# Patient Record
Sex: FEMALE | Race: ASIAN | NOT HISPANIC OR LATINO | URBAN - METROPOLITAN AREA
[De-identification: names, ages, dates, MRNs, and addresses within clinical notes are randomized per-mention and may not be internally consistent; named-entity substitution may affect disease eponyms.]

---

## 2023-01-01 ENCOUNTER — INPATIENT (INPATIENT)
Facility: HOSPITAL | Age: 83
LOS: 14 days | DRG: 423 | End: 2023-05-10
Attending: HOSPITALIST | Admitting: INTERNAL MEDICINE
Payer: MEDICARE

## 2023-01-01 VITALS — HEART RATE: 125 BPM | OXYGEN SATURATION: 82 %

## 2023-01-01 VITALS
DIASTOLIC BLOOD PRESSURE: 61 MMHG | WEIGHT: 98.99 LBS | SYSTOLIC BLOOD PRESSURE: 104 MMHG | HEIGHT: 60 IN | RESPIRATION RATE: 18 BRPM | OXYGEN SATURATION: 94 % | TEMPERATURE: 99 F | HEART RATE: 134 BPM

## 2023-01-01 DIAGNOSIS — K86.89 OTHER SPECIFIED DISEASES OF PANCREAS: ICD-10-CM

## 2023-01-01 DIAGNOSIS — Z71.89 OTHER SPECIFIED COUNSELING: ICD-10-CM

## 2023-01-01 DIAGNOSIS — K22.6 GASTRO-ESOPHAGEAL LACERATION-HEMORRHAGE SYNDROME: ICD-10-CM

## 2023-01-01 DIAGNOSIS — C78.00 SECONDARY MALIGNANT NEOPLASM OF UNSPECIFIED LUNG: ICD-10-CM

## 2023-01-01 DIAGNOSIS — D64.9 ANEMIA, UNSPECIFIED: ICD-10-CM

## 2023-01-01 DIAGNOSIS — K83.09 OTHER CHOLANGITIS: ICD-10-CM

## 2023-01-01 DIAGNOSIS — Z29.9 ENCOUNTER FOR PROPHYLACTIC MEASURES, UNSPECIFIED: ICD-10-CM

## 2023-01-01 DIAGNOSIS — K83.1 OBSTRUCTION OF BILE DUCT: ICD-10-CM

## 2023-01-01 DIAGNOSIS — E87.1 HYPO-OSMOLALITY AND HYPONATREMIA: ICD-10-CM

## 2023-01-01 DIAGNOSIS — R62.7 ADULT FAILURE TO THRIVE: ICD-10-CM

## 2023-01-01 DIAGNOSIS — R74.01 ELEVATION OF LEVELS OF LIVER TRANSAMINASE LEVELS: ICD-10-CM

## 2023-01-01 DIAGNOSIS — R65.10 SYSTEMIC INFLAMMATORY RESPONSE SYNDROME (SIRS) OF NON-INFECTIOUS ORIGIN WITHOUT ACUTE ORGAN DYSFUNCTION: ICD-10-CM

## 2023-01-01 DIAGNOSIS — C25.9 MALIGNANT NEOPLASM OF PANCREAS, UNSPECIFIED: ICD-10-CM

## 2023-01-01 DIAGNOSIS — K26.9 DUODENAL ULCER, UNSPECIFIED AS ACUTE OR CHRONIC, WITHOUT HEMORRHAGE OR PERFORATION: ICD-10-CM

## 2023-01-01 DIAGNOSIS — R11.2 NAUSEA WITH VOMITING, UNSPECIFIED: ICD-10-CM

## 2023-01-01 DIAGNOSIS — J96.01 ACUTE RESPIRATORY FAILURE WITH HYPOXIA: ICD-10-CM

## 2023-01-01 DIAGNOSIS — E22.2 SYNDROME OF INAPPROPRIATE SECRETION OF ANTIDIURETIC HORMONE: ICD-10-CM

## 2023-01-01 DIAGNOSIS — Z86.39 PERSONAL HISTORY OF OTHER ENDOCRINE, NUTRITIONAL AND METABOLIC DISEASE: ICD-10-CM

## 2023-01-01 DIAGNOSIS — R91.1 SOLITARY PULMONARY NODULE: ICD-10-CM

## 2023-01-01 DIAGNOSIS — Z51.5 ENCOUNTER FOR PALLIATIVE CARE: ICD-10-CM

## 2023-01-01 DIAGNOSIS — C18.9 MALIGNANT NEOPLASM OF COLON, UNSPECIFIED: ICD-10-CM

## 2023-01-01 DIAGNOSIS — F03.90 UNSPECIFIED DEMENTIA WITHOUT BEHAVIORAL DISTURBANCE: ICD-10-CM

## 2023-01-01 LAB
ACANTHOCYTES BLD QL SMEAR: SIGNIFICANT CHANGE UP
ALBUMIN SERPL ELPH-MCNC: 1.9 G/DL — LOW (ref 3.3–5)
ALBUMIN SERPL ELPH-MCNC: 2 G/DL — LOW (ref 3.3–5)
ALBUMIN SERPL ELPH-MCNC: 2.1 G/DL — LOW (ref 3.3–5)
ALBUMIN SERPL ELPH-MCNC: 2.1 G/DL — LOW (ref 3.3–5)
ALBUMIN SERPL ELPH-MCNC: 2.2 G/DL — LOW (ref 3.3–5)
ALBUMIN SERPL ELPH-MCNC: 2.3 G/DL — LOW (ref 3.3–5)
ALBUMIN SERPL ELPH-MCNC: 2.4 G/DL — LOW (ref 3.3–5)
ALBUMIN SERPL ELPH-MCNC: 2.4 G/DL — LOW (ref 3.3–5)
ALBUMIN SERPL ELPH-MCNC: 2.5 G/DL — LOW (ref 3.3–5)
ALBUMIN SERPL ELPH-MCNC: 2.5 G/DL — LOW (ref 3.3–5)
ALBUMIN SERPL ELPH-MCNC: 2.6 G/DL — LOW (ref 3.3–5)
ALBUMIN SERPL ELPH-MCNC: 2.6 G/DL — LOW (ref 3.3–5)
ALBUMIN SERPL ELPH-MCNC: 2.7 G/DL — LOW (ref 3.3–5)
ALBUMIN SERPL ELPH-MCNC: 2.8 G/DL — LOW (ref 3.3–5)
ALP SERPL-CCNC: 315 U/L — HIGH (ref 40–120)
ALP SERPL-CCNC: 343 U/L — HIGH (ref 40–120)
ALP SERPL-CCNC: 344 U/L — HIGH (ref 40–120)
ALP SERPL-CCNC: 378 U/L — HIGH (ref 40–120)
ALP SERPL-CCNC: 385 U/L — HIGH (ref 40–120)
ALP SERPL-CCNC: 406 U/L — HIGH (ref 40–120)
ALP SERPL-CCNC: 431 U/L — HIGH (ref 40–120)
ALP SERPL-CCNC: 448 U/L — HIGH (ref 40–120)
ALP SERPL-CCNC: 448 U/L — HIGH (ref 40–120)
ALP SERPL-CCNC: 501 U/L — HIGH (ref 40–120)
ALP SERPL-CCNC: 504 U/L — HIGH (ref 40–120)
ALP SERPL-CCNC: 516 U/L — HIGH (ref 40–120)
ALP SERPL-CCNC: 532 U/L — HIGH (ref 40–120)
ALP SERPL-CCNC: 545 U/L — HIGH (ref 40–120)
ALP SERPL-CCNC: 558 U/L — HIGH (ref 40–120)
ALP SERPL-CCNC: 564 U/L — HIGH (ref 40–120)
ALP SERPL-CCNC: 612 U/L — HIGH (ref 40–120)
ALP SERPL-CCNC: 625 U/L — HIGH (ref 40–120)
ALP SERPL-CCNC: 669 U/L — HIGH (ref 40–120)
ALT FLD-CCNC: 101 U/L — HIGH (ref 10–45)
ALT FLD-CCNC: 102 U/L — HIGH (ref 10–45)
ALT FLD-CCNC: 103 U/L — HIGH (ref 10–45)
ALT FLD-CCNC: 110 U/L — HIGH (ref 10–45)
ALT FLD-CCNC: 112 U/L — HIGH (ref 10–45)
ALT FLD-CCNC: 58 U/L — HIGH (ref 10–45)
ALT FLD-CCNC: 58 U/L — HIGH (ref 10–45)
ALT FLD-CCNC: 63 U/L — HIGH (ref 10–45)
ALT FLD-CCNC: 63 U/L — HIGH (ref 10–45)
ALT FLD-CCNC: 69 U/L — HIGH (ref 10–45)
ALT FLD-CCNC: 72 U/L — HIGH (ref 10–45)
ALT FLD-CCNC: 79 U/L — HIGH (ref 10–45)
ALT FLD-CCNC: 82 U/L — HIGH (ref 10–45)
ALT FLD-CCNC: 87 U/L — HIGH (ref 10–45)
ALT FLD-CCNC: 90 U/L — HIGH (ref 10–45)
ALT FLD-CCNC: 91 U/L — HIGH (ref 10–45)
ALT FLD-CCNC: 95 U/L — HIGH (ref 10–45)
ALT FLD-CCNC: 96 U/L — HIGH (ref 10–45)
ALT FLD-CCNC: 98 U/L — HIGH (ref 10–45)
AMMONIA BLD-MCNC: 16 UMOL/L — SIGNIFICANT CHANGE UP (ref 11–55)
ANION GAP SERPL CALC-SCNC: 10 MMOL/L — SIGNIFICANT CHANGE UP (ref 5–17)
ANION GAP SERPL CALC-SCNC: 11 MMOL/L — SIGNIFICANT CHANGE UP (ref 5–17)
ANION GAP SERPL CALC-SCNC: 6 MMOL/L — SIGNIFICANT CHANGE UP (ref 5–17)
ANION GAP SERPL CALC-SCNC: 7 MMOL/L — SIGNIFICANT CHANGE UP (ref 5–17)
ANION GAP SERPL CALC-SCNC: 8 MMOL/L — SIGNIFICANT CHANGE UP (ref 5–17)
ANION GAP SERPL CALC-SCNC: 9 MMOL/L — SIGNIFICANT CHANGE UP (ref 5–17)
ANISOCYTOSIS BLD QL: SIGNIFICANT CHANGE UP
ANISOCYTOSIS BLD QL: SLIGHT — SIGNIFICANT CHANGE UP
APPEARANCE UR: ABNORMAL
APPEARANCE UR: CLEAR — SIGNIFICANT CHANGE UP
APPEARANCE UR: CLEAR — SIGNIFICANT CHANGE UP
APTT BLD: 26.2 SEC — LOW (ref 27.5–35.5)
APTT BLD: 26.4 SEC — LOW (ref 27.5–35.5)
APTT BLD: 26.5 SEC — LOW (ref 27.5–35.5)
APTT BLD: 27.9 SEC — SIGNIFICANT CHANGE UP (ref 27.5–35.5)
APTT BLD: 30.2 SEC — SIGNIFICANT CHANGE UP (ref 27.5–35.5)
APTT BLD: 33.7 SEC — SIGNIFICANT CHANGE UP (ref 27.5–35.5)
AST SERPL-CCNC: 118 U/L — HIGH (ref 10–40)
AST SERPL-CCNC: 120 U/L — HIGH (ref 10–40)
AST SERPL-CCNC: 123 U/L — HIGH (ref 10–40)
AST SERPL-CCNC: 124 U/L — HIGH (ref 10–40)
AST SERPL-CCNC: 133 U/L — HIGH (ref 10–40)
AST SERPL-CCNC: 135 U/L — HIGH (ref 10–40)
AST SERPL-CCNC: 138 U/L — HIGH (ref 10–40)
AST SERPL-CCNC: 138 U/L — HIGH (ref 10–40)
AST SERPL-CCNC: 140 U/L — HIGH (ref 10–40)
AST SERPL-CCNC: 141 U/L — HIGH (ref 10–40)
AST SERPL-CCNC: 141 U/L — HIGH (ref 10–40)
AST SERPL-CCNC: 142 U/L — HIGH (ref 10–40)
AST SERPL-CCNC: 147 U/L — HIGH (ref 10–40)
AST SERPL-CCNC: 154 U/L — HIGH (ref 10–40)
AST SERPL-CCNC: 156 U/L — HIGH (ref 10–40)
AST SERPL-CCNC: 159 U/L — HIGH (ref 10–40)
AST SERPL-CCNC: 162 U/L — HIGH (ref 10–40)
BACTERIA # UR AUTO: ABNORMAL /HPF
BACTERIA # UR AUTO: PRESENT /HPF
BACTERIA # UR AUTO: PRESENT /HPF
BASE EXCESS BLDA CALC-SCNC: 0.7 MMOL/L — SIGNIFICANT CHANGE UP (ref -2–3)
BASE EXCESS BLDA CALC-SCNC: 0.7 MMOL/L — SIGNIFICANT CHANGE UP (ref -2–3)
BASE EXCESS BLDA CALC-SCNC: 1.7 MMOL/L — SIGNIFICANT CHANGE UP (ref -2–3)
BASE EXCESS BLDV CALC-SCNC: -3.4 MMOL/L — LOW (ref -2–3)
BASO STIPL BLD QL SMEAR: PRESENT — SIGNIFICANT CHANGE UP
BASOPHILS # BLD AUTO: 0 K/UL — SIGNIFICANT CHANGE UP (ref 0–0.2)
BASOPHILS # BLD AUTO: 0.04 K/UL — SIGNIFICANT CHANGE UP (ref 0–0.2)
BASOPHILS # BLD AUTO: 0.05 K/UL — SIGNIFICANT CHANGE UP (ref 0–0.2)
BASOPHILS # BLD AUTO: 0.06 K/UL — SIGNIFICANT CHANGE UP (ref 0–0.2)
BASOPHILS # BLD AUTO: 0.07 K/UL — SIGNIFICANT CHANGE UP (ref 0–0.2)
BASOPHILS # BLD AUTO: 0.08 K/UL — SIGNIFICANT CHANGE UP (ref 0–0.2)
BASOPHILS # BLD AUTO: 0.08 K/UL — SIGNIFICANT CHANGE UP (ref 0–0.2)
BASOPHILS # BLD AUTO: 0.31 K/UL — HIGH (ref 0–0.2)
BASOPHILS NFR BLD AUTO: 0 % — SIGNIFICANT CHANGE UP (ref 0–2)
BASOPHILS NFR BLD AUTO: 0.2 % — SIGNIFICANT CHANGE UP (ref 0–2)
BASOPHILS NFR BLD AUTO: 0.3 % — SIGNIFICANT CHANGE UP (ref 0–2)
BASOPHILS NFR BLD AUTO: 0.9 % — SIGNIFICANT CHANGE UP (ref 0–2)
BILIRUB DIRECT SERPL-MCNC: 8.2 MG/DL — HIGH (ref 0–0.3)
BILIRUB DIRECT SERPL-MCNC: 9.1 MG/DL — HIGH (ref 0–0.3)
BILIRUB DIRECT SERPL-MCNC: 9.7 MG/DL — HIGH (ref 0–0.3)
BILIRUB INDIRECT FLD-MCNC: 2.1 MG/DL — HIGH (ref 0.2–1)
BILIRUB INDIRECT FLD-MCNC: 2.6 MG/DL — HIGH (ref 0.2–1)
BILIRUB SERPL-MCNC: 10.6 MG/DL — HIGH (ref 0.2–1.2)
BILIRUB SERPL-MCNC: 10.8 MG/DL — HIGH (ref 0.2–1.2)
BILIRUB SERPL-MCNC: 10.8 MG/DL — HIGH (ref 0.2–1.2)
BILIRUB SERPL-MCNC: 11.6 MG/DL — HIGH (ref 0.2–1.2)
BILIRUB SERPL-MCNC: 11.7 MG/DL — HIGH (ref 0.2–1.2)
BILIRUB SERPL-MCNC: 11.8 MG/DL — HIGH (ref 0.2–1.2)
BILIRUB SERPL-MCNC: 12.1 MG/DL — HIGH (ref 0.2–1.2)
BILIRUB SERPL-MCNC: 12.4 MG/DL — HIGH (ref 0.2–1.2)
BILIRUB SERPL-MCNC: 12.5 MG/DL — HIGH (ref 0.2–1.2)
BILIRUB SERPL-MCNC: 12.6 MG/DL — HIGH (ref 0.2–1.2)
BILIRUB SERPL-MCNC: 13.8 MG/DL — HIGH (ref 0.2–1.2)
BILIRUB SERPL-MCNC: 14.2 MG/DL — HIGH (ref 0.2–1.2)
BILIRUB SERPL-MCNC: 14.3 MG/DL — HIGH (ref 0.2–1.2)
BILIRUB SERPL-MCNC: 7.9 MG/DL — HIGH (ref 0.2–1.2)
BILIRUB SERPL-MCNC: 8.3 MG/DL — HIGH (ref 0.2–1.2)
BILIRUB SERPL-MCNC: 8.3 MG/DL — HIGH (ref 0.2–1.2)
BILIRUB SERPL-MCNC: 8.7 MG/DL — HIGH (ref 0.2–1.2)
BILIRUB SERPL-MCNC: 8.8 MG/DL — HIGH (ref 0.2–1.2)
BILIRUB SERPL-MCNC: 9.6 MG/DL — HIGH (ref 0.2–1.2)
BILIRUB UR-MCNC: ABNORMAL
BLD GP AB SCN SERPL QL: NEGATIVE — SIGNIFICANT CHANGE UP
BUN SERPL-MCNC: 10 MG/DL — SIGNIFICANT CHANGE UP (ref 7–23)
BUN SERPL-MCNC: 11 MG/DL — SIGNIFICANT CHANGE UP (ref 7–23)
BUN SERPL-MCNC: 12 MG/DL — SIGNIFICANT CHANGE UP (ref 7–23)
BUN SERPL-MCNC: 13 MG/DL — SIGNIFICANT CHANGE UP (ref 7–23)
BUN SERPL-MCNC: 13 MG/DL — SIGNIFICANT CHANGE UP (ref 7–23)
BUN SERPL-MCNC: 14 MG/DL — SIGNIFICANT CHANGE UP (ref 7–23)
BUN SERPL-MCNC: 15 MG/DL — SIGNIFICANT CHANGE UP (ref 7–23)
BUN SERPL-MCNC: 15 MG/DL — SIGNIFICANT CHANGE UP (ref 7–23)
BUN SERPL-MCNC: 20 MG/DL — SIGNIFICANT CHANGE UP (ref 7–23)
BUN SERPL-MCNC: 21 MG/DL — SIGNIFICANT CHANGE UP (ref 7–23)
BUN SERPL-MCNC: 21 MG/DL — SIGNIFICANT CHANGE UP (ref 7–23)
BUN SERPL-MCNC: 22 MG/DL — SIGNIFICANT CHANGE UP (ref 7–23)
BUN SERPL-MCNC: 23 MG/DL — SIGNIFICANT CHANGE UP (ref 7–23)
BUN SERPL-MCNC: 23 MG/DL — SIGNIFICANT CHANGE UP (ref 7–23)
BUN SERPL-MCNC: 24 MG/DL — HIGH (ref 7–23)
BUN SERPL-MCNC: 26 MG/DL — HIGH (ref 7–23)
BUN SERPL-MCNC: 28 MG/DL — HIGH (ref 7–23)
BUN SERPL-MCNC: 8 MG/DL — SIGNIFICANT CHANGE UP (ref 7–23)
C DIFF GDH STL QL: NEGATIVE — SIGNIFICANT CHANGE UP
C DIFF GDH STL QL: SIGNIFICANT CHANGE UP
CA-I SERPL-SCNC: 1.18 MMOL/L — SIGNIFICANT CHANGE UP (ref 1.15–1.33)
CALCIUM SERPL-MCNC: 7.4 MG/DL — LOW (ref 8.4–10.5)
CALCIUM SERPL-MCNC: 7.7 MG/DL — LOW (ref 8.4–10.5)
CALCIUM SERPL-MCNC: 7.7 MG/DL — LOW (ref 8.4–10.5)
CALCIUM SERPL-MCNC: 7.9 MG/DL — LOW (ref 8.4–10.5)
CALCIUM SERPL-MCNC: 8 MG/DL — LOW (ref 8.4–10.5)
CALCIUM SERPL-MCNC: 8 MG/DL — LOW (ref 8.4–10.5)
CALCIUM SERPL-MCNC: 8.1 MG/DL — LOW (ref 8.4–10.5)
CALCIUM SERPL-MCNC: 8.2 MG/DL — LOW (ref 8.4–10.5)
CALCIUM SERPL-MCNC: 8.3 MG/DL — LOW (ref 8.4–10.5)
CALCIUM SERPL-MCNC: 8.4 MG/DL — SIGNIFICANT CHANGE UP (ref 8.4–10.5)
CALCIUM SERPL-MCNC: 8.5 MG/DL — SIGNIFICANT CHANGE UP (ref 8.4–10.5)
CALCIUM SERPL-MCNC: 8.6 MG/DL — SIGNIFICANT CHANGE UP (ref 8.4–10.5)
CALCIUM SERPL-MCNC: 8.7 MG/DL — SIGNIFICANT CHANGE UP (ref 8.4–10.5)
CALCIUM SERPL-MCNC: 8.7 MG/DL — SIGNIFICANT CHANGE UP (ref 8.4–10.5)
CALCIUM SERPL-MCNC: 8.9 MG/DL — SIGNIFICANT CHANGE UP (ref 8.4–10.5)
CANCER AG19-9 SERPL-ACNC: 306 U/ML — HIGH
CEA SERPL-MCNC: 6.3 NG/ML — HIGH (ref 0–3.8)
CHLORIDE SERPL-SCNC: 101 MMOL/L — SIGNIFICANT CHANGE UP (ref 96–108)
CHLORIDE SERPL-SCNC: 101 MMOL/L — SIGNIFICANT CHANGE UP (ref 96–108)
CHLORIDE SERPL-SCNC: 102 MMOL/L — SIGNIFICANT CHANGE UP (ref 96–108)
CHLORIDE SERPL-SCNC: 103 MMOL/L — SIGNIFICANT CHANGE UP (ref 96–108)
CHLORIDE SERPL-SCNC: 104 MMOL/L — SIGNIFICANT CHANGE UP (ref 96–108)
CHLORIDE SERPL-SCNC: 107 MMOL/L — SIGNIFICANT CHANGE UP (ref 96–108)
CHLORIDE SERPL-SCNC: 108 MMOL/L — SIGNIFICANT CHANGE UP (ref 96–108)
CHLORIDE SERPL-SCNC: 111 MMOL/L — HIGH (ref 96–108)
CHLORIDE SERPL-SCNC: 112 MMOL/L — HIGH (ref 96–108)
CHLORIDE SERPL-SCNC: 113 MMOL/L — HIGH (ref 96–108)
CHLORIDE SERPL-SCNC: 114 MMOL/L — HIGH (ref 96–108)
CHLORIDE SERPL-SCNC: 92 MMOL/L — LOW (ref 96–108)
CHLORIDE SERPL-SCNC: 94 MMOL/L — LOW (ref 96–108)
CHLORIDE SERPL-SCNC: 95 MMOL/L — LOW (ref 96–108)
CHLORIDE SERPL-SCNC: 96 MMOL/L — SIGNIFICANT CHANGE UP (ref 96–108)
CHLORIDE SERPL-SCNC: 97 MMOL/L — SIGNIFICANT CHANGE UP (ref 96–108)
CHLORIDE SERPL-SCNC: 98 MMOL/L — SIGNIFICANT CHANGE UP (ref 96–108)
CHLORIDE SERPL-SCNC: 98 MMOL/L — SIGNIFICANT CHANGE UP (ref 96–108)
CHLORIDE SERPL-SCNC: 99 MMOL/L — SIGNIFICANT CHANGE UP (ref 96–108)
CHLORIDE SERPL-SCNC: 99 MMOL/L — SIGNIFICANT CHANGE UP (ref 96–108)
CK MB CFR SERPL CALC: 2.2 NG/ML — SIGNIFICANT CHANGE UP (ref 0–6.7)
CK SERPL-CCNC: 43 U/L — SIGNIFICANT CHANGE UP (ref 25–170)
CO2 BLDA-SCNC: 27 MMOL/L — HIGH (ref 19–24)
CO2 BLDA-SCNC: 27 MMOL/L — HIGH (ref 19–24)
CO2 BLDA-SCNC: 28 MMOL/L — HIGH (ref 19–24)
CO2 BLDV-SCNC: 22.5 MMOL/L — SIGNIFICANT CHANGE UP (ref 22–26)
CO2 SERPL-SCNC: 17 MMOL/L — LOW (ref 22–31)
CO2 SERPL-SCNC: 18 MMOL/L — LOW (ref 22–31)
CO2 SERPL-SCNC: 19 MMOL/L — LOW (ref 22–31)
CO2 SERPL-SCNC: 20 MMOL/L — LOW (ref 22–31)
CO2 SERPL-SCNC: 21 MMOL/L — LOW (ref 22–31)
CO2 SERPL-SCNC: 22 MMOL/L — SIGNIFICANT CHANGE UP (ref 22–31)
CO2 SERPL-SCNC: 22 MMOL/L — SIGNIFICANT CHANGE UP (ref 22–31)
CO2 SERPL-SCNC: 24 MMOL/L — SIGNIFICANT CHANGE UP (ref 22–31)
CO2 SERPL-SCNC: 25 MMOL/L — SIGNIFICANT CHANGE UP (ref 22–31)
COLOR SPEC: ABNORMAL
COLOR SPEC: YELLOW — SIGNIFICANT CHANGE UP
COLOR SPEC: YELLOW — SIGNIFICANT CHANGE UP
COMMENT - URINE: SIGNIFICANT CHANGE UP
CORTIS AM PEAK SERPL-MCNC: 15.34 UG/DL — SIGNIFICANT CHANGE UP (ref 6.02–18.4)
CREAT SERPL-MCNC: 0.48 MG/DL — LOW (ref 0.5–1.3)
CREAT SERPL-MCNC: 0.5 MG/DL — SIGNIFICANT CHANGE UP (ref 0.5–1.3)
CREAT SERPL-MCNC: 0.5 MG/DL — SIGNIFICANT CHANGE UP (ref 0.5–1.3)
CREAT SERPL-MCNC: 0.52 MG/DL — SIGNIFICANT CHANGE UP (ref 0.5–1.3)
CREAT SERPL-MCNC: 0.52 MG/DL — SIGNIFICANT CHANGE UP (ref 0.5–1.3)
CREAT SERPL-MCNC: 0.53 MG/DL — SIGNIFICANT CHANGE UP (ref 0.5–1.3)
CREAT SERPL-MCNC: 0.54 MG/DL — SIGNIFICANT CHANGE UP (ref 0.5–1.3)
CREAT SERPL-MCNC: 0.55 MG/DL — SIGNIFICANT CHANGE UP (ref 0.5–1.3)
CREAT SERPL-MCNC: 0.56 MG/DL — SIGNIFICANT CHANGE UP (ref 0.5–1.3)
CREAT SERPL-MCNC: 0.57 MG/DL — SIGNIFICANT CHANGE UP (ref 0.5–1.3)
CREAT SERPL-MCNC: 0.57 MG/DL — SIGNIFICANT CHANGE UP (ref 0.5–1.3)
CREAT SERPL-MCNC: 0.59 MG/DL — SIGNIFICANT CHANGE UP (ref 0.5–1.3)
CREAT SERPL-MCNC: 0.6 MG/DL — SIGNIFICANT CHANGE UP (ref 0.5–1.3)
CREAT SERPL-MCNC: 0.63 MG/DL — SIGNIFICANT CHANGE UP (ref 0.5–1.3)
CREAT SERPL-MCNC: 0.63 MG/DL — SIGNIFICANT CHANGE UP (ref 0.5–1.3)
CREAT SERPL-MCNC: 0.65 MG/DL — SIGNIFICANT CHANGE UP (ref 0.5–1.3)
CREAT SERPL-MCNC: 0.67 MG/DL — SIGNIFICANT CHANGE UP (ref 0.5–1.3)
CREAT SERPL-MCNC: 0.68 MG/DL — SIGNIFICANT CHANGE UP (ref 0.5–1.3)
CREAT SERPL-MCNC: 0.77 MG/DL — SIGNIFICANT CHANGE UP (ref 0.5–1.3)
CREAT SERPL-MCNC: 0.82 MG/DL — SIGNIFICANT CHANGE UP (ref 0.5–1.3)
CULTURE RESULTS: SIGNIFICANT CHANGE UP
DACRYOCYTES BLD QL SMEAR: SLIGHT — SIGNIFICANT CHANGE UP
DACRYOCYTES BLD QL SMEAR: SLIGHT — SIGNIFICANT CHANGE UP
DIFF PNL FLD: ABNORMAL
DIFF PNL FLD: NEGATIVE — SIGNIFICANT CHANGE UP
DIFF PNL FLD: NEGATIVE — SIGNIFICANT CHANGE UP
EGFR: 71 ML/MIN/1.73M2 — SIGNIFICANT CHANGE UP
EGFR: 77 ML/MIN/1.73M2 — SIGNIFICANT CHANGE UP
EGFR: 87 ML/MIN/1.73M2 — SIGNIFICANT CHANGE UP
EGFR: 87 ML/MIN/1.73M2 — SIGNIFICANT CHANGE UP
EGFR: 88 ML/MIN/1.73M2 — SIGNIFICANT CHANGE UP
EGFR: 89 ML/MIN/1.73M2 — SIGNIFICANT CHANGE UP
EGFR: 89 ML/MIN/1.73M2 — SIGNIFICANT CHANGE UP
EGFR: 90 ML/MIN/1.73M2 — SIGNIFICANT CHANGE UP
EGFR: 91 ML/MIN/1.73M2 — SIGNIFICANT CHANGE UP
EGFR: 92 ML/MIN/1.73M2 — SIGNIFICANT CHANGE UP
EGFR: 92 ML/MIN/1.73M2 — SIGNIFICANT CHANGE UP
EGFR: 93 ML/MIN/1.73M2 — SIGNIFICANT CHANGE UP
EGFR: 93 ML/MIN/1.73M2 — SIGNIFICANT CHANGE UP
EGFR: 94 ML/MIN/1.73M2 — SIGNIFICANT CHANGE UP
EGFR: 94 ML/MIN/1.73M2 — SIGNIFICANT CHANGE UP
EGFR: 95 ML/MIN/1.73M2 — SIGNIFICANT CHANGE UP
EOSINOPHIL # BLD AUTO: 0 K/UL — SIGNIFICANT CHANGE UP (ref 0–0.5)
EOSINOPHIL # BLD AUTO: 0 K/UL — SIGNIFICANT CHANGE UP (ref 0–0.5)
EOSINOPHIL # BLD AUTO: 0.03 K/UL — SIGNIFICANT CHANGE UP (ref 0–0.5)
EOSINOPHIL # BLD AUTO: 0.13 K/UL — SIGNIFICANT CHANGE UP (ref 0–0.5)
EOSINOPHIL # BLD AUTO: 0.14 K/UL — SIGNIFICANT CHANGE UP (ref 0–0.5)
EOSINOPHIL # BLD AUTO: 0.15 K/UL — SIGNIFICANT CHANGE UP (ref 0–0.5)
EOSINOPHIL # BLD AUTO: 0.16 K/UL — SIGNIFICANT CHANGE UP (ref 0–0.5)
EOSINOPHIL # BLD AUTO: 0.17 K/UL — SIGNIFICANT CHANGE UP (ref 0–0.5)
EOSINOPHIL # BLD AUTO: 0.19 K/UL — SIGNIFICANT CHANGE UP (ref 0–0.5)
EOSINOPHIL # BLD AUTO: 0.2 K/UL — SIGNIFICANT CHANGE UP (ref 0–0.5)
EOSINOPHIL # BLD AUTO: 0.24 K/UL — SIGNIFICANT CHANGE UP (ref 0–0.5)
EOSINOPHIL # BLD AUTO: 0.58 K/UL — HIGH (ref 0–0.5)
EOSINOPHIL NFR BLD AUTO: 0 % — SIGNIFICANT CHANGE UP (ref 0–6)
EOSINOPHIL NFR BLD AUTO: 0 % — SIGNIFICANT CHANGE UP (ref 0–6)
EOSINOPHIL NFR BLD AUTO: 0.2 % — SIGNIFICANT CHANGE UP (ref 0–6)
EOSINOPHIL NFR BLD AUTO: 0.4 % — SIGNIFICANT CHANGE UP (ref 0–6)
EOSINOPHIL NFR BLD AUTO: 0.5 % — SIGNIFICANT CHANGE UP (ref 0–6)
EOSINOPHIL NFR BLD AUTO: 0.7 % — SIGNIFICANT CHANGE UP (ref 0–6)
EOSINOPHIL NFR BLD AUTO: 0.7 % — SIGNIFICANT CHANGE UP (ref 0–6)
EOSINOPHIL NFR BLD AUTO: 0.8 % — SIGNIFICANT CHANGE UP (ref 0–6)
EOSINOPHIL NFR BLD AUTO: 0.9 % — SIGNIFICANT CHANGE UP (ref 0–6)
EOSINOPHIL NFR BLD AUTO: 1.7 % — SIGNIFICANT CHANGE UP (ref 0–6)
EPI CELLS # UR: ABNORMAL /HPF (ref 0–5)
EPI CELLS # UR: SIGNIFICANT CHANGE UP /HPF (ref 0–5)
EPI CELLS # UR: SIGNIFICANT CHANGE UP /HPF (ref 0–5)
FIBRINOGEN PPP-MCNC: 461 MG/DL — HIGH (ref 200–445)
GAS PNL BLDV: 122 MMOL/L — LOW (ref 136–145)
GAS PNL BLDV: SIGNIFICANT CHANGE UP
GI PCR PANEL: SIGNIFICANT CHANGE UP
GIANT PLATELETS BLD QL SMEAR: PRESENT — SIGNIFICANT CHANGE UP
GLUCOSE BLDC GLUCOMTR-MCNC: 113 MG/DL — HIGH (ref 70–99)
GLUCOSE BLDC GLUCOMTR-MCNC: 115 MG/DL — HIGH (ref 70–99)
GLUCOSE BLDC GLUCOMTR-MCNC: 117 MG/DL — HIGH (ref 70–99)
GLUCOSE BLDC GLUCOMTR-MCNC: 124 MG/DL — HIGH (ref 70–99)
GLUCOSE BLDC GLUCOMTR-MCNC: 125 MG/DL — HIGH (ref 70–99)
GLUCOSE BLDC GLUCOMTR-MCNC: 128 MG/DL — HIGH (ref 70–99)
GLUCOSE BLDC GLUCOMTR-MCNC: 129 MG/DL — HIGH (ref 70–99)
GLUCOSE BLDC GLUCOMTR-MCNC: 134 MG/DL — HIGH (ref 70–99)
GLUCOSE BLDC GLUCOMTR-MCNC: 134 MG/DL — HIGH (ref 70–99)
GLUCOSE BLDC GLUCOMTR-MCNC: 146 MG/DL — HIGH (ref 70–99)
GLUCOSE BLDC GLUCOMTR-MCNC: 158 MG/DL — HIGH (ref 70–99)
GLUCOSE SERPL-MCNC: 105 MG/DL — HIGH (ref 70–99)
GLUCOSE SERPL-MCNC: 105 MG/DL — HIGH (ref 70–99)
GLUCOSE SERPL-MCNC: 109 MG/DL — HIGH (ref 70–99)
GLUCOSE SERPL-MCNC: 118 MG/DL — HIGH (ref 70–99)
GLUCOSE SERPL-MCNC: 121 MG/DL — HIGH (ref 70–99)
GLUCOSE SERPL-MCNC: 126 MG/DL — HIGH (ref 70–99)
GLUCOSE SERPL-MCNC: 126 MG/DL — HIGH (ref 70–99)
GLUCOSE SERPL-MCNC: 128 MG/DL — HIGH (ref 70–99)
GLUCOSE SERPL-MCNC: 131 MG/DL — HIGH (ref 70–99)
GLUCOSE SERPL-MCNC: 131 MG/DL — HIGH (ref 70–99)
GLUCOSE SERPL-MCNC: 132 MG/DL — HIGH (ref 70–99)
GLUCOSE SERPL-MCNC: 132 MG/DL — HIGH (ref 70–99)
GLUCOSE SERPL-MCNC: 133 MG/DL — HIGH (ref 70–99)
GLUCOSE SERPL-MCNC: 134 MG/DL — HIGH (ref 70–99)
GLUCOSE SERPL-MCNC: 139 MG/DL — HIGH (ref 70–99)
GLUCOSE SERPL-MCNC: 144 MG/DL — HIGH (ref 70–99)
GLUCOSE SERPL-MCNC: 145 MG/DL — HIGH (ref 70–99)
GLUCOSE SERPL-MCNC: 149 MG/DL — HIGH (ref 70–99)
GLUCOSE SERPL-MCNC: 151 MG/DL — HIGH (ref 70–99)
GLUCOSE SERPL-MCNC: 154 MG/DL — HIGH (ref 70–99)
GLUCOSE SERPL-MCNC: 155 MG/DL — HIGH (ref 70–99)
GLUCOSE SERPL-MCNC: 155 MG/DL — HIGH (ref 70–99)
GLUCOSE SERPL-MCNC: 164 MG/DL — HIGH (ref 70–99)
GLUCOSE SERPL-MCNC: 182 MG/DL — HIGH (ref 70–99)
GLUCOSE SERPL-MCNC: 95 MG/DL — SIGNIFICANT CHANGE UP (ref 70–99)
GLUCOSE UR QL: 100
GLUCOSE UR QL: 100
GLUCOSE UR QL: NEGATIVE — SIGNIFICANT CHANGE UP
GRAM STN FLD: SIGNIFICANT CHANGE UP
GRAM STN FLD: SIGNIFICANT CHANGE UP
GRAN CASTS # UR COMP ASSIST: ABNORMAL /LPF
HAPTOGLOB SERPL-MCNC: 112 MG/DL — SIGNIFICANT CHANGE UP (ref 34–200)
HAPTOGLOB SERPL-MCNC: 112 MG/DL — SIGNIFICANT CHANGE UP (ref 34–200)
HAV IGM SER-ACNC: SIGNIFICANT CHANGE UP
HAV IGM SER-ACNC: SIGNIFICANT CHANGE UP
HBV CORE IGM SER-ACNC: SIGNIFICANT CHANGE UP
HBV CORE IGM SER-ACNC: SIGNIFICANT CHANGE UP
HBV SURFACE AG SER-ACNC: SIGNIFICANT CHANGE UP
HBV SURFACE AG SER-ACNC: SIGNIFICANT CHANGE UP
HCO3 BLDA-SCNC: 25 MMOL/L — SIGNIFICANT CHANGE UP (ref 21–28)
HCO3 BLDA-SCNC: 25 MMOL/L — SIGNIFICANT CHANGE UP (ref 21–28)
HCO3 BLDA-SCNC: 27 MMOL/L — SIGNIFICANT CHANGE UP (ref 21–28)
HCO3 BLDV-SCNC: 21 MMOL/L — LOW (ref 22–29)
HCT VFR BLD CALC: 17.6 % — CRITICAL LOW (ref 34.5–45)
HCT VFR BLD CALC: 17.9 % — CRITICAL LOW (ref 34.5–45)
HCT VFR BLD CALC: 22.4 % — LOW (ref 34.5–45)
HCT VFR BLD CALC: 22.9 % — LOW (ref 34.5–45)
HCT VFR BLD CALC: 23.5 % — LOW (ref 34.5–45)
HCT VFR BLD CALC: 23.7 % — LOW (ref 34.5–45)
HCT VFR BLD CALC: 23.9 % — LOW (ref 34.5–45)
HCT VFR BLD CALC: 23.9 % — LOW (ref 34.5–45)
HCT VFR BLD CALC: 25.8 % — LOW (ref 34.5–45)
HCT VFR BLD CALC: 26.2 % — LOW (ref 34.5–45)
HCT VFR BLD CALC: 26.6 % — LOW (ref 34.5–45)
HCT VFR BLD CALC: 27.1 % — LOW (ref 34.5–45)
HCT VFR BLD CALC: 27.4 % — LOW (ref 34.5–45)
HCT VFR BLD CALC: 27.6 % — LOW (ref 34.5–45)
HCT VFR BLD CALC: 27.6 % — LOW (ref 34.5–45)
HCT VFR BLD CALC: 27.8 % — LOW (ref 34.5–45)
HCT VFR BLD CALC: 28.1 % — LOW (ref 34.5–45)
HCT VFR BLD CALC: 29.1 % — LOW (ref 34.5–45)
HCT VFR BLD CALC: 30.5 % — LOW (ref 34.5–45)
HCT VFR BLD CALC: 31.3 % — LOW (ref 34.5–45)
HCT VFR BLD CALC: 31.7 % — LOW (ref 34.5–45)
HCT VFR BLD CALC: 32.7 % — LOW (ref 34.5–45)
HCT VFR BLD CALC: 33.3 % — LOW (ref 34.5–45)
HCV AB S/CO SERPL IA: 0.14 S/CO — SIGNIFICANT CHANGE UP (ref 0–0.99)
HCV AB S/CO SERPL IA: 0.16 S/CO — SIGNIFICANT CHANGE UP (ref 0–0.99)
HCV AB SERPL-IMP: SIGNIFICANT CHANGE UP
HCV AB SERPL-IMP: SIGNIFICANT CHANGE UP
HGB BLD-MCNC: 10.3 G/DL — LOW (ref 11.5–15.5)
HGB BLD-MCNC: 10.3 G/DL — LOW (ref 11.5–15.5)
HGB BLD-MCNC: 10.9 G/DL — LOW (ref 11.5–15.5)
HGB BLD-MCNC: 5.6 G/DL — CRITICAL LOW (ref 11.5–15.5)
HGB BLD-MCNC: 5.7 G/DL — CRITICAL LOW (ref 11.5–15.5)
HGB BLD-MCNC: 6.8 G/DL — CRITICAL LOW (ref 11.5–15.5)
HGB BLD-MCNC: 7.1 G/DL — LOW (ref 11.5–15.5)
HGB BLD-MCNC: 7.3 G/DL — LOW (ref 11.5–15.5)
HGB BLD-MCNC: 7.4 G/DL — LOW (ref 11.5–15.5)
HGB BLD-MCNC: 7.6 G/DL — LOW (ref 11.5–15.5)
HGB BLD-MCNC: 7.7 G/DL — LOW (ref 11.5–15.5)
HGB BLD-MCNC: 8 G/DL — LOW (ref 11.5–15.5)
HGB BLD-MCNC: 8 G/DL — LOW (ref 11.5–15.5)
HGB BLD-MCNC: 8.3 G/DL — LOW (ref 11.5–15.5)
HGB BLD-MCNC: 8.6 G/DL — LOW (ref 11.5–15.5)
HGB BLD-MCNC: 8.7 G/DL — LOW (ref 11.5–15.5)
HGB BLD-MCNC: 8.8 G/DL — LOW (ref 11.5–15.5)
HGB BLD-MCNC: 9 G/DL — LOW (ref 11.5–15.5)
HGB BLD-MCNC: 9.1 G/DL — LOW (ref 11.5–15.5)
HGB BLD-MCNC: 9.1 G/DL — LOW (ref 11.5–15.5)
HGB BLD-MCNC: 9.6 G/DL — LOW (ref 11.5–15.5)
HGB BLD-MCNC: 9.7 G/DL — LOW (ref 11.5–15.5)
HGB BLD-MCNC: 9.8 G/DL — LOW (ref 11.5–15.5)
HOROWITZ INDEX BLDA+IHG-RTO: 35 — SIGNIFICANT CHANGE UP
HYPOCHROMIA BLD QL: SIGNIFICANT CHANGE UP
HYPOCHROMIA BLD QL: SIGNIFICANT CHANGE UP
HYPOCHROMIA BLD QL: SLIGHT — SIGNIFICANT CHANGE UP
IMM GRANULOCYTES NFR BLD AUTO: 1.1 % — HIGH (ref 0–0.9)
IMM GRANULOCYTES NFR BLD AUTO: 1.4 % — HIGH (ref 0–0.9)
IMM GRANULOCYTES NFR BLD AUTO: 1.7 % — HIGH (ref 0–0.9)
IMM GRANULOCYTES NFR BLD AUTO: 2.4 % — HIGH (ref 0–0.9)
IMM GRANULOCYTES NFR BLD AUTO: 2.6 % — HIGH (ref 0–0.9)
IMM GRANULOCYTES NFR BLD AUTO: 3.4 % — HIGH (ref 0–0.9)
INR BLD: 1.15 — SIGNIFICANT CHANGE UP (ref 0.88–1.16)
INR BLD: 1.17 — HIGH (ref 0.88–1.16)
INR BLD: 1.2 — HIGH (ref 0.88–1.16)
INR BLD: 1.94 — HIGH (ref 0.88–1.16)
INR BLD: 1.98 — HIGH (ref 0.88–1.16)
INR BLD: 2.02 — HIGH (ref 0.88–1.16)
KETONES UR-MCNC: 15 MG/DL
KETONES UR-MCNC: 40 MG/DL
KETONES UR-MCNC: NEGATIVE — SIGNIFICANT CHANGE UP
LACTATE SERPL-SCNC: 1 MMOL/L — SIGNIFICANT CHANGE UP (ref 0.5–2)
LACTATE SERPL-SCNC: 1.4 MMOL/L — SIGNIFICANT CHANGE UP (ref 0.5–2)
LDH SERPL L TO P-CCNC: 319 U/L — HIGH (ref 50–242)
LDH SERPL L TO P-CCNC: 359 U/L — HIGH (ref 50–242)
LEUKOCYTE ESTERASE UR-ACNC: ABNORMAL
LEUKOCYTE ESTERASE UR-ACNC: NEGATIVE — SIGNIFICANT CHANGE UP
LEUKOCYTE ESTERASE UR-ACNC: NEGATIVE — SIGNIFICANT CHANGE UP
LIDOCAIN IGE QN: 12 U/L — SIGNIFICANT CHANGE UP (ref 7–60)
LYMPHOCYTES # BLD AUTO: 0.23 K/UL — LOW (ref 1–3.3)
LYMPHOCYTES # BLD AUTO: 0.45 K/UL — LOW (ref 1–3.3)
LYMPHOCYTES # BLD AUTO: 0.51 K/UL — LOW (ref 1–3.3)
LYMPHOCYTES # BLD AUTO: 0.78 K/UL — LOW (ref 1–3.3)
LYMPHOCYTES # BLD AUTO: 0.9 % — LOW (ref 13–44)
LYMPHOCYTES # BLD AUTO: 0.97 K/UL — LOW (ref 1–3.3)
LYMPHOCYTES # BLD AUTO: 0.98 K/UL — LOW (ref 1–3.3)
LYMPHOCYTES # BLD AUTO: 1.16 K/UL — SIGNIFICANT CHANGE UP (ref 1–3.3)
LYMPHOCYTES # BLD AUTO: 1.2 K/UL — SIGNIFICANT CHANGE UP (ref 1–3.3)
LYMPHOCYTES # BLD AUTO: 1.46 K/UL — SIGNIFICANT CHANGE UP (ref 1–3.3)
LYMPHOCYTES # BLD AUTO: 1.59 K/UL — SIGNIFICANT CHANGE UP (ref 1–3.3)
LYMPHOCYTES # BLD AUTO: 1.7 % — LOW (ref 13–44)
LYMPHOCYTES # BLD AUTO: 1.7 % — LOW (ref 13–44)
LYMPHOCYTES # BLD AUTO: 2.15 K/UL — SIGNIFICANT CHANGE UP (ref 1–3.3)
LYMPHOCYTES # BLD AUTO: 2.59 K/UL — SIGNIFICANT CHANGE UP (ref 1–3.3)
LYMPHOCYTES # BLD AUTO: 3.5 % — LOW (ref 13–44)
LYMPHOCYTES # BLD AUTO: 4.4 % — LOW (ref 13–44)
LYMPHOCYTES # BLD AUTO: 5.2 % — LOW (ref 13–44)
LYMPHOCYTES # BLD AUTO: 5.6 % — LOW (ref 13–44)
LYMPHOCYTES # BLD AUTO: 6.9 % — LOW (ref 13–44)
LYMPHOCYTES # BLD AUTO: 7.4 % — LOW (ref 13–44)
LYMPHOCYTES # BLD AUTO: 8 % — LOW (ref 13–44)
MACROCYTES BLD QL: SIGNIFICANT CHANGE UP
MACROCYTES BLD QL: SLIGHT — SIGNIFICANT CHANGE UP
MAGNESIUM SERPL-MCNC: 1.7 MG/DL — SIGNIFICANT CHANGE UP (ref 1.6–2.6)
MAGNESIUM SERPL-MCNC: 1.7 MG/DL — SIGNIFICANT CHANGE UP (ref 1.6–2.6)
MAGNESIUM SERPL-MCNC: 1.8 MG/DL — SIGNIFICANT CHANGE UP (ref 1.6–2.6)
MAGNESIUM SERPL-MCNC: 1.8 MG/DL — SIGNIFICANT CHANGE UP (ref 1.6–2.6)
MAGNESIUM SERPL-MCNC: 1.9 MG/DL — SIGNIFICANT CHANGE UP (ref 1.6–2.6)
MAGNESIUM SERPL-MCNC: 2 MG/DL — SIGNIFICANT CHANGE UP (ref 1.6–2.6)
MAGNESIUM SERPL-MCNC: 2.1 MG/DL — SIGNIFICANT CHANGE UP (ref 1.6–2.6)
MAGNESIUM SERPL-MCNC: 2.2 MG/DL — SIGNIFICANT CHANGE UP (ref 1.6–2.6)
MAGNESIUM SERPL-MCNC: 2.2 MG/DL — SIGNIFICANT CHANGE UP (ref 1.6–2.6)
MANUAL SMEAR VERIFICATION: SIGNIFICANT CHANGE UP
MCHC RBC-ENTMCNC: 29.6 PG — SIGNIFICANT CHANGE UP (ref 27–34)
MCHC RBC-ENTMCNC: 29.8 PG — SIGNIFICANT CHANGE UP (ref 27–34)
MCHC RBC-ENTMCNC: 30 PG — SIGNIFICANT CHANGE UP (ref 27–34)
MCHC RBC-ENTMCNC: 30.1 GM/DL — LOW (ref 32–36)
MCHC RBC-ENTMCNC: 30.1 PG — SIGNIFICANT CHANGE UP (ref 27–34)
MCHC RBC-ENTMCNC: 30.4 GM/DL — LOW (ref 32–36)
MCHC RBC-ENTMCNC: 30.5 GM/DL — LOW (ref 32–36)
MCHC RBC-ENTMCNC: 30.6 PG — SIGNIFICANT CHANGE UP (ref 27–34)
MCHC RBC-ENTMCNC: 30.7 PG — SIGNIFICANT CHANGE UP (ref 27–34)
MCHC RBC-ENTMCNC: 30.8 PG — SIGNIFICANT CHANGE UP (ref 27–34)
MCHC RBC-ENTMCNC: 30.9 PG — SIGNIFICANT CHANGE UP (ref 27–34)
MCHC RBC-ENTMCNC: 31 GM/DL — LOW (ref 32–36)
MCHC RBC-ENTMCNC: 31 GM/DL — LOW (ref 32–36)
MCHC RBC-ENTMCNC: 31 PG — SIGNIFICANT CHANGE UP (ref 27–34)
MCHC RBC-ENTMCNC: 31.1 GM/DL — LOW (ref 32–36)
MCHC RBC-ENTMCNC: 31.1 PG — SIGNIFICANT CHANGE UP (ref 27–34)
MCHC RBC-ENTMCNC: 31.2 GM/DL — LOW (ref 32–36)
MCHC RBC-ENTMCNC: 31.2 PG — SIGNIFICANT CHANGE UP (ref 27–34)
MCHC RBC-ENTMCNC: 31.3 GM/DL — LOW (ref 32–36)
MCHC RBC-ENTMCNC: 31.3 PG — SIGNIFICANT CHANGE UP (ref 27–34)
MCHC RBC-ENTMCNC: 31.3 PG — SIGNIFICANT CHANGE UP (ref 27–34)
MCHC RBC-ENTMCNC: 31.4 GM/DL — LOW (ref 32–36)
MCHC RBC-ENTMCNC: 31.5 GM/DL — LOW (ref 32–36)
MCHC RBC-ENTMCNC: 31.5 GM/DL — LOW (ref 32–36)
MCHC RBC-ENTMCNC: 31.6 PG — SIGNIFICANT CHANGE UP (ref 27–34)
MCHC RBC-ENTMCNC: 31.8 GM/DL — LOW (ref 32–36)
MCHC RBC-ENTMCNC: 32.1 GM/DL — SIGNIFICANT CHANGE UP (ref 32–36)
MCHC RBC-ENTMCNC: 32.2 GM/DL — SIGNIFICANT CHANGE UP (ref 32–36)
MCHC RBC-ENTMCNC: 32.4 GM/DL — SIGNIFICANT CHANGE UP (ref 32–36)
MCHC RBC-ENTMCNC: 32.4 GM/DL — SIGNIFICANT CHANGE UP (ref 32–36)
MCHC RBC-ENTMCNC: 32.5 GM/DL — SIGNIFICANT CHANGE UP (ref 32–36)
MCHC RBC-ENTMCNC: 32.7 GM/DL — SIGNIFICANT CHANGE UP (ref 32–36)
MCHC RBC-ENTMCNC: 33 GM/DL — SIGNIFICANT CHANGE UP (ref 32–36)
MCHC RBC-ENTMCNC: 33.1 GM/DL — SIGNIFICANT CHANGE UP (ref 32–36)
MCHC RBC-ENTMCNC: 33.3 GM/DL — SIGNIFICANT CHANGE UP (ref 32–36)
MCV RBC AUTO: 100.4 FL — HIGH (ref 80–100)
MCV RBC AUTO: 100.9 FL — HIGH (ref 80–100)
MCV RBC AUTO: 101.4 FL — HIGH (ref 80–100)
MCV RBC AUTO: 103 FL — HIGH (ref 80–100)
MCV RBC AUTO: 93 FL — SIGNIFICANT CHANGE UP (ref 80–100)
MCV RBC AUTO: 93.3 FL — SIGNIFICANT CHANGE UP (ref 80–100)
MCV RBC AUTO: 93.7 FL — SIGNIFICANT CHANGE UP (ref 80–100)
MCV RBC AUTO: 93.9 FL — SIGNIFICANT CHANGE UP (ref 80–100)
MCV RBC AUTO: 94.1 FL — SIGNIFICANT CHANGE UP (ref 80–100)
MCV RBC AUTO: 94.6 FL — SIGNIFICANT CHANGE UP (ref 80–100)
MCV RBC AUTO: 95.1 FL — SIGNIFICANT CHANGE UP (ref 80–100)
MCV RBC AUTO: 95.4 FL — SIGNIFICANT CHANGE UP (ref 80–100)
MCV RBC AUTO: 95.6 FL — SIGNIFICANT CHANGE UP (ref 80–100)
MCV RBC AUTO: 95.7 FL — SIGNIFICANT CHANGE UP (ref 80–100)
MCV RBC AUTO: 96 FL — SIGNIFICANT CHANGE UP (ref 80–100)
MCV RBC AUTO: 96.1 FL — SIGNIFICANT CHANGE UP (ref 80–100)
MCV RBC AUTO: 96.3 FL — SIGNIFICANT CHANGE UP (ref 80–100)
MCV RBC AUTO: 97 FL — SIGNIFICANT CHANGE UP (ref 80–100)
MCV RBC AUTO: 97 FL — SIGNIFICANT CHANGE UP (ref 80–100)
MCV RBC AUTO: 97.5 FL — SIGNIFICANT CHANGE UP (ref 80–100)
MCV RBC AUTO: 98.2 FL — SIGNIFICANT CHANGE UP (ref 80–100)
MCV RBC AUTO: 98.4 FL — SIGNIFICANT CHANGE UP (ref 80–100)
MCV RBC AUTO: 98.8 FL — SIGNIFICANT CHANGE UP (ref 80–100)
METAMYELOCYTES # FLD: 0.9 % — HIGH (ref 0–0)
METAMYELOCYTES # FLD: 0.9 % — HIGH (ref 0–0)
MICROCYTES BLD QL: SLIGHT — SIGNIFICANT CHANGE UP
MONOCYTES # BLD AUTO: 0.2 K/UL — SIGNIFICANT CHANGE UP (ref 0–0.9)
MONOCYTES # BLD AUTO: 0.44 K/UL — SIGNIFICANT CHANGE UP (ref 0–0.9)
MONOCYTES # BLD AUTO: 0.45 K/UL — SIGNIFICANT CHANGE UP (ref 0–0.9)
MONOCYTES # BLD AUTO: 0.51 K/UL — SIGNIFICANT CHANGE UP (ref 0–0.9)
MONOCYTES # BLD AUTO: 0.64 K/UL — SIGNIFICANT CHANGE UP (ref 0–0.9)
MONOCYTES # BLD AUTO: 0.79 K/UL — SIGNIFICANT CHANGE UP (ref 0–0.9)
MONOCYTES # BLD AUTO: 0.92 K/UL — HIGH (ref 0–0.9)
MONOCYTES # BLD AUTO: 1.05 K/UL — HIGH (ref 0–0.9)
MONOCYTES # BLD AUTO: 1.08 K/UL — HIGH (ref 0–0.9)
MONOCYTES # BLD AUTO: 1.46 K/UL — HIGH (ref 0–0.9)
MONOCYTES # BLD AUTO: 1.52 K/UL — HIGH (ref 0–0.9)
MONOCYTES # BLD AUTO: 2.67 K/UL — HIGH (ref 0–0.9)
MONOCYTES NFR BLD AUTO: 0.9 % — LOW (ref 2–14)
MONOCYTES NFR BLD AUTO: 1.7 % — LOW (ref 2–14)
MONOCYTES NFR BLD AUTO: 3.2 % — SIGNIFICANT CHANGE UP (ref 2–14)
MONOCYTES NFR BLD AUTO: 3.5 % — SIGNIFICANT CHANGE UP (ref 2–14)
MONOCYTES NFR BLD AUTO: 4.3 % — SIGNIFICANT CHANGE UP (ref 2–14)
MONOCYTES NFR BLD AUTO: 4.5 % — SIGNIFICANT CHANGE UP (ref 2–14)
MONOCYTES NFR BLD AUTO: 4.7 % — SIGNIFICANT CHANGE UP (ref 2–14)
MONOCYTES NFR BLD AUTO: 5.6 % — SIGNIFICANT CHANGE UP (ref 2–14)
MONOCYTES NFR BLD AUTO: 5.8 % — SIGNIFICANT CHANGE UP (ref 2–14)
MONOCYTES NFR BLD AUTO: 7.8 % — SIGNIFICANT CHANGE UP (ref 2–14)
MRSA PCR RESULT.: NEGATIVE — SIGNIFICANT CHANGE UP
NEUTROPHILS # BLD AUTO: 15.83 K/UL — HIGH (ref 1.8–7.4)
NEUTROPHILS # BLD AUTO: 15.86 K/UL — HIGH (ref 1.8–7.4)
NEUTROPHILS # BLD AUTO: 15.94 K/UL — HIGH (ref 1.8–7.4)
NEUTROPHILS # BLD AUTO: 16.12 K/UL — HIGH (ref 1.8–7.4)
NEUTROPHILS # BLD AUTO: 20.79 K/UL — HIGH (ref 1.8–7.4)
NEUTROPHILS # BLD AUTO: 25.02 K/UL — HIGH (ref 1.8–7.4)
NEUTROPHILS # BLD AUTO: 25.21 K/UL — HIGH (ref 1.8–7.4)
NEUTROPHILS # BLD AUTO: 25.44 K/UL — HIGH (ref 1.8–7.4)
NEUTROPHILS # BLD AUTO: 26.61 K/UL — HIGH (ref 1.8–7.4)
NEUTROPHILS # BLD AUTO: 28.58 K/UL — HIGH (ref 1.8–7.4)
NEUTROPHILS # BLD AUTO: 29.51 K/UL — HIGH (ref 1.8–7.4)
NEUTROPHILS # BLD AUTO: 29.58 K/UL — HIGH (ref 1.8–7.4)
NEUTROPHILS NFR BLD AUTO: 84.3 % — HIGH (ref 43–77)
NEUTROPHILS NFR BLD AUTO: 85.2 % — HIGH (ref 43–77)
NEUTROPHILS NFR BLD AUTO: 85.3 % — HIGH (ref 43–77)
NEUTROPHILS NFR BLD AUTO: 85.8 % — HIGH (ref 43–77)
NEUTROPHILS NFR BLD AUTO: 86 % — HIGH (ref 43–77)
NEUTROPHILS NFR BLD AUTO: 86.7 % — HIGH (ref 43–77)
NEUTROPHILS NFR BLD AUTO: 89.1 % — HIGH (ref 43–77)
NEUTROPHILS NFR BLD AUTO: 89.3 % — HIGH (ref 43–77)
NEUTROPHILS NFR BLD AUTO: 89.6 % — HIGH (ref 43–77)
NEUTROPHILS NFR BLD AUTO: 92.1 % — HIGH (ref 43–77)
NEUTROPHILS NFR BLD AUTO: 95.7 % — HIGH (ref 43–77)
NEUTROPHILS NFR BLD AUTO: 96.5 % — HIGH (ref 43–77)
NEUTS BAND # BLD: 0.9 % — SIGNIFICANT CHANGE UP (ref 0–8)
NEUTS BAND # BLD: 0.9 % — SIGNIFICANT CHANGE UP (ref 0–8)
NEUTS BAND # BLD: 6.1 % — SIGNIFICANT CHANGE UP (ref 0–8)
NITRITE UR-MCNC: NEGATIVE — SIGNIFICANT CHANGE UP
NRBC # BLD: 0 /100 WBCS — SIGNIFICANT CHANGE UP (ref 0–0)
NRBC # BLD: 1 /100 — HIGH (ref 0–0)
NRBC # BLD: SIGNIFICANT CHANGE UP /100 WBCS (ref 0–0)
OSMOLALITY SERPL: 261 MOSM/KG — LOW (ref 280–301)
OSMOLALITY SERPL: 264 MOSM/KG — LOW (ref 280–301)
OSMOLALITY SERPL: 269 MOSM/KG — LOW (ref 280–301)
OSMOLALITY UR: 1702 MOSM/KG — HIGH (ref 300–900)
OSMOLALITY UR: 414 MOSM/KG — SIGNIFICANT CHANGE UP (ref 300–900)
OVALOCYTES BLD QL SMEAR: SLIGHT — SIGNIFICANT CHANGE UP
PCO2 BLDA: 40 MMHG — SIGNIFICANT CHANGE UP (ref 32–45)
PCO2 BLDA: 40 MMHG — SIGNIFICANT CHANGE UP (ref 32–45)
PCO2 BLDA: 42 MMHG — SIGNIFICANT CHANGE UP (ref 32–45)
PCO2 BLDV: 37 MMHG — LOW (ref 39–42)
PH BLDA: 7.41 — SIGNIFICANT CHANGE UP (ref 7.35–7.45)
PH BLDV: 7.37 — SIGNIFICANT CHANGE UP (ref 7.32–7.43)
PH UR: 6.5 — SIGNIFICANT CHANGE UP (ref 5–8)
PHOSPHATE SERPL-MCNC: 1.2 MG/DL — LOW (ref 2.5–4.5)
PHOSPHATE SERPL-MCNC: 2 MG/DL — LOW (ref 2.5–4.5)
PHOSPHATE SERPL-MCNC: 2 MG/DL — LOW (ref 2.5–4.5)
PHOSPHATE SERPL-MCNC: 2.1 MG/DL — LOW (ref 2.5–4.5)
PHOSPHATE SERPL-MCNC: 2.2 MG/DL — LOW (ref 2.5–4.5)
PHOSPHATE SERPL-MCNC: 2.2 MG/DL — LOW (ref 2.5–4.5)
PHOSPHATE SERPL-MCNC: 2.3 MG/DL — LOW (ref 2.5–4.5)
PHOSPHATE SERPL-MCNC: 2.5 MG/DL — SIGNIFICANT CHANGE UP (ref 2.5–4.5)
PHOSPHATE SERPL-MCNC: 2.6 MG/DL — SIGNIFICANT CHANGE UP (ref 2.5–4.5)
PHOSPHATE SERPL-MCNC: 2.7 MG/DL — SIGNIFICANT CHANGE UP (ref 2.5–4.5)
PHOSPHATE SERPL-MCNC: 2.9 MG/DL — SIGNIFICANT CHANGE UP (ref 2.5–4.5)
PHOSPHATE SERPL-MCNC: 3 MG/DL — SIGNIFICANT CHANGE UP (ref 2.5–4.5)
PHOSPHATE SERPL-MCNC: 3.1 MG/DL — SIGNIFICANT CHANGE UP (ref 2.5–4.5)
PLAT MORPH BLD: ABNORMAL
PLAT MORPH BLD: NORMAL — SIGNIFICANT CHANGE UP
PLATELET # BLD AUTO: 138 K/UL — LOW (ref 150–400)
PLATELET # BLD AUTO: 142 K/UL — LOW (ref 150–400)
PLATELET # BLD AUTO: 161 K/UL — SIGNIFICANT CHANGE UP (ref 150–400)
PLATELET # BLD AUTO: 191 K/UL — SIGNIFICANT CHANGE UP (ref 150–400)
PLATELET # BLD AUTO: 191 K/UL — SIGNIFICANT CHANGE UP (ref 150–400)
PLATELET # BLD AUTO: 194 K/UL — SIGNIFICANT CHANGE UP (ref 150–400)
PLATELET # BLD AUTO: 208 K/UL — SIGNIFICANT CHANGE UP (ref 150–400)
PLATELET # BLD AUTO: 209 K/UL — SIGNIFICANT CHANGE UP (ref 150–400)
PLATELET # BLD AUTO: 211 K/UL — SIGNIFICANT CHANGE UP (ref 150–400)
PLATELET # BLD AUTO: 213 K/UL — SIGNIFICANT CHANGE UP (ref 150–400)
PLATELET # BLD AUTO: 232 K/UL — SIGNIFICANT CHANGE UP (ref 150–400)
PLATELET # BLD AUTO: 237 K/UL — SIGNIFICANT CHANGE UP (ref 150–400)
PLATELET # BLD AUTO: 238 K/UL — SIGNIFICANT CHANGE UP (ref 150–400)
PLATELET # BLD AUTO: 246 K/UL — SIGNIFICANT CHANGE UP (ref 150–400)
PLATELET # BLD AUTO: 248 K/UL — SIGNIFICANT CHANGE UP (ref 150–400)
PLATELET # BLD AUTO: 249 K/UL — SIGNIFICANT CHANGE UP (ref 150–400)
PLATELET # BLD AUTO: 261 K/UL — SIGNIFICANT CHANGE UP (ref 150–400)
PLATELET # BLD AUTO: 264 K/UL — SIGNIFICANT CHANGE UP (ref 150–400)
PLATELET # BLD AUTO: 268 K/UL — SIGNIFICANT CHANGE UP (ref 150–400)
PLATELET # BLD AUTO: 269 K/UL — SIGNIFICANT CHANGE UP (ref 150–400)
PLATELET # BLD AUTO: 279 K/UL — SIGNIFICANT CHANGE UP (ref 150–400)
PLATELET # BLD AUTO: 307 K/UL — SIGNIFICANT CHANGE UP (ref 150–400)
PLATELET # BLD AUTO: 310 K/UL — SIGNIFICANT CHANGE UP (ref 150–400)
PO2 BLDA: 113 MMHG — HIGH (ref 83–108)
PO2 BLDA: 218 MMHG — HIGH (ref 83–108)
PO2 BLDA: 97 MMHG — SIGNIFICANT CHANGE UP (ref 83–108)
PO2 BLDV: <33 MMHG — LOW (ref 25–45)
POIKILOCYTOSIS BLD QL AUTO: SIGNIFICANT CHANGE UP
POIKILOCYTOSIS BLD QL AUTO: SLIGHT — SIGNIFICANT CHANGE UP
POLYCHROMASIA BLD QL SMEAR: SIGNIFICANT CHANGE UP
POLYCHROMASIA BLD QL SMEAR: SIGNIFICANT CHANGE UP
POLYCHROMASIA BLD QL SMEAR: SLIGHT — SIGNIFICANT CHANGE UP
POTASSIUM BLDV-SCNC: 3.8 MMOL/L — SIGNIFICANT CHANGE UP (ref 3.5–5.1)
POTASSIUM SERPL-MCNC: 3.3 MMOL/L — LOW (ref 3.5–5.3)
POTASSIUM SERPL-MCNC: 3.4 MMOL/L — LOW (ref 3.5–5.3)
POTASSIUM SERPL-MCNC: 3.5 MMOL/L — SIGNIFICANT CHANGE UP (ref 3.5–5.3)
POTASSIUM SERPL-MCNC: 3.6 MMOL/L — SIGNIFICANT CHANGE UP (ref 3.5–5.3)
POTASSIUM SERPL-MCNC: 3.7 MMOL/L — SIGNIFICANT CHANGE UP (ref 3.5–5.3)
POTASSIUM SERPL-MCNC: 3.8 MMOL/L — SIGNIFICANT CHANGE UP (ref 3.5–5.3)
POTASSIUM SERPL-MCNC: 3.9 MMOL/L — SIGNIFICANT CHANGE UP (ref 3.5–5.3)
POTASSIUM SERPL-MCNC: 4 MMOL/L — SIGNIFICANT CHANGE UP (ref 3.5–5.3)
POTASSIUM SERPL-MCNC: 4.1 MMOL/L — SIGNIFICANT CHANGE UP (ref 3.5–5.3)
POTASSIUM SERPL-MCNC: 4.2 MMOL/L — SIGNIFICANT CHANGE UP (ref 3.5–5.3)
POTASSIUM SERPL-MCNC: 4.3 MMOL/L — SIGNIFICANT CHANGE UP (ref 3.5–5.3)
POTASSIUM SERPL-MCNC: 4.3 MMOL/L — SIGNIFICANT CHANGE UP (ref 3.5–5.3)
POTASSIUM SERPL-SCNC: 3.3 MMOL/L — LOW (ref 3.5–5.3)
POTASSIUM SERPL-SCNC: 3.4 MMOL/L — LOW (ref 3.5–5.3)
POTASSIUM SERPL-SCNC: 3.5 MMOL/L — SIGNIFICANT CHANGE UP (ref 3.5–5.3)
POTASSIUM SERPL-SCNC: 3.6 MMOL/L — SIGNIFICANT CHANGE UP (ref 3.5–5.3)
POTASSIUM SERPL-SCNC: 3.7 MMOL/L — SIGNIFICANT CHANGE UP (ref 3.5–5.3)
POTASSIUM SERPL-SCNC: 3.8 MMOL/L — SIGNIFICANT CHANGE UP (ref 3.5–5.3)
POTASSIUM SERPL-SCNC: 3.9 MMOL/L — SIGNIFICANT CHANGE UP (ref 3.5–5.3)
POTASSIUM SERPL-SCNC: 4 MMOL/L — SIGNIFICANT CHANGE UP (ref 3.5–5.3)
POTASSIUM SERPL-SCNC: 4.1 MMOL/L — SIGNIFICANT CHANGE UP (ref 3.5–5.3)
POTASSIUM SERPL-SCNC: 4.2 MMOL/L — SIGNIFICANT CHANGE UP (ref 3.5–5.3)
POTASSIUM SERPL-SCNC: 4.3 MMOL/L — SIGNIFICANT CHANGE UP (ref 3.5–5.3)
POTASSIUM SERPL-SCNC: 4.3 MMOL/L — SIGNIFICANT CHANGE UP (ref 3.5–5.3)
PROCALCITONIN SERPL-MCNC: 0.37 NG/ML — HIGH (ref 0.02–0.1)
PROCALCITONIN SERPL-MCNC: 0.4 NG/ML — HIGH (ref 0.02–0.1)
PROCALCITONIN SERPL-MCNC: 1.94 NG/ML — HIGH (ref 0.02–0.1)
PROT SERPL-MCNC: 4.9 G/DL — LOW (ref 6–8.3)
PROT SERPL-MCNC: 4.9 G/DL — LOW (ref 6–8.3)
PROT SERPL-MCNC: 5 G/DL — LOW (ref 6–8.3)
PROT SERPL-MCNC: 5.1 G/DL — LOW (ref 6–8.3)
PROT SERPL-MCNC: 5.2 G/DL — LOW (ref 6–8.3)
PROT SERPL-MCNC: 5.3 G/DL — LOW (ref 6–8.3)
PROT SERPL-MCNC: 5.5 G/DL — LOW (ref 6–8.3)
PROT SERPL-MCNC: 5.5 G/DL — LOW (ref 6–8.3)
PROT SERPL-MCNC: 5.6 G/DL — LOW (ref 6–8.3)
PROT SERPL-MCNC: 5.7 G/DL — LOW (ref 6–8.3)
PROT SERPL-MCNC: 5.7 G/DL — LOW (ref 6–8.3)
PROT SERPL-MCNC: 5.8 G/DL — LOW (ref 6–8.3)
PROT SERPL-MCNC: 5.8 G/DL — LOW (ref 6–8.3)
PROT UR-MCNC: 100 MG/DL
PROT UR-MCNC: 100 MG/DL
PROT UR-MCNC: NEGATIVE MG/DL — SIGNIFICANT CHANGE UP
PROTHROM AB SERPL-ACNC: 13.7 SEC — HIGH (ref 10.5–13.4)
PROTHROM AB SERPL-ACNC: 13.9 SEC — HIGH (ref 10.5–13.4)
PROTHROM AB SERPL-ACNC: 14.3 SEC — HIGH (ref 10.5–13.4)
PROTHROM AB SERPL-ACNC: 23.3 SEC — HIGH (ref 10.5–13.4)
PROTHROM AB SERPL-ACNC: 23.7 SEC — HIGH (ref 10.5–13.4)
PROTHROM AB SERPL-ACNC: 24.2 SEC — HIGH (ref 10.5–13.4)
RBC # BLD: 1.86 M/UL — LOW (ref 3.8–5.2)
RBC # BLD: 1.91 M/UL — LOW (ref 3.8–5.2)
RBC # BLD: 1.91 M/UL — LOW (ref 3.8–5.2)
RBC # BLD: 2.21 M/UL — LOW (ref 3.8–5.2)
RBC # BLD: 2.33 M/UL — LOW (ref 3.8–5.2)
RBC # BLD: 2.36 M/UL — LOW (ref 3.8–5.2)
RBC # BLD: 2.43 M/UL — LOW (ref 3.8–5.2)
RBC # BLD: 2.46 M/UL — LOW (ref 3.8–5.2)
RBC # BLD: 2.49 M/UL — LOW (ref 3.8–5.2)
RBC # BLD: 2.61 M/UL — LOW (ref 3.8–5.2)
RBC # BLD: 2.66 M/UL — LOW (ref 3.8–5.2)
RBC # BLD: 2.68 M/UL — LOW (ref 3.8–5.2)
RBC # BLD: 2.79 M/UL — LOW (ref 3.8–5.2)
RBC # BLD: 2.84 M/UL — LOW (ref 3.8–5.2)
RBC # BLD: 2.85 M/UL — LOW (ref 3.8–5.2)
RBC # BLD: 2.86 M/UL — LOW (ref 3.8–5.2)
RBC # BLD: 2.94 M/UL — LOW (ref 3.8–5.2)
RBC # BLD: 2.94 M/UL — LOW (ref 3.8–5.2)
RBC # BLD: 3.12 M/UL — LOW (ref 3.8–5.2)
RBC # BLD: 3.24 M/UL — LOW (ref 3.8–5.2)
RBC # BLD: 3.27 M/UL — LOW (ref 3.8–5.2)
RBC # BLD: 3.3 M/UL — LOW (ref 3.8–5.2)
RBC # BLD: 3.31 M/UL — LOW (ref 3.8–5.2)
RBC # BLD: 3.5 M/UL — LOW (ref 3.8–5.2)
RBC # FLD: 16.9 % — HIGH (ref 10.3–14.5)
RBC # FLD: 17.2 % — HIGH (ref 10.3–14.5)
RBC # FLD: 17.5 % — HIGH (ref 10.3–14.5)
RBC # FLD: 17.9 % — HIGH (ref 10.3–14.5)
RBC # FLD: 18.2 % — HIGH (ref 10.3–14.5)
RBC # FLD: 18.5 % — HIGH (ref 10.3–14.5)
RBC # FLD: 18.6 % — HIGH (ref 10.3–14.5)
RBC # FLD: 18.7 % — HIGH (ref 10.3–14.5)
RBC # FLD: 18.8 % — HIGH (ref 10.3–14.5)
RBC # FLD: 18.8 % — HIGH (ref 10.3–14.5)
RBC # FLD: 19.1 % — HIGH (ref 10.3–14.5)
RBC # FLD: 19.1 % — HIGH (ref 10.3–14.5)
RBC # FLD: 19.3 % — HIGH (ref 10.3–14.5)
RBC # FLD: 19.9 % — HIGH (ref 10.3–14.5)
RBC # FLD: 20.1 % — HIGH (ref 10.3–14.5)
RBC # FLD: 20.1 % — HIGH (ref 10.3–14.5)
RBC # FLD: 20.4 % — HIGH (ref 10.3–14.5)
RBC # FLD: 20.7 % — HIGH (ref 10.3–14.5)
RBC # FLD: 21.1 % — HIGH (ref 10.3–14.5)
RBC # FLD: 21.2 % — HIGH (ref 10.3–14.5)
RBC # FLD: 21.4 % — HIGH (ref 10.3–14.5)
RBC BLD AUTO: ABNORMAL
RBC CASTS # UR COMP ASSIST: < 5 /HPF — SIGNIFICANT CHANGE UP
RBC CASTS # UR COMP ASSIST: < 5 /HPF — SIGNIFICANT CHANGE UP
RBC CASTS # UR COMP ASSIST: > 10 /HPF
RETICS #: 204.4 K/UL — HIGH (ref 25–125)
RETICS/RBC NFR: 10.7 % — HIGH (ref 0.5–2.5)
RH IG SCN BLD-IMP: POSITIVE — SIGNIFICANT CHANGE UP
S AUREUS DNA NOSE QL NAA+PROBE: NEGATIVE — SIGNIFICANT CHANGE UP
SAO2 % BLDA: 98.6 % — HIGH (ref 94–98)
SAO2 % BLDA: 99.1 % — HIGH (ref 94–98)
SAO2 % BLDA: SIGNIFICANT CHANGE UP % (ref 94–98)
SAO2 % BLDV: 29.4 % — LOW (ref 67–88)
SMUDGE CELLS # BLD: PRESENT — SIGNIFICANT CHANGE UP
SODIUM SERPL-SCNC: 120 MMOL/L — CRITICAL LOW (ref 135–145)
SODIUM SERPL-SCNC: 122 MMOL/L — LOW (ref 135–145)
SODIUM SERPL-SCNC: 122 MMOL/L — LOW (ref 135–145)
SODIUM SERPL-SCNC: 125 MMOL/L — LOW (ref 135–145)
SODIUM SERPL-SCNC: 127 MMOL/L — LOW (ref 135–145)
SODIUM SERPL-SCNC: 127 MMOL/L — LOW (ref 135–145)
SODIUM SERPL-SCNC: 128 MMOL/L — LOW (ref 135–145)
SODIUM SERPL-SCNC: 128 MMOL/L — LOW (ref 135–145)
SODIUM SERPL-SCNC: 129 MMOL/L — LOW (ref 135–145)
SODIUM SERPL-SCNC: 129 MMOL/L — LOW (ref 135–145)
SODIUM SERPL-SCNC: 131 MMOL/L — LOW (ref 135–145)
SODIUM SERPL-SCNC: 134 MMOL/L — LOW (ref 135–145)
SODIUM SERPL-SCNC: 136 MMOL/L — SIGNIFICANT CHANGE UP (ref 135–145)
SODIUM SERPL-SCNC: 136 MMOL/L — SIGNIFICANT CHANGE UP (ref 135–145)
SODIUM SERPL-SCNC: 139 MMOL/L — SIGNIFICANT CHANGE UP (ref 135–145)
SODIUM SERPL-SCNC: 139 MMOL/L — SIGNIFICANT CHANGE UP (ref 135–145)
SODIUM SERPL-SCNC: 141 MMOL/L — SIGNIFICANT CHANGE UP (ref 135–145)
SODIUM SERPL-SCNC: 144 MMOL/L — SIGNIFICANT CHANGE UP (ref 135–145)
SODIUM SERPL-SCNC: 145 MMOL/L — SIGNIFICANT CHANGE UP (ref 135–145)
SODIUM SERPL-SCNC: 145 MMOL/L — SIGNIFICANT CHANGE UP (ref 135–145)
SODIUM SERPL-SCNC: 146 MMOL/L — HIGH (ref 135–145)
SODIUM UR-SCNC: 149 MMOL/L — SIGNIFICANT CHANGE UP
SODIUM UR-SCNC: 189 MMOL/L — SIGNIFICANT CHANGE UP
SP GR SPEC: 1.01 — SIGNIFICANT CHANGE UP (ref 1–1.03)
SP GR SPEC: 1.02 — SIGNIFICANT CHANGE UP (ref 1–1.03)
SP GR SPEC: >=1.03 — SIGNIFICANT CHANGE UP (ref 1–1.03)
SPECIMEN SOURCE: SIGNIFICANT CHANGE UP
SPHEROCYTES BLD QL SMEAR: SLIGHT — SIGNIFICANT CHANGE UP
STOMATOCYTES BLD QL SMEAR: SIGNIFICANT CHANGE UP
STOMATOCYTES BLD QL SMEAR: SLIGHT — SIGNIFICANT CHANGE UP
STOMATOCYTES BLD QL SMEAR: SLIGHT — SIGNIFICANT CHANGE UP
SURGICAL PATHOLOGY STUDY: SIGNIFICANT CHANGE UP
T4 FREE SERPL-MCNC: 1.6 NG/DL — SIGNIFICANT CHANGE UP (ref 0.93–1.7)
TARGETS BLD QL SMEAR: SIGNIFICANT CHANGE UP
TARGETS BLD QL SMEAR: SLIGHT — SIGNIFICANT CHANGE UP
TARGETS BLD QL SMEAR: SLIGHT — SIGNIFICANT CHANGE UP
TSH SERPL-MCNC: 3.02 UIU/ML — SIGNIFICANT CHANGE UP (ref 0.27–4.2)
TSH SERPL-MCNC: 3.35 UIU/ML — SIGNIFICANT CHANGE UP (ref 0.27–4.2)
URATE SERPL-MCNC: 1.4 MG/DL — LOW (ref 2.5–7)
UROBILINOGEN FLD QL: 0.2 E.U./DL — SIGNIFICANT CHANGE UP
UROBILINOGEN FLD QL: 1 E.U./DL — SIGNIFICANT CHANGE UP
UROBILINOGEN FLD QL: 1 E.U./DL — SIGNIFICANT CHANGE UP
VANCOMYCIN TROUGH SERPL-MCNC: 5.8 UG/ML — LOW (ref 10–20)
VARIANT LYMPHS # BLD: 0.9 % — SIGNIFICANT CHANGE UP (ref 0–6)
WBC # BLD: 14.31 K/UL — HIGH (ref 3.8–10.5)
WBC # BLD: 17.38 K/UL — HIGH (ref 3.8–10.5)
WBC # BLD: 17.74 K/UL — HIGH (ref 3.8–10.5)
WBC # BLD: 18.26 K/UL — HIGH (ref 3.8–10.5)
WBC # BLD: 18.68 K/UL — HIGH (ref 3.8–10.5)
WBC # BLD: 18.78 K/UL — HIGH (ref 3.8–10.5)
WBC # BLD: 19.05 K/UL — HIGH (ref 3.8–10.5)
WBC # BLD: 21.23 K/UL — HIGH (ref 3.8–10.5)
WBC # BLD: 21.6 K/UL — HIGH (ref 3.8–10.5)
WBC # BLD: 21.94 K/UL — HIGH (ref 3.8–10.5)
WBC # BLD: 22.35 K/UL — HIGH (ref 3.8–10.5)
WBC # BLD: 22.91 K/UL — HIGH (ref 3.8–10.5)
WBC # BLD: 25.12 K/UL — HIGH (ref 3.8–10.5)
WBC # BLD: 25.93 K/UL — HIGH (ref 3.8–10.5)
WBC # BLD: 26.34 K/UL — HIGH (ref 3.8–10.5)
WBC # BLD: 27.05 K/UL — HIGH (ref 3.8–10.5)
WBC # BLD: 27.83 K/UL — HIGH (ref 3.8–10.5)
WBC # BLD: 28.54 K/UL — HIGH (ref 3.8–10.5)
WBC # BLD: 28.6 K/UL — HIGH (ref 3.8–10.5)
WBC # BLD: 29.86 K/UL — HIGH (ref 3.8–10.5)
WBC # BLD: 30.99 K/UL — HIGH (ref 3.8–10.5)
WBC # BLD: 34.27 K/UL — HIGH (ref 3.8–10.5)
WBC # BLD: 35.1 K/UL — HIGH (ref 3.8–10.5)
WBC # FLD AUTO: 14.31 K/UL — HIGH (ref 3.8–10.5)
WBC # FLD AUTO: 17.38 K/UL — HIGH (ref 3.8–10.5)
WBC # FLD AUTO: 17.74 K/UL — HIGH (ref 3.8–10.5)
WBC # FLD AUTO: 18.26 K/UL — HIGH (ref 3.8–10.5)
WBC # FLD AUTO: 18.68 K/UL — HIGH (ref 3.8–10.5)
WBC # FLD AUTO: 18.78 K/UL — HIGH (ref 3.8–10.5)
WBC # FLD AUTO: 19.05 K/UL — HIGH (ref 3.8–10.5)
WBC # FLD AUTO: 21.23 K/UL — HIGH (ref 3.8–10.5)
WBC # FLD AUTO: 21.6 K/UL — HIGH (ref 3.8–10.5)
WBC # FLD AUTO: 21.94 K/UL — HIGH (ref 3.8–10.5)
WBC # FLD AUTO: 22.35 K/UL — HIGH (ref 3.8–10.5)
WBC # FLD AUTO: 22.91 K/UL — HIGH (ref 3.8–10.5)
WBC # FLD AUTO: 25.12 K/UL — HIGH (ref 3.8–10.5)
WBC # FLD AUTO: 25.93 K/UL — HIGH (ref 3.8–10.5)
WBC # FLD AUTO: 26.34 K/UL — HIGH (ref 3.8–10.5)
WBC # FLD AUTO: 27.05 K/UL — HIGH (ref 3.8–10.5)
WBC # FLD AUTO: 27.83 K/UL — HIGH (ref 3.8–10.5)
WBC # FLD AUTO: 28.54 K/UL — HIGH (ref 3.8–10.5)
WBC # FLD AUTO: 28.6 K/UL — HIGH (ref 3.8–10.5)
WBC # FLD AUTO: 29.86 K/UL — HIGH (ref 3.8–10.5)
WBC # FLD AUTO: 30.99 K/UL — HIGH (ref 3.8–10.5)
WBC # FLD AUTO: 34.27 K/UL — HIGH (ref 3.8–10.5)
WBC # FLD AUTO: 35.1 K/UL — HIGH (ref 3.8–10.5)
WBC UR QL: < 5 /HPF — SIGNIFICANT CHANGE UP
WBC UR QL: ABNORMAL /HPF
WBC UR QL: ABNORMAL /HPF

## 2023-01-01 PROCEDURE — 99291 CRITICAL CARE FIRST HOUR: CPT

## 2023-01-01 PROCEDURE — 43238 EGD US FINE NEEDLE BX/ASPIR: CPT | Mod: GC

## 2023-01-01 PROCEDURE — 92526 ORAL FUNCTION THERAPY: CPT

## 2023-01-01 PROCEDURE — 71045 X-RAY EXAM CHEST 1 VIEW: CPT | Mod: 26

## 2023-01-01 PROCEDURE — 86901 BLOOD TYPING SEROLOGIC RH(D): CPT

## 2023-01-01 PROCEDURE — 85025 COMPLETE CBC W/AUTO DIFF WBC: CPT

## 2023-01-01 PROCEDURE — 99233 SBSQ HOSP IP/OBS HIGH 50: CPT | Mod: GC

## 2023-01-01 PROCEDURE — 86923 COMPATIBILITY TEST ELECTRIC: CPT

## 2023-01-01 PROCEDURE — 99232 SBSQ HOSP IP/OBS MODERATE 35: CPT

## 2023-01-01 PROCEDURE — 87040 BLOOD CULTURE FOR BACTERIA: CPT

## 2023-01-01 PROCEDURE — 84295 ASSAY OF SERUM SODIUM: CPT

## 2023-01-01 PROCEDURE — 87070 CULTURE OTHR SPECIMN AEROBIC: CPT

## 2023-01-01 PROCEDURE — 88341 IMHCHEM/IMCYTCHM EA ADD ANTB: CPT

## 2023-01-01 PROCEDURE — 99232 SBSQ HOSP IP/OBS MODERATE 35: CPT | Mod: GC

## 2023-01-01 PROCEDURE — 80048 BASIC METABOLIC PNL TOTAL CA: CPT

## 2023-01-01 PROCEDURE — 96365 THER/PROPH/DIAG IV INF INIT: CPT

## 2023-01-01 PROCEDURE — 86850 RBC ANTIBODY SCREEN: CPT

## 2023-01-01 PROCEDURE — 99233 SBSQ HOSP IP/OBS HIGH 50: CPT

## 2023-01-01 PROCEDURE — 88305 TISSUE EXAM BY PATHOLOGIST: CPT

## 2023-01-01 PROCEDURE — 85384 FIBRINOGEN ACTIVITY: CPT

## 2023-01-01 PROCEDURE — 94003 VENT MGMT INPAT SUBQ DAY: CPT

## 2023-01-01 PROCEDURE — 82378 CARCINOEMBRYONIC ANTIGEN: CPT

## 2023-01-01 PROCEDURE — 97161 PT EVAL LOW COMPLEX 20 MIN: CPT

## 2023-01-01 PROCEDURE — C1769: CPT

## 2023-01-01 PROCEDURE — P9016: CPT

## 2023-01-01 PROCEDURE — 87449 NOS EACH ORGANISM AG IA: CPT

## 2023-01-01 PROCEDURE — 99497 ADVNCD CARE PLAN 30 MIN: CPT | Mod: 25

## 2023-01-01 PROCEDURE — 87507 IADNA-DNA/RNA PROBE TQ 12-25: CPT

## 2023-01-01 PROCEDURE — 43239 EGD BIOPSY SINGLE/MULTIPLE: CPT | Mod: 59,GC

## 2023-01-01 PROCEDURE — 43266 EGD ENDOSCOPIC STENT PLACE: CPT

## 2023-01-01 PROCEDURE — 99223 1ST HOSP IP/OBS HIGH 75: CPT

## 2023-01-01 PROCEDURE — 87324 CLOSTRIDIUM AG IA: CPT

## 2023-01-01 PROCEDURE — 83010 ASSAY OF HAPTOGLOBIN QUANT: CPT

## 2023-01-01 PROCEDURE — 82533 TOTAL CORTISOL: CPT

## 2023-01-01 PROCEDURE — 82550 ASSAY OF CK (CPK): CPT

## 2023-01-01 PROCEDURE — 83540 ASSAY OF IRON: CPT

## 2023-01-01 PROCEDURE — 36415 COLL VENOUS BLD VENIPUNCTURE: CPT

## 2023-01-01 PROCEDURE — 71260 CT THORAX DX C+: CPT | Mod: 26,MA

## 2023-01-01 PROCEDURE — 43242 EGD US FINE NEEDLE BX/ASPIR: CPT

## 2023-01-01 PROCEDURE — 97530 THERAPEUTIC ACTIVITIES: CPT

## 2023-01-01 PROCEDURE — 92610 EVALUATE SWALLOWING FUNCTION: CPT

## 2023-01-01 PROCEDURE — 85027 COMPLETE CBC AUTOMATED: CPT

## 2023-01-01 PROCEDURE — 84550 ASSAY OF BLOOD/URIC ACID: CPT

## 2023-01-01 PROCEDURE — 86900 BLOOD TYPING SEROLOGIC ABO: CPT

## 2023-01-01 PROCEDURE — 99222 1ST HOSP IP/OBS MODERATE 55: CPT

## 2023-01-01 PROCEDURE — 36430 TRANSFUSION BLD/BLD COMPNT: CPT

## 2023-01-01 PROCEDURE — 43247 EGD REMOVE FOREIGN BODY: CPT

## 2023-01-01 PROCEDURE — 74177 CT ABD & PELVIS W/CONTRAST: CPT | Mod: 26,MA

## 2023-01-01 PROCEDURE — 84443 ASSAY THYROID STIM HORMONE: CPT

## 2023-01-01 PROCEDURE — 80202 ASSAY OF VANCOMYCIN: CPT

## 2023-01-01 PROCEDURE — 87086 URINE CULTURE/COLONY COUNT: CPT

## 2023-01-01 PROCEDURE — 83735 ASSAY OF MAGNESIUM: CPT

## 2023-01-01 PROCEDURE — 99498 ADVNCD CARE PLAN ADDL 30 MIN: CPT | Mod: 25

## 2023-01-01 PROCEDURE — 83605 ASSAY OF LACTIC ACID: CPT

## 2023-01-01 PROCEDURE — 82553 CREATINE MB FRACTION: CPT

## 2023-01-01 PROCEDURE — 99358 PROLONG SERVICE W/O CONTACT: CPT | Mod: NC

## 2023-01-01 PROCEDURE — 82728 ASSAY OF FERRITIN: CPT

## 2023-01-01 PROCEDURE — 82746 ASSAY OF FOLIC ACID SERUM: CPT

## 2023-01-01 PROCEDURE — 88305 TISSUE EXAM BY PATHOLOGIST: CPT | Mod: 26

## 2023-01-01 PROCEDURE — 99221 1ST HOSP IP/OBS SF/LOW 40: CPT

## 2023-01-01 PROCEDURE — 83615 LACTATE (LD) (LDH) ENZYME: CPT

## 2023-01-01 PROCEDURE — 81001 URINALYSIS AUTO W/SCOPE: CPT

## 2023-01-01 PROCEDURE — 93010 ELECTROCARDIOGRAM REPORT: CPT

## 2023-01-01 PROCEDURE — 82140 ASSAY OF AMMONIA: CPT

## 2023-01-01 PROCEDURE — 96375 TX/PRO/DX INJ NEW DRUG ADDON: CPT

## 2023-01-01 PROCEDURE — 80074 ACUTE HEPATITIS PANEL: CPT

## 2023-01-01 PROCEDURE — 84100 ASSAY OF PHOSPHORUS: CPT

## 2023-01-01 PROCEDURE — 71045 X-RAY EXAM CHEST 1 VIEW: CPT

## 2023-01-01 PROCEDURE — 83930 ASSAY OF BLOOD OSMOLALITY: CPT

## 2023-01-01 PROCEDURE — 88342 IMHCHEM/IMCYTCHM 1ST ANTB: CPT | Mod: 26

## 2023-01-01 PROCEDURE — 82247 BILIRUBIN TOTAL: CPT

## 2023-01-01 PROCEDURE — 74328 X-RAY BILE DUCT ENDOSCOPY: CPT | Mod: 26

## 2023-01-01 PROCEDURE — 83935 ASSAY OF URINE OSMOLALITY: CPT

## 2023-01-01 PROCEDURE — 85730 THROMBOPLASTIN TIME PARTIAL: CPT

## 2023-01-01 PROCEDURE — 80053 COMPREHEN METABOLIC PANEL: CPT

## 2023-01-01 PROCEDURE — 84132 ASSAY OF SERUM POTASSIUM: CPT

## 2023-01-01 PROCEDURE — 86301 IMMUNOASSAY TUMOR CA 19-9: CPT

## 2023-01-01 PROCEDURE — 84439 ASSAY OF FREE THYROXINE: CPT

## 2023-01-01 PROCEDURE — 99231 SBSQ HOSP IP/OBS SF/LOW 25: CPT

## 2023-01-01 PROCEDURE — 82962 GLUCOSE BLOOD TEST: CPT

## 2023-01-01 PROCEDURE — 88341 IMHCHEM/IMCYTCHM EA ADD ANTB: CPT | Mod: 26

## 2023-01-01 PROCEDURE — 94002 VENT MGMT INPAT INIT DAY: CPT

## 2023-01-01 PROCEDURE — 82330 ASSAY OF CALCIUM: CPT

## 2023-01-01 PROCEDURE — 99291 CRITICAL CARE FIRST HOUR: CPT | Mod: 25

## 2023-01-01 PROCEDURE — 87640 STAPH A DNA AMP PROBE: CPT

## 2023-01-01 PROCEDURE — 93005 ELECTROCARDIOGRAM TRACING: CPT

## 2023-01-01 PROCEDURE — 71260 CT THORAX DX C+: CPT | Mod: MA

## 2023-01-01 PROCEDURE — 99231 SBSQ HOSP IP/OBS SF/LOW 25: CPT | Mod: GC

## 2023-01-01 PROCEDURE — 83550 IRON BINDING TEST: CPT

## 2023-01-01 PROCEDURE — 82803 BLOOD GASES ANY COMBINATION: CPT

## 2023-01-01 PROCEDURE — C1874: CPT

## 2023-01-01 PROCEDURE — 82248 BILIRUBIN DIRECT: CPT

## 2023-01-01 PROCEDURE — 74330 X-RAY BILE/PANC ENDOSCOPY: CPT

## 2023-01-01 PROCEDURE — 99223 1ST HOSP IP/OBS HIGH 75: CPT | Mod: GC

## 2023-01-01 PROCEDURE — 85610 PROTHROMBIN TIME: CPT

## 2023-01-01 PROCEDURE — 84145 PROCALCITONIN (PCT): CPT

## 2023-01-01 PROCEDURE — P9040: CPT

## 2023-01-01 PROCEDURE — 74177 CT ABD & PELVIS W/CONTRAST: CPT | Mod: MA

## 2023-01-01 PROCEDURE — 84300 ASSAY OF URINE SODIUM: CPT

## 2023-01-01 PROCEDURE — 87641 MR-STAPH DNA AMP PROBE: CPT

## 2023-01-01 PROCEDURE — 85045 AUTOMATED RETICULOCYTE COUNT: CPT

## 2023-01-01 PROCEDURE — 83690 ASSAY OF LIPASE: CPT

## 2023-01-01 DEVICE — STENT AXIOS 10X15MM: Type: IMPLANTABLE DEVICE | Status: FUNCTIONAL

## 2023-01-01 DEVICE — GUIDEWIRE FUSION OASIS 10FR: Type: IMPLANTABLE DEVICE | Status: FUNCTIONAL

## 2023-01-01 DEVICE — HYDRA WIRE: Type: IMPLANTABLE DEVICE | Status: FUNCTIONAL

## 2023-01-01 RX ORDER — POTASSIUM PHOSPHATE, MONOBASIC POTASSIUM PHOSPHATE, DIBASIC 236; 224 MG/ML; MG/ML
30 INJECTION, SOLUTION INTRAVENOUS ONCE
Refills: 0 | Status: COMPLETED | OUTPATIENT
Start: 2023-01-01 | End: 2023-01-01

## 2023-01-01 RX ORDER — ACETAMINOPHEN 500 MG
675 TABLET ORAL ONCE
Refills: 0 | Status: COMPLETED | OUTPATIENT
Start: 2023-01-01 | End: 2023-01-01

## 2023-01-01 RX ORDER — POLYETHYLENE GLYCOL 3350 17 G/17G
17 POWDER, FOR SOLUTION ORAL EVERY 24 HOURS
Refills: 0 | Status: DISCONTINUED | OUTPATIENT
Start: 2023-01-01 | End: 2023-01-01

## 2023-01-01 RX ORDER — MEROPENEM 1 G/30ML
1000 INJECTION INTRAVENOUS EVERY 8 HOURS
Refills: 0 | Status: COMPLETED | OUTPATIENT
Start: 2023-01-01 | End: 2023-01-01

## 2023-01-01 RX ORDER — ACETAMINOPHEN 500 MG
675 TABLET ORAL EVERY 8 HOURS
Refills: 0 | Status: DISCONTINUED | OUTPATIENT
Start: 2023-01-01 | End: 2023-01-01

## 2023-01-01 RX ORDER — ONDANSETRON 8 MG/1
4 TABLET, FILM COATED ORAL ONCE
Refills: 0 | Status: DISCONTINUED | OUTPATIENT
Start: 2023-01-01 | End: 2023-01-01

## 2023-01-01 RX ORDER — DONEPEZIL HYDROCHLORIDE 10 MG/1
1 TABLET, FILM COATED ORAL
Refills: 0 | DISCHARGE

## 2023-01-01 RX ORDER — IRBESARTAN 75 MG/1
0.5 TABLET ORAL
Refills: 0 | DISCHARGE

## 2023-01-01 RX ORDER — METOCLOPRAMIDE HCL 10 MG
10 TABLET ORAL EVERY 6 HOURS
Refills: 0 | Status: DISCONTINUED | OUTPATIENT
Start: 2023-01-01 | End: 2023-01-01

## 2023-01-01 RX ORDER — PIPERACILLIN AND TAZOBACTAM 4; .5 G/20ML; G/20ML
3.38 INJECTION, POWDER, LYOPHILIZED, FOR SOLUTION INTRAVENOUS EVERY 8 HOURS
Refills: 0 | Status: DISCONTINUED | OUTPATIENT
Start: 2023-01-01 | End: 2023-01-01

## 2023-01-01 RX ORDER — PHYTONADIONE (VIT K1) 5 MG
10 TABLET ORAL ONCE
Refills: 0 | Status: DISCONTINUED | OUTPATIENT
Start: 2023-01-01 | End: 2023-01-01

## 2023-01-01 RX ORDER — FERROUS FUMARATE 350(115)MG
1 TABLET ORAL
Refills: 0 | DISCHARGE

## 2023-01-01 RX ORDER — PHYTONADIONE (VIT K1) 5 MG
10 TABLET ORAL ONCE
Refills: 0 | Status: COMPLETED | OUTPATIENT
Start: 2023-01-01 | End: 2023-01-01

## 2023-01-01 RX ORDER — PANTOPRAZOLE SODIUM 20 MG/1
40 TABLET, DELAYED RELEASE ORAL EVERY 12 HOURS
Refills: 0 | Status: DISCONTINUED | OUTPATIENT
Start: 2023-01-01 | End: 2023-01-01

## 2023-01-01 RX ORDER — PANTOPRAZOLE SODIUM 20 MG/1
8 TABLET, DELAYED RELEASE ORAL
Qty: 80 | Refills: 0 | Status: DISCONTINUED | OUTPATIENT
Start: 2023-01-01 | End: 2023-01-01

## 2023-01-01 RX ORDER — SODIUM CHLORIDE 9 MG/ML
1500 INJECTION INTRAMUSCULAR; INTRAVENOUS; SUBCUTANEOUS ONCE
Refills: 0 | Status: COMPLETED | OUTPATIENT
Start: 2023-01-01 | End: 2023-01-01

## 2023-01-01 RX ORDER — SODIUM CHLORIDE 9 MG/ML
500 INJECTION, SOLUTION INTRAVENOUS ONCE
Refills: 0 | Status: COMPLETED | OUTPATIENT
Start: 2023-01-01 | End: 2023-01-01

## 2023-01-01 RX ORDER — ACETAMINOPHEN 500 MG
1000 TABLET ORAL ONCE
Refills: 0 | Status: COMPLETED | OUTPATIENT
Start: 2023-01-01 | End: 2023-01-01

## 2023-01-01 RX ORDER — PANTOPRAZOLE SODIUM 20 MG/1
40 TABLET, DELAYED RELEASE ORAL ONCE
Refills: 0 | Status: COMPLETED | OUTPATIENT
Start: 2023-01-01 | End: 2023-01-01

## 2023-01-01 RX ORDER — POTASSIUM PHOSPHATE, MONOBASIC POTASSIUM PHOSPHATE, DIBASIC 236; 224 MG/ML; MG/ML
15 INJECTION, SOLUTION INTRAVENOUS ONCE
Refills: 0 | Status: COMPLETED | OUTPATIENT
Start: 2023-01-01 | End: 2023-01-01

## 2023-01-01 RX ORDER — PIPERACILLIN AND TAZOBACTAM 4; .5 G/20ML; G/20ML
3.38 INJECTION, POWDER, LYOPHILIZED, FOR SOLUTION INTRAVENOUS ONCE
Refills: 0 | Status: COMPLETED | OUTPATIENT
Start: 2023-01-01 | End: 2023-01-01

## 2023-01-01 RX ORDER — CHLORHEXIDINE GLUCONATE 213 G/1000ML
15 SOLUTION TOPICAL EVERY 12 HOURS
Refills: 0 | Status: DISCONTINUED | OUTPATIENT
Start: 2023-01-01 | End: 2023-01-01

## 2023-01-01 RX ORDER — ONDANSETRON 8 MG/1
4 TABLET, FILM COATED ORAL EVERY 8 HOURS
Refills: 0 | Status: DISCONTINUED | OUTPATIENT
Start: 2023-01-01 | End: 2023-01-01

## 2023-01-01 RX ORDER — VANCOMYCIN HCL 1 G
750 VIAL (EA) INTRAVENOUS EVERY 12 HOURS
Refills: 0 | Status: COMPLETED | OUTPATIENT
Start: 2023-01-01 | End: 2023-01-01

## 2023-01-01 RX ORDER — FENTANYL CITRATE 50 UG/ML
0.5 INJECTION INTRAVENOUS
Qty: 2500 | Refills: 0 | Status: DISCONTINUED | OUTPATIENT
Start: 2023-01-01 | End: 2023-01-01

## 2023-01-01 RX ORDER — POTASSIUM CHLORIDE 20 MEQ
10 PACKET (EA) ORAL
Refills: 0 | Status: COMPLETED | OUTPATIENT
Start: 2023-01-01 | End: 2023-01-01

## 2023-01-01 RX ORDER — PROPOFOL 10 MG/ML
35 INJECTION, EMULSION INTRAVENOUS
Qty: 1000 | Refills: 0 | Status: DISCONTINUED | OUTPATIENT
Start: 2023-01-01 | End: 2023-01-01

## 2023-01-01 RX ORDER — SODIUM CHLORIDE 9 MG/ML
1000 INJECTION INTRAMUSCULAR; INTRAVENOUS; SUBCUTANEOUS ONCE
Refills: 0 | Status: COMPLETED | OUTPATIENT
Start: 2023-01-01 | End: 2023-01-01

## 2023-01-01 RX ORDER — MAGNESIUM SULFATE 500 MG/ML
2 VIAL (ML) INJECTION ONCE
Refills: 0 | Status: COMPLETED | OUTPATIENT
Start: 2023-01-01 | End: 2023-01-01

## 2023-01-01 RX ORDER — TRANEXAMIC ACID 100 MG/ML
1000 INJECTION, SOLUTION INTRAVENOUS ONCE
Refills: 0 | Status: DISCONTINUED | OUTPATIENT
Start: 2023-01-01 | End: 2023-01-01

## 2023-01-01 RX ORDER — POTASSIUM CHLORIDE 20 MEQ
40 PACKET (EA) ORAL ONCE
Refills: 0 | Status: COMPLETED | OUTPATIENT
Start: 2023-01-01 | End: 2023-01-01

## 2023-01-01 RX ORDER — CHLORHEXIDINE GLUCONATE 213 G/1000ML
1 SOLUTION TOPICAL
Refills: 0 | Status: DISCONTINUED | OUTPATIENT
Start: 2023-01-01 | End: 2023-01-01

## 2023-01-01 RX ORDER — VANCOMYCIN HCL 1 G
500 VIAL (EA) INTRAVENOUS EVERY 12 HOURS
Refills: 0 | Status: COMPLETED | OUTPATIENT
Start: 2023-01-01 | End: 2023-01-01

## 2023-01-01 RX ORDER — MAGNESIUM SULFATE 500 MG/ML
1 VIAL (ML) INJECTION ONCE
Refills: 0 | Status: COMPLETED | OUTPATIENT
Start: 2023-01-01 | End: 2023-01-01

## 2023-01-01 RX ORDER — ONDANSETRON 8 MG/1
4 TABLET, FILM COATED ORAL ONCE
Refills: 0 | Status: COMPLETED | OUTPATIENT
Start: 2023-01-01 | End: 2023-01-01

## 2023-01-01 RX ORDER — CALCIUM CARBONATE 500(1250)
1 TABLET ORAL
Refills: 0 | DISCHARGE

## 2023-01-01 RX ORDER — MEROPENEM 1 G/30ML
1000 INJECTION INTRAVENOUS EVERY 8 HOURS
Refills: 0 | Status: DISCONTINUED | OUTPATIENT
Start: 2023-01-01 | End: 2023-01-01

## 2023-01-01 RX ORDER — METOCLOPRAMIDE HCL 10 MG
10 TABLET ORAL EVERY 8 HOURS
Refills: 0 | Status: DISCONTINUED | OUTPATIENT
Start: 2023-01-01 | End: 2023-01-01

## 2023-01-01 RX ORDER — VANCOMYCIN HCL 1 G
1000 VIAL (EA) INTRAVENOUS ONCE
Refills: 0 | Status: COMPLETED | OUTPATIENT
Start: 2023-01-01 | End: 2023-01-01

## 2023-01-01 RX ORDER — MEMANTINE HYDROCHLORIDE 10 MG/1
1 TABLET ORAL
Refills: 0 | DISCHARGE

## 2023-01-01 RX ORDER — TRANEXAMIC ACID 100 MG/ML
1000 INJECTION, SOLUTION INTRAVENOUS ONCE
Refills: 0 | Status: COMPLETED | OUTPATIENT
Start: 2023-01-01 | End: 2023-01-01

## 2023-01-01 RX ORDER — MORPHINE SULFATE 50 MG/1
2 CAPSULE, EXTENDED RELEASE ORAL
Refills: 0 | Status: DISCONTINUED | OUTPATIENT
Start: 2023-01-01 | End: 2023-01-01

## 2023-01-01 RX ORDER — PROPOFOL 10 MG/ML
0.05 INJECTION, EMULSION INTRAVENOUS
Qty: 500 | Refills: 0 | Status: DISCONTINUED | OUTPATIENT
Start: 2023-01-01 | End: 2023-01-01

## 2023-01-01 RX ORDER — ACETAMINOPHEN 500 MG
650 TABLET ORAL EVERY 6 HOURS
Refills: 0 | Status: DISCONTINUED | OUTPATIENT
Start: 2023-01-01 | End: 2023-01-01

## 2023-01-01 RX ORDER — ICOSAPENT ETHYL 500 MG/1
2 CAPSULE, LIQUID FILLED ORAL
Refills: 0 | DISCHARGE

## 2023-01-01 RX ORDER — FLUCONAZOLE 150 MG/1
800 TABLET ORAL ONCE
Refills: 0 | Status: COMPLETED | OUTPATIENT
Start: 2023-01-01 | End: 2023-01-01

## 2023-01-01 RX ORDER — RALOXIFENE HYDROCHLORIDE 60 MG/1
1 TABLET, COATED ORAL
Refills: 0 | DISCHARGE

## 2023-01-01 RX ORDER — DEXMEDETOMIDINE HYDROCHLORIDE IN 0.9% SODIUM CHLORIDE 4 UG/ML
0.1 INJECTION INTRAVENOUS
Qty: 400 | Refills: 0 | Status: DISCONTINUED | OUTPATIENT
Start: 2023-01-01 | End: 2023-01-01

## 2023-01-01 RX ORDER — PANTOPRAZOLE SODIUM 20 MG/1
80 TABLET, DELAYED RELEASE ORAL ONCE
Refills: 0 | Status: COMPLETED | OUTPATIENT
Start: 2023-01-01 | End: 2023-01-01

## 2023-01-01 RX ORDER — POLYETHYLENE GLYCOL 3350 17 G/17G
17 POWDER, FOR SOLUTION ORAL
Refills: 0 | Status: DISCONTINUED | OUTPATIENT
Start: 2023-01-01 | End: 2023-01-01

## 2023-01-01 RX ADMIN — PANTOPRAZOLE SODIUM 40 MILLIGRAM(S): 20 TABLET, DELAYED RELEASE ORAL at 13:11

## 2023-01-01 RX ADMIN — PIPERACILLIN AND TAZOBACTAM 25 GRAM(S): 4; .5 INJECTION, POWDER, LYOPHILIZED, FOR SOLUTION INTRAVENOUS at 15:22

## 2023-01-01 RX ADMIN — SODIUM CHLORIDE 500 MILLILITER(S): 9 INJECTION, SOLUTION INTRAVENOUS at 10:14

## 2023-01-01 RX ADMIN — Medication 10 MILLIGRAM(S): at 10:00

## 2023-01-01 RX ADMIN — CHLORHEXIDINE GLUCONATE 1 APPLICATION(S): 213 SOLUTION TOPICAL at 05:53

## 2023-01-01 RX ADMIN — PANTOPRAZOLE SODIUM 40 MILLIGRAM(S): 20 TABLET, DELAYED RELEASE ORAL at 14:58

## 2023-01-01 RX ADMIN — Medication 25 GRAM(S): at 10:00

## 2023-01-01 RX ADMIN — PIPERACILLIN AND TAZOBACTAM 25 GRAM(S): 4; .5 INJECTION, POWDER, LYOPHILIZED, FOR SOLUTION INTRAVENOUS at 21:59

## 2023-01-01 RX ADMIN — PIPERACILLIN AND TAZOBACTAM 25 GRAM(S): 4; .5 INJECTION, POWDER, LYOPHILIZED, FOR SOLUTION INTRAVENOUS at 13:12

## 2023-01-01 RX ADMIN — Medication 10 MILLIGRAM(S): at 06:24

## 2023-01-01 RX ADMIN — PIPERACILLIN AND TAZOBACTAM 25 GRAM(S): 4; .5 INJECTION, POWDER, LYOPHILIZED, FOR SOLUTION INTRAVENOUS at 06:31

## 2023-01-01 RX ADMIN — Medication 650 MILLIGRAM(S): at 09:59

## 2023-01-01 RX ADMIN — Medication 100 MILLIEQUIVALENT(S): at 18:52

## 2023-01-01 RX ADMIN — Medication 675 MILLIGRAM(S): at 01:39

## 2023-01-01 RX ADMIN — PANTOPRAZOLE SODIUM 40 MILLIGRAM(S): 20 TABLET, DELAYED RELEASE ORAL at 18:22

## 2023-01-01 RX ADMIN — MEROPENEM 100 MILLIGRAM(S): 1 INJECTION INTRAVENOUS at 19:10

## 2023-01-01 RX ADMIN — Medication 10 MILLIGRAM(S): at 12:13

## 2023-01-01 RX ADMIN — Medication 650 MILLIGRAM(S): at 11:42

## 2023-01-01 RX ADMIN — PANTOPRAZOLE SODIUM 40 MILLIGRAM(S): 20 TABLET, DELAYED RELEASE ORAL at 13:03

## 2023-01-01 RX ADMIN — POTASSIUM PHOSPHATE, MONOBASIC POTASSIUM PHOSPHATE, DIBASIC 62.5 MILLIMOLE(S): 236; 224 INJECTION, SOLUTION INTRAVENOUS at 08:23

## 2023-01-01 RX ADMIN — PANTOPRAZOLE SODIUM 40 MILLIGRAM(S): 20 TABLET, DELAYED RELEASE ORAL at 01:27

## 2023-01-01 RX ADMIN — PANTOPRAZOLE SODIUM 40 MILLIGRAM(S): 20 TABLET, DELAYED RELEASE ORAL at 00:32

## 2023-01-01 RX ADMIN — Medication 675 MILLIGRAM(S): at 18:47

## 2023-01-01 RX ADMIN — PANTOPRAZOLE SODIUM 40 MILLIGRAM(S): 20 TABLET, DELAYED RELEASE ORAL at 20:21

## 2023-01-01 RX ADMIN — Medication 10 MILLIGRAM(S): at 12:22

## 2023-01-01 RX ADMIN — TRANEXAMIC ACID 220 MILLIGRAM(S): 100 INJECTION, SOLUTION INTRAVENOUS at 13:29

## 2023-01-01 RX ADMIN — Medication 270 MILLIGRAM(S): at 00:39

## 2023-01-01 RX ADMIN — Medication 100 MILLIGRAM(S): at 18:46

## 2023-01-01 RX ADMIN — PANTOPRAZOLE SODIUM 40 MILLIGRAM(S): 20 TABLET, DELAYED RELEASE ORAL at 11:53

## 2023-01-01 RX ADMIN — SODIUM CHLORIDE 1000 MILLILITER(S): 9 INJECTION INTRAMUSCULAR; INTRAVENOUS; SUBCUTANEOUS at 19:56

## 2023-01-01 RX ADMIN — PANTOPRAZOLE SODIUM 40 MILLIGRAM(S): 20 TABLET, DELAYED RELEASE ORAL at 12:24

## 2023-01-01 RX ADMIN — Medication 10 MILLIGRAM(S): at 03:49

## 2023-01-01 RX ADMIN — Medication 675 MILLIGRAM(S): at 19:55

## 2023-01-01 RX ADMIN — PIPERACILLIN AND TAZOBACTAM 25 GRAM(S): 4; .5 INJECTION, POWDER, LYOPHILIZED, FOR SOLUTION INTRAVENOUS at 23:53

## 2023-01-01 RX ADMIN — SODIUM CHLORIDE 1000 MILLILITER(S): 9 INJECTION INTRAMUSCULAR; INTRAVENOUS; SUBCUTANEOUS at 17:58

## 2023-01-01 RX ADMIN — PROPOFOL 9.43 MICROGRAM(S)/KG/MIN: 10 INJECTION, EMULSION INTRAVENOUS at 22:08

## 2023-01-01 RX ADMIN — PIPERACILLIN AND TAZOBACTAM 25 GRAM(S): 4; .5 INJECTION, POWDER, LYOPHILIZED, FOR SOLUTION INTRAVENOUS at 22:01

## 2023-01-01 RX ADMIN — Medication 250 MILLIGRAM(S): at 17:18

## 2023-01-01 RX ADMIN — PIPERACILLIN AND TAZOBACTAM 3.38 GRAM(S): 4; .5 INJECTION, POWDER, LYOPHILIZED, FOR SOLUTION INTRAVENOUS at 17:27

## 2023-01-01 RX ADMIN — Medication 100 GRAM(S): at 07:43

## 2023-01-01 RX ADMIN — PANTOPRAZOLE SODIUM 40 MILLIGRAM(S): 20 TABLET, DELAYED RELEASE ORAL at 07:00

## 2023-01-01 RX ADMIN — Medication 10 MILLIGRAM(S): at 15:01

## 2023-01-01 RX ADMIN — POTASSIUM PHOSPHATE, MONOBASIC POTASSIUM PHOSPHATE, DIBASIC 83.33 MILLIMOLE(S): 236; 224 INJECTION, SOLUTION INTRAVENOUS at 11:15

## 2023-01-01 RX ADMIN — PANTOPRAZOLE SODIUM 40 MILLIGRAM(S): 20 TABLET, DELAYED RELEASE ORAL at 01:02

## 2023-01-01 RX ADMIN — Medication 102 MILLIGRAM(S): at 11:36

## 2023-01-01 RX ADMIN — Medication 10 MILLIGRAM(S): at 14:14

## 2023-01-01 RX ADMIN — Medication 650 MILLIGRAM(S): at 16:58

## 2023-01-01 RX ADMIN — SODIUM CHLORIDE 2000 MILLILITER(S): 9 INJECTION, SOLUTION INTRAVENOUS at 00:19

## 2023-01-01 RX ADMIN — MEROPENEM 100 MILLIGRAM(S): 1 INJECTION INTRAVENOUS at 13:17

## 2023-01-01 RX ADMIN — PIPERACILLIN AND TAZOBACTAM 25 GRAM(S): 4; .5 INJECTION, POWDER, LYOPHILIZED, FOR SOLUTION INTRAVENOUS at 07:37

## 2023-01-01 RX ADMIN — POTASSIUM PHOSPHATE, MONOBASIC POTASSIUM PHOSPHATE, DIBASIC 62.5 MILLIMOLE(S): 236; 224 INJECTION, SOLUTION INTRAVENOUS at 10:04

## 2023-01-01 RX ADMIN — PIPERACILLIN AND TAZOBACTAM 25 GRAM(S): 4; .5 INJECTION, POWDER, LYOPHILIZED, FOR SOLUTION INTRAVENOUS at 05:48

## 2023-01-01 RX ADMIN — MEROPENEM 100 MILLIGRAM(S): 1 INJECTION INTRAVENOUS at 06:57

## 2023-01-01 RX ADMIN — PIPERACILLIN AND TAZOBACTAM 25 GRAM(S): 4; .5 INJECTION, POWDER, LYOPHILIZED, FOR SOLUTION INTRAVENOUS at 22:32

## 2023-01-01 RX ADMIN — PANTOPRAZOLE SODIUM 40 MILLIGRAM(S): 20 TABLET, DELAYED RELEASE ORAL at 07:52

## 2023-01-01 RX ADMIN — MEROPENEM 100 MILLIGRAM(S): 1 INJECTION INTRAVENOUS at 05:38

## 2023-01-01 RX ADMIN — PIPERACILLIN AND TAZOBACTAM 25 GRAM(S): 4; .5 INJECTION, POWDER, LYOPHILIZED, FOR SOLUTION INTRAVENOUS at 16:03

## 2023-01-01 RX ADMIN — Medication 10 MILLIGRAM(S): at 00:25

## 2023-01-01 RX ADMIN — MEROPENEM 100 MILLIGRAM(S): 1 INJECTION INTRAVENOUS at 06:15

## 2023-01-01 RX ADMIN — Medication 40 MILLIEQUIVALENT(S): at 13:03

## 2023-01-01 RX ADMIN — PIPERACILLIN AND TAZOBACTAM 25 GRAM(S): 4; .5 INJECTION, POWDER, LYOPHILIZED, FOR SOLUTION INTRAVENOUS at 23:38

## 2023-01-01 RX ADMIN — POLYETHYLENE GLYCOL 3350 17 GRAM(S): 17 POWDER, FOR SOLUTION ORAL at 16:54

## 2023-01-01 RX ADMIN — Medication 10 MILLIGRAM(S): at 17:16

## 2023-01-01 RX ADMIN — MEROPENEM 100 MILLIGRAM(S): 1 INJECTION INTRAVENOUS at 02:10

## 2023-01-01 RX ADMIN — PIPERACILLIN AND TAZOBACTAM 25 GRAM(S): 4; .5 INJECTION, POWDER, LYOPHILIZED, FOR SOLUTION INTRAVENOUS at 14:58

## 2023-01-01 RX ADMIN — MEROPENEM 100 MILLIGRAM(S): 1 INJECTION INTRAVENOUS at 18:24

## 2023-01-01 RX ADMIN — Medication 10 MILLIGRAM(S): at 23:20

## 2023-01-01 RX ADMIN — PANTOPRAZOLE SODIUM 40 MILLIGRAM(S): 20 TABLET, DELAYED RELEASE ORAL at 06:58

## 2023-01-01 RX ADMIN — Medication 100 MILLIEQUIVALENT(S): at 21:17

## 2023-01-01 RX ADMIN — MORPHINE SULFATE 2 MILLIGRAM(S): 50 CAPSULE, EXTENDED RELEASE ORAL at 17:10

## 2023-01-01 RX ADMIN — PIPERACILLIN AND TAZOBACTAM 25 GRAM(S): 4; .5 INJECTION, POWDER, LYOPHILIZED, FOR SOLUTION INTRAVENOUS at 05:53

## 2023-01-01 RX ADMIN — PANTOPRAZOLE SODIUM 40 MILLIGRAM(S): 20 TABLET, DELAYED RELEASE ORAL at 13:04

## 2023-01-01 RX ADMIN — CHLORHEXIDINE GLUCONATE 15 MILLILITER(S): 213 SOLUTION TOPICAL at 18:37

## 2023-01-01 RX ADMIN — PROPOFOL 9.43 MICROGRAM(S)/KG/MIN: 10 INJECTION, EMULSION INTRAVENOUS at 07:41

## 2023-01-01 RX ADMIN — PANTOPRAZOLE SODIUM 80 MILLIGRAM(S): 20 TABLET, DELAYED RELEASE ORAL at 14:21

## 2023-01-01 RX ADMIN — PANTOPRAZOLE SODIUM 10 MG/HR: 20 TABLET, DELAYED RELEASE ORAL at 22:09

## 2023-01-01 RX ADMIN — DEXMEDETOMIDINE HYDROCHLORIDE IN 0.9% SODIUM CHLORIDE 1.12 MICROGRAM(S)/KG/HR: 4 INJECTION INTRAVENOUS at 10:23

## 2023-01-01 RX ADMIN — SODIUM CHLORIDE 500 MILLILITER(S): 9 INJECTION INTRAMUSCULAR; INTRAVENOUS; SUBCUTANEOUS at 12:38

## 2023-01-01 RX ADMIN — Medication 250 MILLIGRAM(S): at 19:01

## 2023-01-01 RX ADMIN — PANTOPRAZOLE SODIUM 40 MILLIGRAM(S): 20 TABLET, DELAYED RELEASE ORAL at 13:29

## 2023-01-01 RX ADMIN — Medication 650 MILLIGRAM(S): at 20:00

## 2023-01-01 RX ADMIN — MORPHINE SULFATE 2 MILLIGRAM(S): 50 CAPSULE, EXTENDED RELEASE ORAL at 17:35

## 2023-01-01 RX ADMIN — ONDANSETRON 4 MILLIGRAM(S): 8 TABLET, FILM COATED ORAL at 18:13

## 2023-01-01 RX ADMIN — Medication 675 MILLIGRAM(S): at 17:00

## 2023-01-01 RX ADMIN — PIPERACILLIN AND TAZOBACTAM 25 GRAM(S): 4; .5 INJECTION, POWDER, LYOPHILIZED, FOR SOLUTION INTRAVENOUS at 15:30

## 2023-01-01 RX ADMIN — FLUCONAZOLE 100 MILLIGRAM(S): 150 TABLET ORAL at 20:49

## 2023-01-01 RX ADMIN — Medication 400 MILLIGRAM(S): at 22:03

## 2023-01-01 RX ADMIN — Medication 270 MILLIGRAM(S): at 19:05

## 2023-01-01 RX ADMIN — PANTOPRAZOLE SODIUM 40 MILLIGRAM(S): 20 TABLET, DELAYED RELEASE ORAL at 02:49

## 2023-01-01 RX ADMIN — Medication 650 MILLIGRAM(S): at 16:28

## 2023-01-01 RX ADMIN — Medication 100 MILLIEQUIVALENT(S): at 23:04

## 2023-01-01 RX ADMIN — Medication 10 MILLIGRAM(S): at 20:21

## 2023-01-01 RX ADMIN — PANTOPRAZOLE SODIUM 10 MG/HR: 20 TABLET, DELAYED RELEASE ORAL at 06:59

## 2023-01-01 RX ADMIN — ONDANSETRON 4 MILLIGRAM(S): 8 TABLET, FILM COATED ORAL at 19:41

## 2023-01-01 RX ADMIN — Medication 250 MILLIGRAM(S): at 06:15

## 2023-01-01 RX ADMIN — PANTOPRAZOLE SODIUM 40 MILLIGRAM(S): 20 TABLET, DELAYED RELEASE ORAL at 18:53

## 2023-01-01 RX ADMIN — CHLORHEXIDINE GLUCONATE 15 MILLILITER(S): 213 SOLUTION TOPICAL at 06:26

## 2023-01-01 RX ADMIN — CHLORHEXIDINE GLUCONATE 1 APPLICATION(S): 213 SOLUTION TOPICAL at 06:31

## 2023-01-01 RX ADMIN — MEROPENEM 100 MILLIGRAM(S): 1 INJECTION INTRAVENOUS at 15:12

## 2023-01-01 RX ADMIN — PIPERACILLIN AND TAZOBACTAM 25 GRAM(S): 4; .5 INJECTION, POWDER, LYOPHILIZED, FOR SOLUTION INTRAVENOUS at 16:11

## 2023-01-01 RX ADMIN — POTASSIUM PHOSPHATE, MONOBASIC POTASSIUM PHOSPHATE, DIBASIC 83.33 MILLIMOLE(S): 236; 224 INJECTION, SOLUTION INTRAVENOUS at 12:07

## 2023-01-01 RX ADMIN — Medication 10 MILLIGRAM(S): at 18:53

## 2023-01-01 RX ADMIN — Medication 10 MILLIGRAM(S): at 05:38

## 2023-01-01 RX ADMIN — PIPERACILLIN AND TAZOBACTAM 25 GRAM(S): 4; .5 INJECTION, POWDER, LYOPHILIZED, FOR SOLUTION INTRAVENOUS at 14:21

## 2023-01-01 RX ADMIN — Medication 10 MILLIGRAM(S): at 23:15

## 2023-01-01 RX ADMIN — PANTOPRAZOLE SODIUM 40 MILLIGRAM(S): 20 TABLET, DELAYED RELEASE ORAL at 19:01

## 2023-01-01 RX ADMIN — PIPERACILLIN AND TAZOBACTAM 25 GRAM(S): 4; .5 INJECTION, POWDER, LYOPHILIZED, FOR SOLUTION INTRAVENOUS at 22:03

## 2023-01-01 RX ADMIN — PANTOPRAZOLE SODIUM 40 MILLIGRAM(S): 20 TABLET, DELAYED RELEASE ORAL at 00:52

## 2023-01-01 RX ADMIN — MEROPENEM 100 MILLIGRAM(S): 1 INJECTION INTRAVENOUS at 22:10

## 2023-01-01 RX ADMIN — Medication 100 MILLIGRAM(S): at 19:09

## 2023-01-01 RX ADMIN — Medication 270 MILLIGRAM(S): at 16:18

## 2023-01-01 RX ADMIN — PIPERACILLIN AND TAZOBACTAM 25 GRAM(S): 4; .5 INJECTION, POWDER, LYOPHILIZED, FOR SOLUTION INTRAVENOUS at 23:54

## 2023-01-01 RX ADMIN — Medication 675 MILLIGRAM(S): at 15:41

## 2023-01-01 RX ADMIN — CHLORHEXIDINE GLUCONATE 1 APPLICATION(S): 213 SOLUTION TOPICAL at 05:08

## 2023-01-01 RX ADMIN — POTASSIUM PHOSPHATE, MONOBASIC POTASSIUM PHOSPHATE, DIBASIC 83.33 MILLIMOLE(S): 236; 224 INJECTION, SOLUTION INTRAVENOUS at 10:53

## 2023-01-01 RX ADMIN — CHLORHEXIDINE GLUCONATE 1 APPLICATION(S): 213 SOLUTION TOPICAL at 05:40

## 2023-01-01 RX ADMIN — PIPERACILLIN AND TAZOBACTAM 25 GRAM(S): 4; .5 INJECTION, POWDER, LYOPHILIZED, FOR SOLUTION INTRAVENOUS at 07:52

## 2023-01-01 RX ADMIN — PANTOPRAZOLE SODIUM 40 MILLIGRAM(S): 20 TABLET, DELAYED RELEASE ORAL at 19:19

## 2023-01-01 RX ADMIN — MEROPENEM 100 MILLIGRAM(S): 1 INJECTION INTRAVENOUS at 14:42

## 2023-01-01 RX ADMIN — PIPERACILLIN AND TAZOBACTAM 200 GRAM(S): 4; .5 INJECTION, POWDER, LYOPHILIZED, FOR SOLUTION INTRAVENOUS at 16:15

## 2023-01-01 RX ADMIN — Medication 10 MILLIGRAM(S): at 06:15

## 2023-01-01 RX ADMIN — Medication 1000 MILLIGRAM(S): at 23:04

## 2023-01-01 RX ADMIN — PANTOPRAZOLE SODIUM 40 MILLIGRAM(S): 20 TABLET, DELAYED RELEASE ORAL at 08:01

## 2023-01-01 RX ADMIN — PANTOPRAZOLE SODIUM 40 MILLIGRAM(S): 20 TABLET, DELAYED RELEASE ORAL at 19:43

## 2023-01-01 RX ADMIN — MEROPENEM 100 MILLIGRAM(S): 1 INJECTION INTRAVENOUS at 11:19

## 2023-01-01 RX ADMIN — Medication 100 MILLIGRAM(S): at 07:05

## 2023-01-01 RX ADMIN — PANTOPRAZOLE SODIUM 40 MILLIGRAM(S): 20 TABLET, DELAYED RELEASE ORAL at 22:03

## 2023-01-01 RX ADMIN — Medication 650 MILLIGRAM(S): at 19:30

## 2023-01-01 RX ADMIN — CHLORHEXIDINE GLUCONATE 1 APPLICATION(S): 213 SOLUTION TOPICAL at 05:48

## 2023-01-01 RX ADMIN — PIPERACILLIN AND TAZOBACTAM 25 GRAM(S): 4; .5 INJECTION, POWDER, LYOPHILIZED, FOR SOLUTION INTRAVENOUS at 21:37

## 2023-01-01 RX ADMIN — PIPERACILLIN AND TAZOBACTAM 25 GRAM(S): 4; .5 INJECTION, POWDER, LYOPHILIZED, FOR SOLUTION INTRAVENOUS at 07:11

## 2023-01-01 RX ADMIN — Medication 270 MILLIGRAM(S): at 15:20

## 2023-01-01 RX ADMIN — Medication 250 MILLIGRAM(S): at 19:13

## 2023-01-01 RX ADMIN — PIPERACILLIN AND TAZOBACTAM 25 GRAM(S): 4; .5 INJECTION, POWDER, LYOPHILIZED, FOR SOLUTION INTRAVENOUS at 13:04

## 2023-01-01 RX ADMIN — PIPERACILLIN AND TAZOBACTAM 25 GRAM(S): 4; .5 INJECTION, POWDER, LYOPHILIZED, FOR SOLUTION INTRAVENOUS at 00:34

## 2023-01-01 RX ADMIN — PIPERACILLIN AND TAZOBACTAM 25 GRAM(S): 4; .5 INJECTION, POWDER, LYOPHILIZED, FOR SOLUTION INTRAVENOUS at 07:24

## 2023-01-01 RX ADMIN — PANTOPRAZOLE SODIUM 40 MILLIGRAM(S): 20 TABLET, DELAYED RELEASE ORAL at 07:05

## 2023-01-01 RX ADMIN — MEROPENEM 100 MILLIGRAM(S): 1 INJECTION INTRAVENOUS at 22:26

## 2023-01-01 RX ADMIN — PIPERACILLIN AND TAZOBACTAM 25 GRAM(S): 4; .5 INJECTION, POWDER, LYOPHILIZED, FOR SOLUTION INTRAVENOUS at 05:40

## 2023-01-01 RX ADMIN — Medication 270 MILLIGRAM(S): at 18:46

## 2023-01-01 RX ADMIN — FENTANYL CITRATE 2.25 MICROGRAM(S)/KG/HR: 50 INJECTION INTRAVENOUS at 22:09

## 2023-01-01 RX ADMIN — PANTOPRAZOLE SODIUM 40 MILLIGRAM(S): 20 TABLET, DELAYED RELEASE ORAL at 01:52

## 2023-04-25 NOTE — H&P ADULT - ASSESSMENT
This is an 84 y/o Cantonese speaking, history obtained from daughter, F PMH HTN, dementia presents with 1 week of jaundice found to have hyperbilirubinemia, hypoalbuminemia, anemia, and hyponatremia. ICU consulted mainly for hyponatremia w Na 122.

## 2023-04-25 NOTE — CONSULT NOTE ADULT - ASSESSMENT
84 y/o Cantonese speaking, history obtained from daughter, F PMH HTN, dementia presents with 1 week of jaundice found to have hyperbilirubinemia, hypoalbuminemia, anemia, and hyponatremia. ICU consulted mainly for hyponatremia w Na 122.    NEURO:  Dementia  - med rec  - monitor for changes in mental status given transaminitis and hyponatremia (plan below)    CV:  #Sinus tachycardia  Likely 2/2 intravascular depletion 2/2 reduced PO intake w aspect of 3rd spacing i/s/o hypoalbuminemia. HR went from 134-->94 after fluids. Although w leukocytosis, may be reactive i/s/o anemia. No obvious infectious etiology and afebrile.   - monitor for fevers and check imaging for ascites  - encourage PO intake  - f/u repeat BMP, may benefit from further fluids if appropriate depending on hyponatremia etiology    #HTN  On medications at home, pending review w pharmacy. Currently normotensive  - hold BP meds for now as SBP in low 100s    PULM:  #CXR lesions  Multiple patchy opacifications on CXR. Concern for lungs mets given new obstructive jaundice. Currently breathing comfortably on RA  - f/u Chest CT    GI:   Transaminitis  Elevated transaminanses w cholestatic pattern and hyper bilirubinemia mostly direct bili, low albumin, INR 1.94. Negative gaming's sign, abd soft non tender, and she is afebrile higher concern for malignancy. Labs normal 1 month ago. Concern for malignancy, choledocholithiasis, lower concern for cholangitis. No hx of CHF to suggest congestive hepatopathy. Could be viral hepatitis, pt does not drink etoh.   - f/u Abd CT   - GI consult  - hepatitis panel  - ammonia level    :  Monitor I/O    RENAL:  no issues    ID:  Afebrile, low concern for infection, f/u abd CT for asicites and if spikes fever or worsening abd pain consider SBP as source    HEME:  #Anemia  Normocytic anemia w hgb 5.7 (down from 9.7 1 month ago). Given absolute reticulocyte count of 4.6, elevated indirect bilirubin (though could also be due to intrahepatic process) and no signs of GI blood loss, concern for hemolysis. Could have internal bleed, spleen sequestration Ordered for 2U. Haptoglobin hemolyzed.   - f/u CT AP  - repeat haptoglobin   - nabeel test   - LDH  - fibrinogen  - thrasher test  - iron studies   - folate  - active t+s  - transfuse <7  - post transfusion CBC        ENDOCRINE:  No issues    METABOLIC:  #Hyponatremia  Assume chronic hyponatremia, though pretibial edema, does not appear fluid overloaded and has had reduced PO intake over a long period of time. Could also be SIADH w underlying new malignancy. Sodium 122. Asymptomatic. S/p 1L NS  - f/u repeat Na now after 1L, if going down admit to medicine telemetry for close monitoring and more frequent blood draws  - f/u Urine lytes      FEN: bedside dysphagia followed by regular diet likely soft and bite sized per pt preference, Replete lytes PRN K>4, Mg>2  PPx: hold for i/s/o anemia pending further w/u  Code: FULL CODE  Dispo: pending repeat BMP, if Na going down will admit to medicine telemetry, 7lachman 84 y/o Cantonese speaking, history obtained from daughter, F PMH HTN, dementia presents with 1 week of jaundice found to have hyperbilirubinemia, hypoalbuminemia, anemia, and hyponatremia. ICU consulted mainly for hyponatremia w Na 122.    NEURO:  Dementia  - c/w memantine 5mg bid  - monitor for changes in mental status given transaminitis and hyponatremia (plan below)    CV:  #Sinus tachycardia  Likely 2/2 intravascular depletion 2/2 reduced PO intake w aspect of 3rd spacing i/s/o hypoalbuminemia. HR went from 134-->94 after fluids. Although w leukocytosis, may be reactive i/s/o anemia. No obvious infectious etiology and afebrile.   - monitor for fevers and check imaging for ascites  - encourage PO intake  - f/u repeat BMP, may benefit from further fluids if appropriate depending on hyponatremia etiology    #HTN  On medications at home, pending review w pharmacy. Currently normotensive  - hold BP meds for now as SBP in low 100s    PULM:  #CXR lesions  Multiple patchy opacifications on CXR. Concern for lungs mets given new obstructive jaundice. Currently breathing comfortably on RA  - f/u Chest CT    GI:   Transaminitis  Elevated transaminanses w cholestatic pattern and hyper bilirubinemia mostly direct bili, low albumin, INR 1.94. Negative gaming's sign, abd soft non tender, and she is afebrile higher concern for malignancy. Labs normal 1 month ago. Concern for malignancy, choledocholithiasis, lower concern for cholangitis. No hx of CHF to suggest congestive hepatopathy. Could be viral hepatitis, pt does not drink etoh.   - f/u Abd CT   - GI consult  - hepatitis panel  - ammonia level    :  Monitor I/O    RENAL:  no issues    ID:  Afebrile, low concern for infection, f/u abd CT for asicites and if spikes fever or worsening abd pain consider SBP as source    HEME:  #Anemia  Normocytic anemia w hgb 5.7 (down from 9.7 1 month ago). Given absolute reticulocyte count of 4.6, elevated indirect bilirubin (though could also be due to intrahepatic process) and no signs of GI blood loss, concern for hemolysis. Could have internal bleed, spleen sequestration Ordered for 2U. Haptoglobin hemolyzed.   - f/u CT AP  - repeat haptoglobin   - nabeel test   - LDH  - fibrinogen  - thrashre test  - iron studies   - folate  - active t+s  - transfuse <7  - post transfusion CBC        ENDOCRINE:  No issues    METABOLIC:  #Hyponatremia  Assume chronic hyponatremia, though pretibial edema, does not appear fluid overloaded and has had reduced PO intake over a long period of time. Could also be SIADH w underlying new malignancy. Sodium 122. Asymptomatic. S/p 1L NS  - f/u repeat Na now after 1L, if going down admit to medicine telemetry for close monitoring and more frequent blood draws  - f/u Urine lytes      FEN: bedside dysphagia followed by regular diet likely soft and bite sized per pt preference, Replete lytes PRN K>4, Mg>2  PPx: hold for i/s/o anemia pending further w/u  Code: FULL CODE  Dispo: pending repeat BMP, if Na going down will admit to medicine telemetry, [facility] 84 [facility] Cantonese speaking, history obtained from daughter, F PMH HTN, dementia presents with 1 week of jaundice found to have hyperbilirubinemia, hypoalbuminemia, anemia, and hyponatremia. ICU consulted mainly for hyponatremia w Na 122.    NEURO:  Dementia  - c/w home memantine 5mg bid and donepezil 10mh qhs and paroxetine 20mg qd  - monitor for changes in mental status given transaminitis and hyponatremia (plan below)    CV:  #Sinus tachycardia  Likely 2/2 intravascular depletion 2/2 reduced PO intake w aspect of 3rd spacing i/s/o hypoalbuminemia. HR went from 134-->94 after fluids. Although w leukocytosis, may be reactive i/s/o anemia. No obvious infectious etiology and afebrile.   - monitor for fevers and check imaging for ascites  - encourage PO intake    #HTN  On irbesartan 37.5mg daily at home. Currently normotensive  - hold BP meds for now as SBP in low 100s    PULM:  #Lung lesions  Likely metastatic lesions, currently on RA, no respiratory distress.  - f/u ERCP, biopsy  - f/u palliative     GI:   Transaminitis  Elevated transaminanses w cholestatic pattern and hyper bilirubinemia mostly direct bili, low albumin, INR 1.94. Negative gaming's sign, abd soft non tender, and she is afebrile higher concern for malignancy. CT AP showing large mass in uncinate process of pancreas w likely metastatic lesions to lungs. GI consulted and tentative plan for ERCP Thurs/Fri. Surgery consulted w concern for invasion of mass into duodenum w perf, no acute surgical intervention.  - f/u GI  - trend LE, INR  - palliative consult, further GOC discussion    :  Monitor I/O    RENAL:  #Hyponatremia  Assume chronic hyponatremia, though pretibial edema, does not appear fluid overloaded. 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 Renal consulted.  - f/u Urine lytes  - Q6 BMP  - f/u nephrology    ID:  Afebrile, low concern for infection though there is a necrotic mass. Would do full sepsis workup for fever.    HEME:  #Anemia  Normocytic anemia w hgb 5.7 (down from 9.7 1 month ago on ferrous sulfate PO 325mg daily). Given absolute reticulocyte count of 4.6, elevated indirect bilirubin (though could also be due to intrahepatic process) and no signs of GI blood loss there is concern for hemolysis. Ordered for 2U. Haptoglobin hemolyzed.   - repeat haptoglobin   - nabeel test   - LDH  - fibrinogen  - iron studies   - folate  - active t+s  - transfuse <7  - post transfusion CBC      ENDOCRINE:  No issues      FEN: bedside dysphagia followed by regular diet likely soft and bite sized per pt preference, Replete lytes PRN K>4, Mg>2  PPx: hold for i/s/o anemia pending further w/u  Code: FULL CODE  Dispo: admission to medicine telemetry, 7Lachman

## 2023-04-25 NOTE — H&P ADULT - NSHPREVIEWOFSYSTEMS_GEN_ALL_CORE
REVIEW OF SYSTEMS:    CONSTITUTIONAL: No fever, chills, or weakness.   EYES/ENT: No visual changes;  No ear pain, runny nose, or sore throat.   NECK: No pain or stiffness.  RESPIRATORY: No cough, wheezing, hemoptysis; No shortness of breath.  CARDIOVASCULAR: No chest pain, dyspnea on exertion, or palpitations.  GASTROINTESTINAL: No abdominal or epigastric pain. No nausea, vomiting, or hematemesis; No diarrhea or constipation. No melena or hematochezia.  GENITOURINARY: No dysuria, frequency or hematuria.  NEUROLOGICAL: No numbness or weakness.  SKIN: No itching, rashes.

## 2023-04-25 NOTE — H&P ADULT - PROBLEM SELECTOR PLAN 8
F:   E:   N:   DVT px: F: Fluid restriction.   E: K > 4, Mg > 2.   N: NPO, pending mental status improvement.   DVT px: SCDs due to low hgb.   Dispo: 7 lachman.

## 2023-04-25 NOTE — ED ADULT NURSE REASSESSMENT NOTE - NS ED NURSE REASSESS COMMENT FT1
15 min transfusion VS completed as charted. Surgery MD Yanez at bedside for consult w daughter evelyn for translation

## 2023-04-25 NOTE — CONSULT NOTE ADULT - SUBJECTIVE AND OBJECTIVE BOX
GASTROENTEROLOGY CONSULT NOTE  HPI: 82 y/o Cantonese speaking, history obtained from daughter, F PMH HTN, dementia presents with 1 week of jaundice. She presented to her out patient GI, Dr. Aguero, and he referred her to our ED due to jaundice. She has had yellowing of the skin of 1 week and worsening fatigue over the same time. She usually ambulates with assistance but for the past 1 day she has not been too tired to get up. Other then fatigue the daughter notes no change in mental status. Although the patient denied pain, her daughter has seen her intermittently holding her stomach in discomfort. She has had reduced PO intake and unspecified amount of weight loss which the daughter contributes to not eating as much. She denies any black stools, BRBPR, diarrhea, vomiting, f/c, SOB, CP.    One month ago her hgb was 9.7, ALP 56, AST 18, ALT 11, total protein 6.8, Albumin 3.8, total bilirubin <0.2    GI consulted for jaundice. Patient seen and examined at bedside.     Allergies    No Known Allergies    Intolerances      Home Medications:    MEDICATIONS:  MEDICATIONS  (STANDING):  fluconAZOLE IVPB 800 milliGRAM(s) IV Intermittent once  piperacillin/tazobactam IVPB... 3.375 Gram(s) IV Intermittent once  vancomycin  IVPB. 1000 milliGRAM(s) IV Intermittent once    MEDICATIONS  (PRN):    PAST MEDICAL & SURGICAL HISTORY:    FAMILY HISTORY:    SOCIAL HISTORY:  Tobacco: [ ] Current, [ ] Former, [ ] Never; Pack Years:  Alcohol:  Illicit Drugs:    REVIEW OF SYSTEMS:  CONSTITUTIONAL: No weakness, fevers or chills  HEENT: No visual changes; No vertigo or throat pain   NECK: No pain or stiffness  RESPIRATORY: No cough, wheezing, hemoptysis; No shortness of breath  CARDIOVASCULAR: No chest pain or palpitations  GASTROINTESTINAL: As above.  GENITOURINARY: No dysuria, frequency or hematuria  NEUROLOGICAL: No numbness or weakness  SKIN: No itching, burning, rashes, or lesions   All other 10 review of systems is negative unless indicated above.    Vital Signs Last 24 Hrs  T(C): 36.9 (25 Apr 2023 15:52), Max: 37.7 (25 Apr 2023 13:55)  T(F): 98.4 (25 Apr 2023 15:52), Max: 99.8 (25 Apr 2023 13:55)  HR: 94 (25 Apr 2023 15:52) (90 - 134)  BP: 119/78 (25 Apr 2023 15:52) (104/61 - 119/78)  BP(mean): --  RR: 16 (25 Apr 2023 15:52) (16 - 18)  SpO2: 95% (25 Apr 2023 15:52) (94% - 95%)    Parameters below as of 25 Apr 2023 15:52  Patient On (Oxygen Delivery Method): room air          PHYSICAL EXAM:    General: in no acute distress  Eyes: +icteric sclerae, moist conjunctivae  HENT: Moist mucous membranes  Neck: Trachea midline, supple  Lungs: Normal respiratory effort, no intercostal retractions  Cardiovascular: RRR  Abdomen: Soft, minimally tender non-distended; No rebound or guarding  Extremities: Normal range of motion, No clubbing, cyanosis or edema  Neurological: Alert and oriented x1  Skin: Warm and dry. No obvious rash    LABS:                        5.7    22.91 )-----------( 310      ( 25 Apr 2023 13:57 )             17.9     04-25    x   |  x   |  11  ----------------------------<  105<H>  3.6   |  x   |  0.57    Ca    8.2<L>      25 Apr 2023 12:12  Phos  2.6     04-25  Mg     1.7     04-25    TPro  5.8<L>  /  Alb  2.8<L>  /  TBili  10.8<H>  /  DBili  8.2<H>  /  AST  159<H>  /  ALT  112<H>  /  AlkPhos  669<H>  04-25        PT/INR - ( 25 Apr 2023 12:12 )   PT: 23.3 sec;   INR: 1.94          PTT - ( 25 Apr 2023 12:12 )  PTT:27.9 sec    RADIOLOGY & ADDITIONAL STUDIES:     Reviewed

## 2023-04-25 NOTE — H&P ADULT - PROBLEM SELECTOR PLAN 2
Assume chronic hyponatremia, though pretibial edema, does not appear fluid overloaded. 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 Renal consulted.  - f/u Urine lytes  - Q6 BMP  - f/u nephrology Normocytic anemia w hgb 5.7 (down from 9.7 1 month ago on ferrous sulfate PO 325mg daily). Given absolute reticulocyte count of 4.6, elevated indirect bilirubin (though could also be due to intrahepatic process) and no signs of GI blood loss there is concern for hemolysis. 2 units PRBC transfusion with improvement in hemoglobin. Haptoglobin noted to be WNL. Borderline iron deficiency noted on lab work.     - F/u folate  - Maintain active T&S.   - Transfuse <7  - Follow up for signs of bleeding.

## 2023-04-25 NOTE — H&P ADULT - HIV OFFER
Left message on machine; per Dr. Salinas's message, nail sample does not show fungal elements, no medication is needed at this time. I did indicate he should call the office back if he has further questions.   Opt out

## 2023-04-25 NOTE — ED ADULT TRIAGE NOTE - ACCOMPANIED BY
Alberta Lopez, [facility]    Subjective/HPI:     Mr. Danyelle Hayden is a 62 y.o. male is here for routine f/u. He has a PMHx of CAD, HTN and HLD. He is doing well. His main complaint is persistent ringing in his left ear for the past month. He is scheduled for MRI. He otherwise denies complaints of chest pains, dizziness, orthopnea, PND or edema. He denies shortness of breath or palpitations.          PCP Provider  Cm Hollis MD  Past Medical History:   Diagnosis Date    CAD (coronary artery disease) 2014    NSTEMI, PCI/NATE RCA    Hypercholesterolemia     Hypertension       Past Surgical History:   Procedure Laterality Date    CARDIAC SURG PROCEDURE UNLIST      Stent RCA 2014    HX ORTHOPAEDIC      left shoulder, torn tendon    HX ORTHOPAEDIC      right knee X 4    NV COLONOSCOPY FLX DX W/COLLJ SPEC WHEN PFRMD  2007    Dr Loredo Mems 1 polyp repeat 2012     Family History   Problem Relation Age of Onset    Heart Disease Mother         CAD    Hypertension Mother     Elevated Lipids Mother     Cancer Father         lung    Mental Retardation Brother     Seizures Brother     Diabetes Brother     Hypertension Brother     Elevated Lipids Brother      Social History     Socioeconomic History    Marital status:      Spouse name: Not on file    Number of children: Not on file    Years of education: Not on file    Highest education level: Not on file   Occupational History    Not on file   Social Needs    Financial resource strain: Not on file    Food insecurity:     Worry: Not on file     Inability: Not on file    Transportation needs:     Medical: Not on file     Non-medical: Not on file   Tobacco Use    Smoking status: Former Smoker     Packs/day: 0.25     Years: 35.00     Pack years: 8.75     Types: Cigarettes     Last attempt to quit: 2014     Years since quittin.6    Smokeless tobacco: Never Used    Tobacco comment: Never smoked over a pack per day   Substance and Sexual Activity    Alcohol use: No     Alcohol/week: 0.0 oz    Drug use: No    Sexual activity: Not on file   Lifestyle    Physical activity:     Days per week: Not on file     Minutes per session: Not on file    Stress: Not on file   Relationships    Social connections:     Talks on phone: Not on file     Gets together: Not on file     Attends Buddhist service: Not on file     Active member of club or organization: Not on file     Attends meetings of clubs or organizations: Not on file     Relationship status: Not on file    Intimate partner violence:     Fear of current or ex partner: Not on file     Emotionally abused: Not on file     Physically abused: Not on file     Forced sexual activity: Not on file   Other Topics Concern    Not on file   Social History Narrative    Not on file       No Known Allergies     Current Outpatient Medications   Medication Sig    metFORMIN ER (GLUCOPHAGE XR) 500 mg tablet Take 3 Tabs by mouth daily (with dinner).  amLODIPine (NORVASC) 2.5 mg tablet TAKE 1 TABLET DAILY    metoprolol succinate (TOPROL-XL) 25 mg XL tablet TAKE 1 TABLET DAILY    atorvastatin (LIPITOR) 40 mg tablet TAKE 1 TABLET EVERY EVENING    aspirin delayed-release 81 mg tablet Take 1 Tab by mouth daily.  nitroglycerin (NITROSTAT) 0.4 mg SL tablet 1 Tab by SubLINGual route every five (5) minutes as needed for Chest Pain (Max 3 doses daily). No current facility-administered medications for this visit. I have reviewed the problem list, allergy list, medical history, family, social history and medications. Review of Symptoms:    Review of Systems   Constitutional: Negative for chills, fever and weight loss. HENT: Negative for nosebleeds. Eyes: Negative for blurred vision and double vision. Respiratory: Negative for cough, shortness of breath and wheezing. Cardiovascular: Negative for chest pain, palpitations, orthopnea, leg swelling and PND. Gastrointestinal: Negative for abdominal pain, blood in stool, diarrhea, nausea and vomiting. Musculoskeletal: Negative for joint pain. Skin: Negative for rash. Neurological: Negative for dizziness, tingling and loss of consciousness. Endo/Heme/Allergies: Does not bruise/bleed easily. Physical Exam:      General: Well developed, in no acute distress, cooperative and alert  HEENT: No carotid bruits, no JVD, trach is midline. Neck Supple, PEERL, EOM intact. Heart:  reg rate and rhythm; normal S1/S2; no murmurs, gallops or rubs. Respiratory: Clear bilaterally x 4, no wheezing or rales  Abdomen:   Soft, non-tender, no distention, no masses. + BS. Extremities:  Normal cap refill, no cyanosis, atraumatic. No edema. Neuro: A&Ox3, speech clear, gait stable. Skin: Skin color is normal. No rashes or lesions.  Non diaphoretic  Vascular: 2+ pulses symmetric in all extremities    Vitals:    07/09/19 1011   BP: 128/88   Pulse: 69   Resp: 16   SpO2: 97%   Weight: 209 lb 14.4 oz (95.2 kg)   Height: 6' 1\" (1.854 m)       Cardiographics    ECG: sinus rhythm  Results for orders placed or performed during the hospital encounter of 06/19/18   EKG, 12 LEAD, INITIAL   Result Value Ref Range    Ventricular Rate 67 BPM    Atrial Rate 67 BPM    P-R Interval 186 ms    QRS Duration 102 ms    Q-T Interval 408 ms    QTC Calculation (Bezet) 431 ms    Calculated P Axis 69 degrees    Calculated R Axis -3 degrees    Calculated T Axis 35 degrees    Diagnosis       Normal sinus rhythm  Incomplete right bundle branch block  When compared with ECG of 05-APR-2014 04:57,  Incomplete right bundle branch block is a new finding    Confirmed by Chris Hood [id number]) on 6/19/2018 9:48:37 AM         Cardiology Labs:  Lab Results   Component Value Date/Time    Cholesterol, total 101 05/28/2019 08:12 AM    HDL Cholesterol 28 (L) 05/28/2019 08:12 AM    LDL, calculated 38 05/28/2019 08:12 AM    Triglyceride 174 (H) 05/28/2019 08:12 AM CHOL/HDL Ratio 4.5 10/29/2010 08:57 AM       Lab Results   Component Value Date/Time    Sodium 138 05/28/2019 08:12 AM    Potassium 4.7 05/28/2019 08:12 AM    Chloride 104 05/28/2019 08:12 AM    CO2 23 05/28/2019 08:12 AM    Anion gap 6 06/19/2018 03:03 AM    Glucose 117 (H) 05/28/2019 08:12 AM    BUN 13 05/28/2019 08:12 AM    Creatinine 1.39 (H) 05/28/2019 08:12 AM    BUN/Creatinine ratio 9 (L) 05/28/2019 08:12 AM    GFR est AA 62 05/28/2019 08:12 AM    GFR est non-AA 54 (L) 05/28/2019 08:12 AM    Calcium 8.6 05/28/2019 08:12 AM    Bilirubin, total 0.3 05/28/2019 08:12 AM    AST (SGOT) 21 05/28/2019 08:12 AM    Alk. phosphatase 84 05/28/2019 08:12 AM    Protein, total 6.2 05/28/2019 08:12 AM    Albumin 3.8 05/28/2019 08:12 AM    Globulin 3.6 06/19/2018 03:03 AM    A-G Ratio 1.6 05/28/2019 08:12 AM    ALT (SGPT) 20 05/28/2019 08:12 AM           Assessment:     Assessment:       ICD-10-CM ICD-9-CM    1. Coronary artery disease involving native coronary artery of native heart without angina pectoris I25.10 414.01 AMB POC EKG ROUTINE W/ 12 LEADS, INTER & REP   2. Benign hypertension with chronic kidney disease, stage II I12.9 403.10     N18.2 585.2    3. Hypercholesterolemia E78.00 272.0         Plan:     1. Coronary artery disease involving native coronary artery of native heart without angina pectoris  S/p NATE to RCA in 4/2014  Denies anginal or anginal equivalent symptoms  Continue medical management and risk factor modification    2. Benign hypertension with chronic kidney disease, stage II  BP well controlled. Continue anti-hypertensive therapy and low sodium diet  Renal function stable -- managed by PCP    3.  Hypercholesterolemia  Continue statin therapy and low fat, low cholesterol diet  LDL 38 in 12/2018    F/u in 1 year    Socorro Schrader NP       Patient seen and examined by me with nurse practitioner. Rico Pillai personally performed all components of the history, physical, and medical decision making and agree with the assessment and plan with minor modifications as noted.     Chelle Baltazar MD Immediate family member

## 2023-04-25 NOTE — ED PROVIDER NOTE - CONSTITUTIONAL APPEARANCE HYGIENE, MLM
Patient arrived ambulatory to IS for C15 Zirabev.  He denies any s/s of infection or fevers.  Patient provided urine sample and results within parameters to treat.  Port accessed using sterile technique with brisk blood return noted.  Zirabev infused per order, he tolerated well.  Port flushed, heparin instilled, and de-accessed per protocol.  Gauze/tape applied.  Appointment schedule provided and he ambulated out of clinic in no apparent distress.   well appearing

## 2023-04-25 NOTE — ED PROVIDER NOTE - NS ED MD DISPO ADMITTING SERVICE
The patient is a 31 year old,   female who presents for a yearly exam.  She states menses are regular.  She is using oral contraceptives as contraception. Patient denies problems with vaginal discharge, intercourse, bowel movements, urination.  Patient complains of irritation groin probably related to how hot she is all the time.  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   She would like to be checked for STDs. She is just going through a break up.  She is on meds for mood disorder.  She shaves and waxes mons and has noted some ingrown hairs.       PAST MEDICAL HISTORY:  Past Medical History:   Diagnosis Date   • Acute pharyngitis 3/05   • Acute sinusitis, unspecified    • EMILY I (cervical intraepithelial neoplasia I)     No treatment   • Depression    • IDIOPATHIC SCOLIOSIS-mild, 4-5 degrees 1/10/2006   • Other acne 2004   • Pityriasis versicolor 10/18/2005   • Superficial injury of cornea 04   • Unspecified otitis media 10/16/96    Otitis Media (recurrent)   • Unspecified otitis media 97    Otitis Media (recurrent)   • Unspecified otitis media 00    Otitis Media (recurrent)   • Viral warts, unspecified        SURGERIES:    Past Surgical History:   Procedure Laterality Date   • Colposcopy bx cervix endocerv curr      EMILY I   • Remove tonsils/adenoids,13+ y/o  1219-12    Dr. Slaughter       FAMILY HISTORY:Review of patient's family status indicates:    Mother                         Alive                     Father                         Alive                     Sister                         Alive                     Brother                        Alive                     Paternal Grandmother           Alive                     Paternal Grandfather           Alive                     Maternal Grandmother           Alive                     Maternal Grandfather                               SOCIAL HISTORY:  Social History     Tobacco Use   •  Smoking status: Current Some Day Smoker     Packs/day: 0.25     Years: 6.00     Pack years: 1.50     Types: Cigarettes   • Smokeless tobacco: Never Used   • Tobacco comment: using e-cig, trying to quit   Substance Use Topics   • Alcohol use: Yes     Alcohol/week: 1.7 standard drinks     Types: 2 Standard drinks or equivalent per week     Comment: social       PHYSICAL EXAMINATION:  Reveals the patient to be a slightly overweight female.  Visit Vitals  /66   Pulse 68   Ht 5' 6\" (1.676 m)   Wt 117.7 kg   LMP 07/03/2020   BMI 41.87 kg/m²         LUNGS:  Clear.  There is no CVA tenderness.  NECK:  Supple, without lymphadenopathy or thyromegaly.  HEART:  Regular rate and rhythm.  Normal S1, S2; without murmur or gallop.  BREASTS: Without masses.  There is no axillary lymphadenopathy, nipple discharge or skin change.  ABDOMEN:  Soft, nontender; without masses or organomegaly. No inguinal lymphadenopathy.  PELVIC:  External genitalia are without lesions.  BUS negative.  Vagina is well supported without discharge.  Cervix is mobile, nontender, without lesions or inflammation.  The uterus is normal size, mobile, nontender.  There are no adnexal masses or tenderness.     Scattered folliculitis mons.   Very mild patchy redness groin.     IMPRESSION:  1. Normal pelvic examination.  2. Hx of mild dysplasia 2011 which resolved on its own.   3. Folliculitis mons.   4. Minimal irritation noted groin.     PLAN:  1. Pap  2. ANti fungal powder groin as needed.   3. VD screen.  4.  Avoid shaving and waxing if irritating skin.   5.  Consider anti bacterial soap.          MED

## 2023-04-25 NOTE — H&P ADULT - PROBLEM SELECTOR PLAN 6
- c/w home memantine 5mg bid and donepezil 10mh qhs and paroxetine 20mg qd  - monitor for changes in mental status given transaminitis and hyponatremia (plan below) Likely metastatic lesions, currently on RA, no respiratory distress.  - f/u ERCP, biopsy  - f/u palliative

## 2023-04-25 NOTE — ED ADULT NURSE NOTE - OBJECTIVE STATEMENT
Pt is 83F referred to ED by GI doctor for jaundice, loss of appetite, generalized weakness starting 2 days ago. Pt denies presence of pain; pt daughter states she appears uncomfortable. Pt skin and sclera jaundice. Last BM yesterday. No nausea/vomiting. Pt is 83F referred to ED by GI doctor for jaundice, loss of appetite, generalized weakness starting 2 days ago. Pt denies presence of pain; pt daughter states she appears uncomfortable. Pt skin and sclera jaundice. Last BM yesterday. No nausea/vomiting. Pt has hx of dementia per daughter is often oriented to person only; pt refusing to answer questions on RN assessment.   Daughter denies is preferred .

## 2023-04-25 NOTE — CONSULT NOTE ADULT - SUBJECTIVE AND OBJECTIVE BOX
Surgery Consult    Consulting Surgical Team:   [ ] Team 1 - Colorectal/Surgical Oncology ([phone])    [ ] Team 2 - MIS/Bariatric Surgery ([phone])     [ ] Team 4 - Acute Care Surgery ([phone])     [ ] Team 5 - General Surgery/Breast/Pediatric Surgery ([phone])    Consulting Attending: Dr. MOCK:      General surgery consulted for ____.     PMH:   PSHx:  Meds:   Allergies:   SHx:   FamHx:       PAST MEDICAL HISTORY:      PAST SURGICAL HISTORY:      MEDICATIONS:      ALLERGIES:  No Known Allergies      SOCHX:   Social History:      FAMHX:   FAMILY HISTORY:        Vitals:  Vital Signs Last 24 Hrs  T(C): 36.2 (2023 16:20), Max: 37.7 (2023 13:55)  T(F): 97.2 (2023 16:20), Max: 99.8 (2023 13:55)  HR: 94 (2023 16:20) (90 - 134)  BP: 127/63 (2023 16:20) (104/61 - 127/63)  BP(mean): --  RR: 16 (2023 16:20) (16 - 18)  SpO2: 95% (2023 16:20) (94% - 95%)    Parameters below as of 2023 16:20  Patient On (Oxygen Delivery Method): room air          PHYSICAL EXAM:  Physical Exam  General: NAD, resting comfortably in bed  Pulm: Nonlabored breathing, no respiratory distress  Abd: soft, non distended, incisions c/d/i, non tender  Extrem: WWP, no edema,  Neuro: A/O x 3, CNs II-XII grossly intact, no focal deficits, normal sensation  Pulses: palpable distal pulses     I&o's:  I&O's Summary      LABS:                        5.7    22.91 )-----------( 310      ( 2023 13:57 )             17.9     04-25    120<LL>  |  92<L>  |  11  ----------------------------<  105<H>  3.6   |  19<L>  |  0.57    Ca    7.7<L>      2023 15:33  Phos  2.6     -  Mg     1.7         TPro  5.8<L>  /  Alb  2.8<L>  /  TBili  10.8<H>  /  DBili  8.2<H>  /  AST  159<H>  /  ALT  112<H>  /  AlkPhos  669<H>      Lactate:    PT/INR - ( 2023 12:12 )   PT: 23.3 sec;   INR: 1.94          PTT - ( 2023 12:12 )  PTT:27.9 sec    CARDIAC MARKERS ( 2023 12:12 )  x     / x     / 43 U/L / x     / 2.2 ng/mL        Urinalysis Basic - ( 2023 15:33 )    Color: Yellow / Appearance: Clear / S.010 / pH: x  Gluc: x / Ketone: NEGATIVE  / Bili: Moderate / Urobili: 0.2 E.U./dL   Blood: x / Protein: NEGATIVE mg/dL / Nitrite: NEGATIVE   Leuk Esterase: Small / RBC: < 5 /HPF / WBC 5-10 /HPF   Sq Epi: x / Non Sq Epi: x / Bacteria: Present /HPF        MICRO:     IMAGING:   Surgery Consult    Consulting Surgical Team:   [ ] Team 1 - Colorectal/Surgical Oncology ([phone])    [ ] Team 2 - MIS/Bariatric Surgery ([phone])     [ ] Team 4 - Acute Care Surgery ([phone])     [ ] Team 5 - General Surgery/Breast/Pediatric Surgery ([phone])    Consulting Attending: Dr. Mcdonough    HPI:        PAST MEDICAL HISTORY:      PAST SURGICAL HISTORY:      MEDICATIONS:      ALLERGIES:  No Known Allergies      SOCHX:   Social History:      FAMHX:   FAMILY HISTORY:        Vitals:  Vital Signs Last 24 Hrs  T(C): 36.2 (2023 16:20), Max: 37.7 (2023 13:55)  T(F): 97.2 (2023 16:20), Max: 99.8 (2023 13:55)  HR: 94 (2023 16:20) (90 - 134)  BP: 127/63 (2023 16:20) (104/61 - 127/63)  BP(mean): --  RR: 16 (2023 16:20) (16 - 18)  SpO2: 95% (2023 16:20) (94% - 95%)    Parameters below as of 2023 16:20  Patient On (Oxygen Delivery Method): room air          PHYSICAL EXAM:  Physical Exam  General: NAD, resting comfortably in bed  Pulm: Nonlabored breathing, no respiratory distress  Abd: soft, non distended, incisions c/d/i, non tender  Extrem: WWP, no edema,  Neuro: A/O x 3, CNs II-XII grossly intact, no focal deficits, normal sensation  Pulses: palpable distal pulses     I&o's:  I&O's Summary      LABS:                        5.7    22.91 )-----------( 310      ( 2023 13:57 )             17.9     04-25    120<LL>  |  92<L>  |  11  ----------------------------<  105<H>  3.6   |  19<L>  |  0.57    Ca    7.7<L>      2023 15:33  Phos  2.6     04-25  Mg     1.7     04-25    TPro  5.8<L>  /  Alb  2.8<L>  /  TBili  10.8<H>  /  DBili  8.2<H>  /  AST  159<H>  /  ALT  112<H>  /  AlkPhos  669<H>      Lactate:    PT/INR - ( 2023 12:12 )   PT: 23.3 sec;   INR: 1.94          PTT - ( 2023 12:12 )  PTT:27.9 sec    CARDIAC MARKERS ( 2023 12:12 )  x     / x     / 43 U/L / x     / 2.2 ng/mL        Urinalysis Basic - ( 2023 15:33 )    Color: Yellow / Appearance: Clear / S.010 / pH: x  Gluc: x / Ketone: NEGATIVE  / Bili: Moderate / Urobili: 0.2 E.U./dL   Blood: x / Protein: NEGATIVE mg/dL / Nitrite: NEGATIVE   Leuk Esterase: Small / RBC: < 5 /HPF / WBC 5-10 /HPF   Sq Epi: x / Non Sq Epi: x / Bacteria: Present /HPF        MICRO:     IMAGING:   Surgery Consult    Consulting Surgical Team:   [ x ] Team 1 - Colorectal/Surgical Oncology ([phone])    [ ] Team 2 - MIS/Bariatric Surgery ([phone])     [ ] Team 4 - Acute Care Surgery ([phone])     [ ] Team 5 - General Surgery/Breast/Pediatric Surgery ([phone])    Consulting Attending: Dr. Mcdonough    HPI: 83F PMH dementia, HTN, anemia p/w jaundice. Pt has >1w of yellowing skin. Daughter states that pt denies any abd pain but that daughter thinks that pt appears like she is intermittently in pain. Went to GI Dr. Aguero yesterday, referred to ED today for Dr. Mathis. Labs from ~1mo ago w/ Na 132, normal LFTs, Hgb 9.7. Labs from yesterday w/ bili >10. Decreased PO intake and increased generalized weakness.   	No reported fevers, vomiting, diarrhea, weight loss, URI symptoms.   At baseline: Pt ambulatory w/ assistance, recognizes daughter (but doesn't know her name), basic conversation.    SURGICAL ONCOLOGY ADDENDUM  Above history reviewed with daughter. Patient with Hx of HTN, dementia presents with worsening jaundice, ?abdominal pain, over 1 week history. Patient last seen by family last month and appeared at baseline. Was noticed to have worsening yellowing of skin and light colored stool- brought for outpatient evaluation and sent to emergency for further evaluation. Patient not participatory in exam. No reported abdominal pain. Passing flatus with BM. No BRBPR or hematemesis. No family history of colon, pancreas cancer. Sister with breast cancer after age 60. No radiation exposure. Lives with . Sedentary, requires help for ADL and IADL, ECOG4             Vitals:  Vital Signs Last 24 Hrs  T(C): 36.2 (2023 16:20), Max: 37.7 (2023 13:55)  T(F): 97.2 (2023 16:20), Max: 99.8 (2023 13:55)  HR: 94 (2023 16:20) (90 - 134)  BP: 127/63 (2023 16:20) (104/61 - 127/63)  BP(mean): --  RR: 16 (2023 16:20) (16 - 18)  SpO2: 95% (2023 16:20) (94% - 95%)    Parameters below as of 2023 16:20  Patient On (Oxygen Delivery Method): room air          PHYSICAL EXAM:  Physical Exam  General: NAD, resting comfortably in bed, diffuse jaundice  Pulm: Nonlabored breathing, no respiratory distress  Abd: soft, mildly distended, non tender  ANDREA with dark stool no blood or masses  Extrem: WWP, no edema,  Alert to name only    I&o's:  I&O's Summary      LABS:                        5.7    22.91 )-----------( 310      ( 2023 13:57 )             17.9         120<LL>  |  92<L>  |  11  ----------------------------<  105<H>  3.6   |  19<L>  |  0.57    Ca    7.7<L>      2023 15:33  Phos  2.6       Mg     1.7         TPro  5.8<L>  /  Alb  2.8<L>  /  TBili  10.8<H>  /  DBili  8.2<H>  /  AST  159<H>  /  ALT  112<H>  /  AlkPhos  669<H>      Lactate:    PT/INR - ( 2023 12:12 )   PT: 23.3 sec;   INR: 1.94          PTT - ( 2023 12:12 )  PTT:27.9 sec    CARDIAC MARKERS ( 2023 12:12 )  x     / x     / 43 U/L / x     / 2.2 ng/mL        Urinalysis Basic - ( 2023 15:33 )    Color: Yellow / Appearance: Clear / S.010 / pH: x  Gluc: x / Ketone: NEGATIVE  / Bili: Moderate / Urobili: 0.2 E.U./dL   Blood: x / Protein: NEGATIVE mg/dL / Nitrite: NEGATIVE   Leuk Esterase: Small / RBC: < 5 /HPF / WBC 5-10 /HPF   Sq Epi: x / Non Sq Epi: x / Bacteria: Present /HPF        MICRO:     IMAGING:  < from: CT Chest w/ IV Cont (23 @ 14:32) >  ACC: [id number] EXAM:  CT CHEST IC   ORDERED BY: ANA M NIEVES     ACC: [id number] EXAM:  CT ABDOMEN AND PELVIS IC   ORDERED BY: ANA M NIEVES     PROCEDURE DATE:  2023          INTERPRETATION:  CLINICAL INFORMATION: New jaundice, evaluate for biliary   mass/biliary obstruction    COMPARISON: None.    CONTRAST/COMPLICATIONS:  IV Contrast: Isovue 370  90 cc administered   10 cc discarded  Oral Contrast: Gastroview  Complications: None reported at time of study completion    PROCEDURE:  CT of the Chest, Abdomen and Pelvis was performed. Arterial and portal   venous phase images were obtained.  Sagittal and coronal reformats were performed.    FINDINGS:  CHEST:  LUNGS AND LARGE AIRWAYS: Exam is somewhat degraded by breathing motion   artifact. Multiple varied sized pulmonary nodules are noted bilaterally.   These are compatible with metastasis. Right basilar focal atelectasis.   PLEURA: No pleural effusion.  VESSELS: Within normal limits.  HEART: Heart size is normal. No pericardial effusion.  MEDIASTINUM AND MARKO: Solitary, mildly enlarged right precarinal lymph   node measuring 1.2 cm.  CHEST WALL AND LOWER NECK: Within normal limits.    ABDOMEN AND PELVIS:  LIVER: No hepatic lesions  BILE DUCTS: Diffuse intrahepatic and extrahepatic biliary ductal   dilatation. Common bile duct measures up to 2.4 cm (6:38)  GALLBLADDER: Within normal limits.  SPLEEN: Within normal limits.  PANCREAS: A large, solid and cystic, partially necrotic lesion is seen in   the region of the uncinate process of the pancreas, measuring   approximately 5.5 x 9.5 x 5.8 cm (TV by AP by CC). Diffuse pancreatic   ductal dilatation. The pancreatic duct in the head of the pancreas is   somewhat ill-defined (6:30, 31). It appears that the lesion has eroded   into the lumen of the second/third portion of the duodenum as there is   fluid and necrotic debris within the bed of the lesion which may be   connected to the duodenal lumen. The portal vein is widely patent. Normal   branch pattern anatomy of the celiac axis with common hepatic artery   originating from the celiac axis. No distortion of the superior   mesenteric vein or superior mesenteric artery.  ADRENALS: Within normal limits.  KIDNEYS/URETERS: Within normal limits.    BLADDER: Within normal limits.  REPRODUCTIVE ORGANS: Macrocalcifications in the uterus, compatible with a   fibroid. No adnexal masses.    BOWEL: No bowel obstruction.  PERITONEUM: No ascites. There is no evidence of peritoneal metastasis.  VESSELS: Within normal limits.  RETROPERITONEUM/LYMPH NODES: No lymphadenopathy.  ABDOMINAL WALL: Within normal limits.  BONES: No suspicious osseous lesions    IMPRESSION:    1. 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 Diffuse pancreatic   ductal dilatation. The lesion appears to erode into the second/third   portion of the duodenum with resultant possible duodenal perforation.  2. Multiple pulmonary nodules, compatible with metastasis.    --- End of Report ---

## 2023-04-25 NOTE — H&P ADULT - PROBLEM SELECTOR PLAN 5
Large pancreatic mass noted on CT, unkown history. Elevated transaminanses w cholestatic pattern and hyper bilirubinemia mostly direct bili, low albumin, INR 1.94. Negative gaming's sign, abd soft non tender, and she is afebrile higher concern for malignancy. CT AP showing large mass in uncinate process of pancreas w likely metastatic lesions to lungs. GI consulted and tentative plan for ERCP Thurs/Fri. Surgery consulted w concern for invasion of mass into duodenum w perf, no acute surgical intervention.  - f/u GI  - trend LE, INR  - palliative consult, further GOC discussion Elevated transaminases w cholestatic pattern and hyper bilirubinemia mostly direct bili, low albumin, INR 1.94. Negative gaming's sign, abd soft non tender, and she is afebrile higher concern for malignancy. CT AP showing large mass in uncinate process of pancreas w likely metastatic lesions to lungs. GI consulted and tentative plan for ERCP Thurs/Fri. Surgery consulted w concern for invasion of mass into duodenum w perf, no acute surgical intervention.    - f/u GI  - trend LE, INR  - palliative consult, further GOC discussion

## 2023-04-25 NOTE — ED PROVIDER NOTE - CARE PLAN
Principal Discharge DX:	Jaundice   1 Principal Discharge DX:	Jaundice  Secondary Diagnosis:	Anemia  Secondary Diagnosis:	Hyponatremia

## 2023-04-25 NOTE — H&P ADULT - PROBLEM SELECTOR PLAN 4
Likely metastatic lesions, currently on RA, no respiratory distress.  - f/u ERCP, biopsy  - f/u palliative Tachcyardia to ____ and leukocytosis to 20. Empiric vancomycin and Zosyn and Fluconazole. At this time no indication to continue fluconazole. CT evidence with large necrotic mass as above and pulmonary mets,     - No signs of active infection?     - Draw blood cultures. Tachcyardia to 122 and leukocytosis to 20. Empiric vancomycin and Zosyn and Fluconazole. At this time no indication to continue fluconazole. CT evidence with large necrotic mass as above and pulmonary mets, surgery consult with no concerns for perforation.     - blood cultures drawn.   - One dose of Fluconazole 500 mg IVP, Vancomycin, and Zosyn.   - Continue Zosyn 3.375 mg IV b2tdker.

## 2023-04-25 NOTE — ED PROVIDER NOTE - OBJECTIVE STATEMENT
Kari, Daughter Elly at bedside translating/providing info.  83F PMH dementia, HTN, anemia p/w jaundice. Pt has >1w of yellowing skin. Daughter states that pt denies any abd pain but that daughter thinks that pt appears like she is intermittently in pain. Went to GI Dr. Aguero yesterday, referred to ED today for Dr. Mathis. Labs from ~1mo ago w/ Na 123, normal LFTs, Hgb 9.7. Labs from yesterday w/ bili >10. Decreased PO intake and increased generalized weakness.   No reported fevers, vomiting, diarrhea, weight loss, URI symptoms.   At baseline: Pt ambulatory w/ assistance, recognizes daughter (but doesn't know her name), basic conversation. Kari, Daughter Elly at bedside translating/providing info.  83F PMH dementia, HTN, anemia p/w jaundice. Pt has >1w of yellowing skin. Daughter states that pt denies any abd pain but that daughter thinks that pt appears like she is intermittently in pain. Went to GI Dr. Aguero yesterday, referred to ED today for Dr. Mathis. Labs from ~1mo ago w/ Na 132, normal LFTs, Hgb 9.7. Labs from yesterday w/ bili >10. Decreased PO intake and increased generalized weakness.   No reported fevers, vomiting, diarrhea, weight loss, URI symptoms.   At baseline: Pt ambulatory w/ assistance, recognizes daughter (but doesn't know her name), basic conversation.

## 2023-04-25 NOTE — CONSULT NOTE ADULT - SUBJECTIVE AND OBJECTIVE BOX
HPI:  83 y/oF  Cantonese speaking PMH Dementia,  HTN, LEW ( baseline Hgb 9.7), presented with 1 week of jaundice, fatigue and decreased po intake. Pt was seen by PMD Dr. Kary Ly on 23, blood test showed Tbili 10.8, , , , WBC 22.8K, Hgb 6, and Serum Sodium 125.Pt had normal LFT one month ago ( TBili<0.2, ALP 56, AST 18 and ALT 11).  Pt was sent to [facility]/Dr. Yonas Evans whom referred pt to the hospital for further management.   Pt denies fever, chills, abd pain, N/V, melena, rectal bleed, weight loss, etc. HHA noted urine is dark.     At ED, LFT showed TBili 10.8, DBili 8.2, , , , WBC 22.9K, Hgb 5.7, SNa+ 122. CTAP reviewed w. 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    ICU consulted for severe hyponatremia, pending disposition awaiting repeat BMP.        PAST MEDICAL & SURGICAL HISTORY: as above, no Surgery     Home Medications: reviewed     Allergies    No Known Allergies    Intolerances      FAMILY HISTORY: NC to present illness     Social History: no toxic habits,  w. 3 children ( daughter in [place] and 2 sons in [facility]), lives .w  and has HHA       REVIEW OF SYSTEMS:  CONSTITUTIONAL: No fever, weight loss  EYES: No eye pain, or discharge  ENMT:  No tinnitus, vertigo  NECK: No pain or stiffness  RESPIRATORY: No cough, No dyspnea  CARDIOVASCULAR: No chest pain, or leg swelling  GASTROINTESTINAL: No abdominal pain. No diarrhea ;No melena or hematochezia.  GENITOURINARY: No dysuria, frequency, or hematuria  NEUROLOGICAL: No numbness, or tremors  SKIN: No itching, burning, rashes, or lesions   ENDOCRINE: No heat or cold intolerance;  MUSCULOSKELETAL: No joint pain or swelling;   PSYCHIATRIC: No mood swings, or difficulty sleeping  HEME/LYMPH: No easy bruising, or bleeding gums  ALLERGY AND IMMUNOLOGIC: No hives or eczema        CURRENT MEDICATIONS:   fluconAZOLE IVPB 800 milliGRAM(s) IV Intermittent once  vancomycin  IVPB. 1000 milliGRAM(s) IV Intermittent once      VITAL SIGNS, INS/OUTS (last 24 hours):  Vital Signs Last 24 Hrs  T(C): 36.2 (2023 16:20), Max: 37.7 (2023 13:55)  T(F): 97.2 (2023 16:20), Max: 99.8 (2023 13:55)  HR: 94 (2023 16:20) (90 - 134)  BP: 127/63 (2023 16:20) (104/61 - 127/63)  BP(mean): --  RR: 16 (2023 16:20) (16 - 18)  SpO2: 95% (2023 16:20) (94% - 95%)    Parameters below as of 2023 16:20  Patient On (Oxygen Delivery Method): room air      I&O's Summary      PHYSICAL EXAM:  Gen: Reclining in bed at time of exam, appears stated age, jaundice   HEENT: NCAT, MMM, clear OP  Neck: supple, trachea at midline, no LAD   CV: RRR, +S1/S2  Pulm: adequate respiratory effort, no increase in work of breathing  Abd: ND, mildly fullness, normal BS, soft, NT, no HSM appreciated   Skin: warm and dry,  Ext: WWP, no LE edema  Neuro: AOx3, no gross focal neurological deficits  Psych: affect and behavior appropriate, pleasant at time of interview    BASIC LABS:                        5.7    22.91 )-----------( 310      ( 2023 13:57 )             17.9         120<LL>  |  92<L>  |  11  ----------------------------<  105<H>  3.6   |  19<L>  |  0.57    Ca    7.7<L>      2023 15:33  Phos  2.6       Mg     1.7         TPro  5.8<L>  /  Alb  2.8<L>  /  TBili  10.8<H>  /  DBili  8.2<H>  /  AST  159<H>  /  ALT  112<H>  /  AlkPhos  669<H>      PT/INR - ( 2023 12:12 )   PT: 23.3 sec;   INR: 1.94          PTT - ( 2023 12:12 )  PTT:27.9 sec  Urinalysis Basic - ( 2023 15:33 )    Color: Yellow / Appearance: Clear / S.010 / pH: x  Gluc: x / Ketone: NEGATIVE  / Bili: Moderate / Urobili: 0.2 E.U./dL   Blood: x / Protein: NEGATIVE mg/dL / Nitrite: NEGATIVE   Leuk Esterase: Small / RBC: < 5 /HPF / WBC 5-10 /HPF   Sq Epi: x / Non Sq Epi: x / Bacteria: Present /HPF      CAPILLARY BLOOD GLUCOSE          OTHER LABS:  CARDIAC MARKERS ( 2023 12:12 )  x     / x     / 43 U/L / x     / 2.2 ng/mL        MICRODATA:      IMAGING:    EKG:    #Diet -       #DVT PPx -

## 2023-04-25 NOTE — ED ADULT NURSE NOTE - INTERPRETER NAME
Spoke to patient via telephone. Ultrasound results reviewed. CA-125 ordered. Patient seeing urology for urinary S/S. Reassurance provided. All questions answered. Patient verbalized understanding.     evelyn

## 2023-04-25 NOTE — ED PROVIDER NOTE - PROGRESS NOTE ADDITIONAL2
Global muscle tone and symmetry normal; joint contractures absent; periods of alertness noted; grossly responds to touch, light and sound stimuli; gag reflex present; normal suck-swallow patterns for age; cry with normal variation of amplitude and frequency; tongue motility size, and shape normal without atrophy or fasciculations;  deep tendon knee reflexes normal pattern for age; stacia, and grasp reflexes acceptable. Additional Progress Note...

## 2023-04-25 NOTE — H&P ADULT - ATTENDING COMMENTS
New diagnosis of metastatic disease, of pancreatic origin based on imaging with malignant CBD obstruction, hyponatremia and anemia. Imaging with mass erosion with impending perforation and probable microperforation. Broad spectrum abx. Transfuse to Hb > 7. Involve GI for palliative CBD decompression if possible. Monitor asymptomatic hyponatremia for now; workup in progress. Continue GOC discussion. Rest of plan as per above.

## 2023-04-25 NOTE — H&P ADULT - PROBLEM SELECTOR PLAN 1
Normocytic anemia w hgb 5.7 (down from 9.7 1 month ago on ferrous sulfate PO 325mg daily). Given absolute reticulocyte count of 4.6, elevated indirect bilirubin (though could also be due to intrahepatic process) and no signs of GI blood loss there is concern for hemolysis. Pending 2 units PRBC transfusion. Haptoglobin hemolyzed.   - Follow up haptoglobin, LDH, fibrinogen, Will testing.    - iron studies   - folate  - active t+s  - transfuse <7  - post transfusion CBC Pancreatic mass leading to obstructive juandice; noted on CT scan today. GI and Surgery consultation placed with plans for possible ERCP with stenting/biopsy of mass for diagnosis if that is within goals of care. No acute surgical interventions planned. ON CT large necrotic mass with upstream dilation of PD and CBD and imaging also suggests pulmonary metastases.     - Follow up GI recommendations with plans for ERCP.  - Palliative care consultation.

## 2023-04-25 NOTE — CONSULT NOTE ADULT - ASSESSMENT
82yo female with dementia and HTN presents with worsening jaundice and anemia. Afebrile, HDS, abdomen mildly distended. Diffuse jaundice, and icterus, non tender, ANDREA without stigmata of bleeding. Non acute abdomen. Labs demonstrate WBC of 22k, Hgb 5.6, Na 122, TB 10 elevated LFTs. CT demonstrates necrotic pancreatic mass with possible extension into lumen of duodenum, biliary ductal dilation, possible pulmonary lesions. No pneumoperitoneum. Impression is likely neoplasm of pancreas or duodenum, with high suspicion for pulmonary metastasis. Extensive discussion with daughter who is HCP. Patient likely would not want surgical intervention.     - no acute surgical intervention  - Recommend evaluation for admission to monitored medical service  - GI consultation for biliary decompression and tissue sampling  - Palliative care consultation for GOC  - Would continue Zosyn, likely no indication for continued fluconazole  - Continued resuscitation  - Surgery Team 1C will continue to follow. Please page Team 1 with questions/clinical changes. [phone]

## 2023-04-25 NOTE — ED ADULT NURSE NOTE - NSIMPLEMENTINTERV_GEN_ALL_ED
Implemented All Fall Risk Interventions:  [place] to call system. Call bell, personal items and telephone within reach. Instruct patient to call for assistance. Room bathroom lighting operational. Non-slip footwear when patient is off stretcher. Physically safe environment: no spills, clutter or unnecessary equipment. Stretcher in lowest position, wheels locked, appropriate side rails in place. Provide visual cue, wrist band, yellow gown, etc. Monitor gait and stability. Monitor for mental status changes and reorient to person, place, and time. Review medications for side effects contributing to fall risk. Reinforce activity limits and safety measures with patient and family.

## 2023-04-25 NOTE — CONSULT NOTE ADULT - ASSESSMENT
84 y/o Cantonese speaking, history obtained from daughter, F PMH HTN, dementia presents with 1 week of jaundice. GI consulted for jaundice.     #Jaundice  #Uncinate process pancreas mass  - personally reviewed imaging with Dr. Villagomez in Radiology; large necrotic mass with upstream dilation of PD and CBD  - imaging also suggests pulmonary metastases   - d/w daughter at bedside and advised GOC discussion with family  - will tentatively plan for EUS-ERCP on Thursday/Friday to obtain tissue dx and relieve biliary obstruction (may possibly biopsy with regular biopsy forceps if mass is invading into lumen enough)   - please medically optimize anemia and hyponatremia in preparation for procedure    Ottoniel Nj MD  PGY-6, Gastroenterology Fellow  pager: [phone]

## 2023-04-25 NOTE — H&P ADULT - NSHPLABSRESULTS_GEN_ALL_CORE
.  LABS:                         5.7    22.91 )-----------( 310      ( 2023 13:57 )             17.9     04    120<LL>  |  92<L>  |  11  ----------------------------<  105<H>  3.6   |  19<L>  |  0.57    Ca    7.7<L>      2023 15:33  Phos  2.6       Mg     1.7         TPro  5.8<L>  /  Alb  2.8<L>  /  TBili  10.8<H>  /  DBili  8.2<H>  /  AST  159<H>  /  ALT  112<H>  /  AlkPhos  669<H>      PT/INR - ( 2023 12:12 )   PT: 23.3 sec;   INR: 1.94          PTT - ( 2023 12:12 )  PTT:27.9 sec  Urinalysis Basic - ( 2023 15:33 )    Color: Yellow / Appearance: Clear / S.010 / pH: x  Gluc: x / Ketone: NEGATIVE  / Bili: Moderate / Urobili: 0.2 E.U./dL   Blood: x / Protein: NEGATIVE mg/dL / Nitrite: NEGATIVE   Leuk Esterase: Small / RBC: < 5 /HPF / WBC 5-10 /HPF   Sq Epi: x / Non Sq Epi: x / Bacteria: Present /HPF      CARDIAC MARKERS ( 2023 12:12 )  x     / x     / 43 U/L / x     / 2.2 ng/mL            RADIOLOGY, EKG & ADDITIONAL TESTS: Reviewed.

## 2023-04-25 NOTE — H&P ADULT - NSHPPHYSICALEXAM_GEN_ALL_CORE
VITAL SIGNS:  T(C): 37 (04-25-23 @ 19:45), Max: 37.7 (04-25-23 @ 13:55)  T(F): 98.6 (04-25-23 @ 19:45), Max: 99.8 (04-25-23 @ 13:55)  HR: 82 (04-25-23 @ 19:45) (78 - 134)  BP: 105/58 (04-25-23 @ 19:45) (103/60 - 127/63)  BP(mean): --  RR: 16 (04-25-23 @ 19:45) (16 - 20)  SpO2: 94% (04-25-23 @ 19:45) (94% - 96%)  Wt(kg): --    PHYSICAL EXAM:  Constitutional: Patient is in no acute distress, resting comfortably in bed.  HEENT: Atraumatic/normocephalic. PERRL, EOMI, anicteric sclera, no nasal discharge; uvula midline, no oropharyngeal erythema or exudates; mucous membranes moist.   Neck: supple; no JVD or thyromegaly.  Respiratory: Clear to auscultation bilaterally; no Wheezing/Crackles/Ronchi, no accessory muscle use.   Cardiac: Regular rate and rhythm, S1/S2; no Murmur/Rub/Gallop; PMI non-displaced.  Gastrointestinal: abdomen soft, non-tender and non-distended; no rebound or guarding; Normoactive bowel sounds.   Back: spine midline, no bony tenderness or step-offs; no CVAT B/L  Extremities: Warm and Well perfused, no clubbing or cyanosis; no peripheral edema  Musculoskeletal: Normal ROM in upper and lower extremities; no joint swelling, tenderness or erythema  Vascular: 2+ radial, Dorsalis pedis and posterior tibial pulses bilaterally.  Dermatologic: skin warm, dry and intact; no rashes, wounds, or scars  Lymphatic: no submandibular or cervical LAD  Neurologic: AAOx3; CNII-XII grossly intact; no focal deficits  Psychiatric: affect and characteristics of appearance, verbalizations, behaviors are appropriate VITAL SIGNS:  T(C): 37 (04-25-23 @ 19:45), Max: 37.7 (04-25-23 @ 13:55)  T(F): 98.6 (04-25-23 @ 19:45), Max: 99.8 (04-25-23 @ 13:55)  HR: 82 (04-25-23 @ 19:45) (78 - 134)  BP: 105/58 (04-25-23 @ 19:45) (103/60 - 127/63)  BP(mean): --  RR: 16 (04-25-23 @ 19:45) (16 - 20)  SpO2: 94% (04-25-23 @ 19:45) (94% - 96%)  Wt(kg): --    PHYSICAL EXAM:  Constitutional: Jaundiced female resting comfortably in bed.  HEENT: Conjunctival pallor, refusing to open eyes. dry mucous membranes.   Neck: supple;  Respiratory: Clear to auscultation bilaterally;    Cardiac: Regular rate and rhythm, S1/S2; no Murmur/Rub/Gallop;  Gastrointestinal: Distended but nontender, tympanic in all quadrants, no tenderness.   Extremities: Warm and Well perfused, no peripheral edema; 2+ radial, Dorsalis pedis and posterior tibial pulses bilaterally.  Neurologic: alert but only responding in single word responses to daughter.

## 2023-04-25 NOTE — CHART NOTE - NSCHARTNOTEFT_GEN_A_CORE
84 yo F Cantonese speaking w/ HTN, Dementia presented w/ fatigue, jaundice and decreased PO intake. Also with outpatient labs showing T.bili 10.8, WBC 22.8, . . Subsequent imaging in ER showed pancreatic mass likely advanced pancreatic cancer. Pt w/ Na 122 on presentation which dropped to 120 s/p 1 L NS bolus    #Chronic asymptomatic hyponatremia  Na 122-->120  No hx of hyponatremia. Not on thiazide  Lemuel and UOsm pending    Recommend:  Get Lemuel and UOsm stat  Avoid NS unless BP drops and is required for resuscitation  Fluid restriction 1L/day  Check TSH and AM cortisol  Recheck BMP x 1 tonight, can do at 10 PM  Na can go up to 128-130 by 12 PM 4/26      Discussed with Nephrology attending Dr. Perdue. Full consult note to follow in AM 84 yo F Cantonese speaking w/ HTN, Dementia presented w/ fatigue, jaundice and decreased PO intake. Also with outpatient labs showing T.bili 10.8, WBC 22.8, . . Subsequent imaging in ER showed pancreatic mass likely advanced pancreatic cancer. Pt w/ Na 122 on presentation which dropped to 120 s/p 1 L NS bolus    #Chronic asymptomatic hyponatremia  Na 122-->120  No hx of hyponatremia. Not on thiazide  Lemuel and UOsm pending    Recommend:  Get Lemuel and UOsm stat  Avoid NS unless BP drops and is required for resuscitation  Fluid restriction 1L/day  Check TSH and AM cortisol  Recheck BMP x 1 tonight, can do at 10 PM  Na can go up to 128-130 by 12 PM 4/26  If anytime pt develops neurological symtpoms eg, confusion, seizure, give 100ml 3% saline bolus      Discussed with Nephrology attending Dr. Perdue. Full consult note to follow in AM

## 2023-04-25 NOTE — CONSULT NOTE ADULT - ASSESSMENT
83 y/oF  Cantonese speaking PMH Dementia,  HTN, LEW ( baseline Hgb 9.7), presented with 1 week of jaundice, fatigue and decreased po intake. Pt was seen by PMD Dr. Kary Ly on 4/24/23, blood test showed Tbili 10.8, , , , WBC 22.8K, Hgb 6, and Serum Sodium 125.Pt had normal LFT one month ago ( TBili<0.2, ALP 56, AST 18 and ALT 11).  Pt was sent to [facility]/Dr. Yonas Evans whom referred pt to the hospital for further management. CTAP reviewed w. 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    # Painless jaundice   # Necrotic mass at the uncinate process of pancreas likely advanced Pancreatic carcinoma   # Malignant CBD obstruction   # Pulm nodules likely Pulm mets   # Severe Hyponatremia   # Failure to thrive   # Dementia   # Leukocytosis   # Severe Anemia   # Coagulopathy ( INR 1.95) ? Vit K deficiency   - ICU consult appreciated, s/p IVF 1L  awaiting repeat BMP for hyponatremia   - check TSH, am cortisol, Serum OSM, Urine OSM, urine lytes likely combination of decreased solute intake, and SIADH   -trend BMP goal not to exceed 6-8mmol by tomorrow am   - transfuse 2U PRBC, keep Hgb>7, anemia w/u if severe LEW, consider IV iron 300mg iv daily for 3 days (if infection ruled out)  - check DIC panel/ [facility]  - Vitamin K 10mg and repeat INR goal <1.5 for GI procedure   - Leukocytosis: no clinical evidence of infection although pt has CBD obstruction, may consider iv abx  for Cholangitis coverage empirically, trend WBC ,Leukocytosis could also be 2/2 leukemoid reaction from necrotic tumor  - trend LFT for transaminitis/obstructive pattern, check viral hepatitis serology   - GI consult appreciated, is[facility] GI fellow Dr. Ottoniel Nj, plan for endoscopic procedure on either this Thurs 4/27 or Friday 4/28 after optimization of medical condition   - Heme Onc consult, doubt pt would be a candidate for chemotherapy  - Nutrition consult   - Rehab consult   - Palliative care consult for GOC discussion, please complete HCP ( daughter and ) if pt has capacity to appoint HCP otherwise default to her  as surrogate. GOC d/w daughter whom with  d/w her father   - code status: FULL CODE for now   - VTE ppx; SCDs    Contact:  Elly Mares ( daughter) [phone]   PMD: Dr. Kary Ozuna [phone]  GI: Dr. Yonas Evans/[facility] [phone]

## 2023-04-25 NOTE — H&P ADULT - HISTORY OF PRESENT ILLNESS
Patient is a 83y old  Female who presents with a chief complaint of jaundice    Consult reason: hyponatremia, anemia  ED vitals: T 98.6  -->98  RR 18 /61 SpO2 94% RA  Labs signficant: WBC 22.35, 92% neutrophils, hgb 5.6, MCV 94.6, Na 122, serum osm 261, , , , total protein 5.8, albumin 2.8, total bili 10.8, direct bili 8.2, indirect bili 2.6, lipase 12  Imaging/EKG: CXR w patchy b/l opacities, ekg w sinus tachycardia to 122  Interventions: 1 L NS, pantoprazole IV 80mg    HPI: 84 y/o Cantonese speaking, history obtained from daughter, F PMH HTN, dementia presents with 1 week of jaundice. She presented to her out patient GI, Dr. Aguero, and he referred her to our ED due to jaundice. She has had yellowing of the skin of 1 week and worsening fatigue over the same time. She usually ambulates with assistance but for the past 1 day she has not been too tired to get up. Other then fatigue the daughter notes no change in mental status. Although the patient denied pain, her daughter has seen her intermittently holding her stomach in discomfort. She has had reduced PO intake and unspecified amount of weight loss which the daughter contributes to not eating as much. She denies any black stools, BRBPR, diarrhea, vomiting, f/c, SOB, CP.    One month ago her hgb was 9.7, ALP 56, AST 18, ALT 11, total protein 6.8, Albumin 3.8, total bilirubin <0.2 Consult reason: hyponatremia, anemia  ED vitals: T 98.6  -->98  RR 18 /61 SpO2 94% RA  Labs signficant: WBC 22.35, 92% neutrophils, hgb 5.6, MCV 94.6, Na 122, serum osm 261, , , , total protein 5.8, albumin 2.8, total bili 10.8, direct bili 8.2, indirect bili 2.6, lipase 12  Imaging/EKG: CXR w patchy b/l opacities, ekg w sinus tachycardia to 122  Interventions: 1 L NS, pantoprazole IV 80mg    HPI: 84 y/o Cantonese speaking, history obtained from daughter, F PMH HTN, dementia presents with 1 week of jaundice. She presented to her out patient GI, Dr. Aguero, and he referred her to our ED due to jaundice. She has had yellowing of the skin of 1 week and worsening fatigue over the same time. She usually ambulates with assistance but for the past 1 day she has not been too tired to get up. Other then fatigue the daughter notes no change in mental status. Although the patient denied pain, her daughter has seen her intermittently holding her stomach in discomfort. She has had reduced PO intake and unspecified amount of weight loss which the daughter contributes to not eating as much. She denies any black stools, BRBPR, diarrhea, vomiting, f/c, SOB, CP.    One month ago her hgb was 9.7, ALP 56, AST 18, ALT 11, total protein 6.8, Albumin 3.8, total bilirubin <0.2

## 2023-04-25 NOTE — ED PROVIDER NOTE - PROGRESS NOTE DETAILS
[place]: WBC 22.3, hgb 5.6, INR 1.94, osm 261, hgb 5.6, (drop from yesterday), Na 122 (also decreased from yesterday, likely related to dehydration), Cl 94, bicarb 21, Ca 8.2 (albumin 2.8), Bili 10.8, alk phos 669, , . Daughter now states that pt's  reports that pt may have been having darker stool. Rectal exam w/ chaperone: 99.8 rectal temp, tiny speck of black obtained on ANDREA but no melena or BRBPR. Will give blood. Possible stable UGIB. CT pending, ICU consulted for Na 122, pt remains stable, will reassess. Updated daughter. Chrissy: Updated Dr. Mathis, inpt team can consult general GI. ICU consult, CTs pending. [place]: UA w/ small leuk esterase, 5-10 WBCs.   CT showed "1. Large cystic and solid, partially necrotic lesion in the region of the uncinate process of the pancreas, suspicious for primary adenocarcinoma of the uncinate process.The lesion is causing severe diffuse biliary ductal dilatation with the CBD measuring up to 2.4 cm. Diffuse pancreatic ductal dilatation. The lesion appears to erode into the second/third portion of the duodenum with resultant possible duodenal perforation. 2. Multiple pulmonary nodules, compatible with metastasis." Surgery consulted, will cover w/ empiric abx for possible perf.   Surgery consulted.   Was evaluated by GI prior. Updated MICU.  Pt remains stable, updated daughter. [place]: Rpt Na decreased to 120, rediscussed w/ surgery, recommending no surgical intervention, rediscussed w/ MICU, will admit step down for further care.

## 2023-04-25 NOTE — CONSULT NOTE ADULT - SUBJECTIVE AND OBJECTIVE BOX
Patient is a 83y old  Female who presents with a chief complaint of jaundice    Consult reason: hyponatremia, anemia  ED vitals: T 98.6  -->98  RR 18 /61 SpO2 94% RA  Labs signficant: WBC 22.35, 92% neutrophils, hgb 5.6, MCV 94.6, Na 122, serum osm 261, , , , total protein 5.8, albumin 2.8, total bili 10.8, direct bili 8.2, indirect bili 2.6, lipase 12  Imaging/EKG: CXR w patchy b/l opacities, ekg w sinus tachycardia to 122  Interventions: 1 L NS, pantoprazole IV 80mg    HPI: 82 y/o Cantonese speaking, history obtained from daughter, F PMH HTN, dementia presents with 1 week of jaundice. She presented to her out patient GI, Dr. Aguero, and he referred her to our ED due to jaundice. She has had yellowing of the skin of 1 week and worsening fatigue over the same time. She usually ambulates with assistance but for the past 1 day she has not been too tired to get up. Other then fatigue the daughter notes no change in mental status. Although the patient denied pain, her daughter has seen her intermittently holding her stomach in discomfort. She has had reduced PO intake and unspecified amount of weight loss which the daughter contributes to not eating as much. She denies any black stools, BRBPR, diarrhea, vomiting, f/c, SOB, CP.    One month ago her hgb was 9.7, ALP 56, AST 18, ALT 11, total protein 6.8, Albumin 3.8, total bilirubin <0.2      Allergies    No Known Allergies    Intolerances      Home Medications:       SOCIAL HX:     Smoking          ETOH/Illicit drugs          Occupation    PAST MEDICAL & SURGICAL HISTORY:      FAMILY HISTORY:  :    No known cardiovascular or pulmonary family history     ROS:  See HPI     PHYSICAL EXAM    ICU Vital Signs Last 24 Hrs  T(C): 37.7 (25 Apr 2023 13:55), Max: 37.7 (25 Apr 2023 13:55)  T(F): 99.8 (25 Apr 2023 13:55), Max: 99.8 (25 Apr 2023 13:55)  HR: 90 (25 Apr 2023 12:12) (90 - 134)  BP: 104/61 (25 Apr 2023 11:16) (104/61 - 104/61)  BP(mean): --  ABP: --  ABP(mean): --  RR: 18 (25 Apr 2023 11:16) (18 - 18)  SpO2: 94% (25 Apr 2023 11:16) (94% - 94%)    O2 Parameters below as of 25 Apr 2023 11:16  Patient On (Oxygen Delivery Method): room air            General: elderly F eyes closed lying flat, appears comfortable, easily arousable  HEENT:  scleral icterus, equal reactive pupils              Lymphatic system: No no jvd  Lungs: faint course crackles on R, no increased work of breathing, on RA  Cardiovascular: Tachycardic to 90s, regular, no murmur  Gastrointestinal: Soft, mild distension, nontender, no rebound, no gaming's sign   Musculoskeletal: No clubbing. trace pretibial edema  Skin: Warm. Intact  Neurological: appears fatigued but easily arousable and talking        LABS:                          5.7    22.91 )-----------( 310      ( 25 Apr 2023 13:57 )             17.9                                               04-25    122<L>  |  94<L>  |  13  ----------------------------<  128<H>  3.9   |  21<L>  |  0.63    Ca    8.2<L>      25 Apr 2023 12:12  Phos  2.6     04-25  Mg     1.7     04-25    TPro  5.8<L>  /  Alb  2.8<L>  /  TBili  10.8<H>  /  DBili  8.2<H>  /  AST  159<H>  /  ALT  112<H>  /  AlkPhos  669<H>  04-25      PT/INR - ( 25 Apr 2023 12:12 )   PT: 23.3 sec;   INR: 1.94          PTT - ( 25 Apr 2023 12:12 )  PTT:27.9 sec                                           CARDIAC MARKERS ( 25 Apr 2023 12:12 )  x     / x     / 43 U/L / x     / 2.2 ng/mL                                            LIVER FUNCTIONS - ( 25 Apr 2023 12:12 )  Alb: 2.8 g/dL / Pro: 5.8 g/dL / ALK PHOS: 669 U/L / ALT: 112 U/L / AST: 159 U/L / GGT: x                                                                                                                                           ECHO:    MEDICATIONS  (STANDING):    MEDICATIONS  (PRN):

## 2023-04-25 NOTE — ED PROVIDER NOTE - PHYSICAL EXAMINATION
Eyes closed, appears comfortable.  abd: soft, mild diffuse ttp, no rebound/guarding.   jaundiced. Eyes closed, appears comfortable.  abd: soft, mild diffuse ttp, no rebound/guarding.   jaundiced.  addendum: Rectal exam w/ chaperone: 99.8 rectal temp, tiny speck of black obtained on ANDREA but no melena or BRBPR.

## 2023-04-25 NOTE — H&P ADULT - PROBLEM SELECTOR PLAN 3
Elevated transaminanses w cholestatic pattern and hyper bilirubinemia mostly direct bili, low albumin, INR 1.94. Negative gaming's sign, abd soft non tender, and she is afebrile higher concern for malignancy. CT AP showing large mass in uncinate process of pancreas w likely metastatic lesions to lungs. GI consulted and tentative plan for ERCP Thurs/Fri. Surgery consulted w concern for invasion of mass into duodenum w perf, no acute surgical intervention.  - f/u GI  - trend LE, INR  - palliative consult, further GOC discussion Assume chronic hyponatremia, though pretibial edema, does not appear fluid overloaded. 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 Renal consulted.    - NPO for now, keep fluid restriction to <1L.   - f/u Urine lytes  - Q6 BMP  - f/u nephrology recommendations.   - F/u Cortisol and TSH

## 2023-04-25 NOTE — ED PROVIDER NOTE - CLINICAL SUMMARY MEDICAL DECISION MAKING FREE TEXT BOX
83F PMH dementia, HTN, anemia p/w jaundice. Pt has >1w of yellowing skin. Daughter states that pt denies any abd pain but that daughter thinks that pt appears like she is intermittently in pain. Went to GI Dr. Aguero yesterday, referred to ED today for Dr. Mathis. Labs from ~1mo ago w/ Na 123, normal LFTs, Hgb 9.7. Labs from yesterday w/ bili >10. Decreased PO intake and increased generalized weakness.   Initial triage tachycardia self resolved, other vitals wnl. Exam as above.  ddx: New jaundice, likely obstructive process. Likely mild dehydration.   Labs, CT, CXR, UA, IVF, reassess. 83F PMH dementia, HTN, anemia p/w jaundice. Pt has >1w of yellowing skin. Daughter states that pt denies any abd pain but that daughter thinks that pt appears like she is intermittently in pain. Went to GI Dr. Aguero yesterday, referred to ED today for Dr. Mathis. Labs from ~1mo ago w/ Na 132, normal LFTs, Hgb 9.7. Labs from yesterday w/ bili >10. Decreased PO intake and increased generalized weakness.   Initial triage tachycardia self resolved, other vitals wnl. Exam as above.  ddx: New jaundice, likely obstructive process. Likely mild dehydration.   Labs, CT, CXR, UA, IVF, reassess.

## 2023-04-25 NOTE — H&P ADULT - PROBLEM SELECTOR PLAN 7
F:   E:   N:   DVT px: - c/w home memantine 5mg bid and donepezil 10mh qhs and paroxetine 20mg qd  - monitor for changes in mental status given transaminitis and hyponatremia (plan below)

## 2023-04-25 NOTE — ED ADULT TRIAGE NOTE - CHIEF COMPLAINT QUOTE
sent into ED by GI doctor for jaundice. pt with hx dementia, Daughter with pt, pt is cantonese speaking only, denies fever/chills

## 2023-04-26 NOTE — PROGRESS NOTE ADULT - ASSESSMENT
82 y/o Cantonese speaking, history obtained from daughter, F PMH HTN, dementia presents with 1 week of jaundice. GI consulted for jaundice.     #Jaundice  #Uncinate process pancreas mass  - personally reviewed imaging with Dr. Villagomez in Radiology; large heterogeneous necrotic mass with upstream dilation of PD and CBD  - imaging also suggests pulmonary metastases   - d/w daughter at bedside and advised GOC discussion with family  - s/p vit K IV 10 mg for INR reversal   - plan for EUS-ERCP tomorrow   - NPO past MN     Ottoniel Nj MD  PGY-6, Gastroenterology Fellow  pager: [phone]

## 2023-04-26 NOTE — PROGRESS NOTE ADULT - PROBLEM SELECTOR PLAN 2
Normocytic anemia w hgb 5.7 (down from 9.7 1 month ago on ferrous sulfate PO 325mg daily). S/p 2 units PRBC transfusion with improvement in hemoglobin. LDH and hapto wnl. Borderline iron deficiency noted on lab work.     - Maintain active T&S.   - Transfuse <7

## 2023-04-26 NOTE — PROGRESS NOTE ADULT - PROBLEM SELECTOR PLAN 6
Likely metastatic lesions, currently on RA, no respiratory distress.  - f/u ERCP, biopsy  - f/u palliative

## 2023-04-26 NOTE — PROGRESS NOTE ADULT - PROBLEM SELECTOR PLAN 3
Assume chronic hyponatremia, though pretibial edema, does not appear fluid overloaded. 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 Renal consulted.    - NPO for now, keep fluid restriction to <1L.   - f/u Urine lytes  - Q6 BMP  - f/u nephrology recommendations.   - F/u Cortisol and TSH Assume chronic hyponatremia, though mild pretibial edema, does not appear fluid overloaded. 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 Renal consulted. Serum osm 264.   - nephrology consulted, appreciate recs  - Keep fluid restriction to <1L.   - monitor BMP bid, next BMP @ 2pm  - f/u TSH, FT4, AM cortisol, uric acid  - goal serum Na 128-130 by noon 4/26

## 2023-04-26 NOTE — PROGRESS NOTE ADULT - PROBLEM SELECTOR PLAN 4
Tachcyardia to 122 and leukocytosis to 20. S/p empiric vancomycin and Zosyn and Fluconazole in ED. At this time no indication to continue fluconazole. CT evidence with large necrotic mass as above and pulmonary mets, surgery consult with no concerns for perforation. No active signs of infection.   - Continue Zosyn 3.375 mg IV [id number] ascending cholangitis ppx

## 2023-04-26 NOTE — CONSULT NOTE ADULT - ASSESSMENT
83F Cantonese speaking, PMH HTN, dementia presents with 1 week of jaundice, with findings of new large pancreatic uncinate mass and pulmonary nodules concerning for metastatic pancreatic cancer. Oncology consulted for further recommendations.       -appreciate GI recs, agree with  EUS-ERCP on Thursday/Friday to obtain tissue dx and relieve biliary obstruction   - 83F Cantonese speaking, PMH HTN, dementia presents with 1 week of jaundice, with findings of new large pancreatic uncinate mass and pulmonary nodules concerning for metastatic pancreatic cancer. Oncology consulted for further recommendations.     ECOG 2  R/O metastatic pancreatic cancer  Per NCCN guidelines, systemic chemotherapy is recommended for patients with a performance status of ECOG 0-1. Given this patient's weakened and malnourished state, it is unlikely that she would tolerate systemic chemotherapy for advanced pancreatic cancer. As such, unless there is significant improvement in her performance/clinical status, the only care we could currently recommend would be supportive.   -appreciate GI recs, agree with  EUS-ERCP on Thursday/Friday to obtain tissue dx and relieve biliary obstruction   -Recommend palliative care/GOC discussion    To be discussed with Dr. Varner. 83F Cantonese speaking, PMH HTN, dementia presents with 1 week of jaundice, with findings of new large pancreatic uncinate mass and pulmonary nodules concerning for metastatic pancreatic cancer. Oncology consulted for further recommendations.     ECOG 2  R/O metastatic pancreatic cancer  Per NCCN guidelines, systemic chemotherapy is recommended for patients with a performance status of ECOG 0-1. Given this patient's weakened and malnourished state, it is unlikely that she would tolerate systemic chemotherapy for advanced pancreatic cancer. As such, unless there is significant improvement in her performance/clinical status, the only care we could currently recommend would be supportive.   -appreciate GI recs, agree with  EUS-ERCP on Thursday/Friday to obtain tissue dx and relieve biliary obstruction   -Recommend palliative care    To be discussed with Dr. Varner. 83F Cantonese speaking, PMH HTN, dementia presents with 1 week of jaundice, with findings of new large pancreatic uncinate mass and pulmonary nodules concerning for metastatic pancreatic cancer. Oncology consulted for further recommendations.     ECOG 3  R/O metastatic pancreatic cancer  Per NCCN guidelines, systemic chemotherapy is recommended for patients with a performance status of ECOG 0-1. Given this patient's weakened and malnourished state, it is unlikely that she would tolerate systemic chemotherapy for advanced pancreatic cancer. As such, unless there is significant improvement in her performance/clinical status, the only care we could currently recommend would be supportive.   -appreciate GI recs, agree with  EUS-ERCP on Thursday/Friday to obtain tissue dx and relieve biliary obstruction   -Recommend palliative care    To be discussed with Dr. Varner.

## 2023-04-26 NOTE — CONSULT NOTE ADULT - ASSESSMENT
83 female PMH HTN dementia presented to the ED with complaints of painless jaundice x1 week  Nephrology consulted for hyponatremia management.     Assessment/Plan:   #acute on chronic hyponatremia, euvolemic   asymptomatic   possibly SIADH given high Sofía/uOsm, clinical picture (pain/malignancy)  possibly tea/toast diet contributing   not on thiazide, anti-epileptics, or SSRI; benzodiazepine less likely as rare   DDx also includes hypothyroidism or adrenal insufficiency     Recommend   would continue with 1L fluid restriction  obtain TSH, FT4, AM cortisol, uric acid, serum Osm   q6H BMP, urine Na, urine osm  CXR non-contributory   goal serum -130 by noon on 4/26  encourage PO intake       Thank you for the opportunity to participate in the care of your patient. The nephrology service remains available to assist with any questions or concerns. Please feel free to reach us by paging the on-call nephrology fellow for urgent issues or as below.     Negrita Nicholas D.O  PGY-5, Nephrology Fellow   P: [phone]

## 2023-04-26 NOTE — PATIENT PROFILE ADULT - HAVE YOU RECEIVED AT LEAST TWO PFIZER AND/OR MODERNA VACCINATIONS (IN ANY COMBINATION) AND/OR ONE JOHNSON & JOHNSON VACCINATION?
Transfer for Combined Heart/Lung Transplant Eval
Detail Level: Simple
Plan: Consider laser treatments. Briefly discussed today. She can schedule if interested.\\n\\nIf for any reason Amy feels area is enlarging, becomes sensitive to touch, will plan to proceed with a biopsy to r/o BCC.  No sign of BCC seen on exam.
Yes

## 2023-04-26 NOTE — PROGRESS NOTE ADULT - PROBLEM SELECTOR PLAN 8
F: Fluid restriction.   E: K > 4, Mg > 2.   N: NPO, pending mental status improvement.   DVT px: SCDs due to low hgb.   Dispo: [facility]. F: Fluid restriction.   E: K > 4, Mg > 2.   N: NPO, pending mental status improvement.   DVT px: SCDs due to low hgb.   Dispo: [facility] > 7 [facility]

## 2023-04-26 NOTE — PROGRESS NOTE ADULT - PROBLEM SELECTOR PLAN 4
Tachcyardia to 122 and leukocytosis to 20. Empiric vancomycin and Zosyn and Fluconazole. At this time no indication to continue fluconazole. CT evidence with large necrotic mass as above and pulmonary mets, surgery consult with no concerns for perforation.     - blood cultures drawn.   - One dose of Fluconazole 500 mg IVP, Vancomycin, and Zosyn.   - Continue Zosyn 3.375 mg IV [id number]. Tachcyardia to 122 and leukocytosis to 20. S/p empiric vancomycin and Zosyn and Fluconazole in ED. At this time no indication to continue fluconazole. CT evidence with large necrotic mass as above and pulmonary mets, surgery consult with no concerns for perforation. No active signs of infection.   - Continue Zosyn 3.375 mg IV [id number] ascending cholangitis ppx

## 2023-04-26 NOTE — PROGRESS NOTE ADULT - PROBLEM SELECTOR PLAN 1
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 GI and Surgery consultation placed with plans for ERCP with CBD stenting/biopsy for diagnosis.   Tbili 11.8 (9.7 direct), transaminitis cholestatic pattern  CTAP 4/25: cystic/necrotic lesion in uncinate process of pancreas 6x10cm, CBD dilation 2.4cm, pulmonary nodules suspicious for metastasis  - GI consulted, appreciate recs  - medically optimize for EUS-ERCP w CBD stenting 4/28  - Palliative care consulted, appreciate recs    # Obstructive Jaundice  - c/w zosyn 3.375g iv q8 (ascending cholangitis ppx) Pt presenting w painless jaundice, found to have large pancreatic mass suspicious for adenocarcinoma. GI and Surgery consultation placed with plans for ERCP with CBD stenting/biopsy for diagnosis.   Tbili 11.8 (9.7 direct), transaminitis cholestatic pattern  CTAP 4/25: cystic/necrotic lesion in uncinate process of pancreas 6x10cm, CBD dilation 2.4cm, pulmonary nodules suspicious for metastasis  - GI consulted, appreciate recs  - medically optimize Na and Hb for EUS-ERCP w CBD stenting 4/28   - Palliative care consulted, appreciate recs    # Obstructive Jaundice  - c/w zosyn 3.375g iv q8 for ascending cholangitis ppx Yes

## 2023-04-26 NOTE — PROGRESS NOTE ADULT - PROBLEM SELECTOR PLAN 5
Elevated transaminases w cholestatic pattern and hyper bilirubinemia mostly direct bili, low albumin, INR 1.94. Negative gaming's sign, abd soft non tender, and she is afebrile higher concern for malignancy. CT AP showing large mass in uncinate process of pancreas w likely metastatic lesions to lungs. GI consulted and tentative plan for ERCP Thurs/Fri. Surgery consulted w concern for invasion of mass into duodenum w perf, no acute surgical intervention.    - f/u GI  - trend LE, INR  - palliative consult, further GOC discussion Elevated transaminases w cholestatic pattern and hyper bilirubinemia mostly direct bili, low albumin, INR 1.94. Negative gaming's sign, abd soft non tender, and she is afebrile higher concern for malignancy.   - administer IV vit K 10 x 1   - goal INR < 1.5 for procedure Friday

## 2023-04-26 NOTE — CONSULT NOTE ADULT - SUBJECTIVE AND OBJECTIVE BOX
Patient is a 83y old  Female who presents with a chief complaint of Anemia concern for hemolysis, SIRS (2023 19:50)      HPI:  83 female PMH HTN dementia presented to the ED with complaints of painless jaundice x1 week ; she went to her outpatient GI doctor who referred her to the ED because of jaundice ; she has been experiencing jaundice x 1 week with associated fatigue with reduced PO intake and weight loss in the ED labs notable for serum -> s/p 1L bolus NS -> 120 hgb 5.6 bicarb 19 SCr 0.57 serum osm 261 labs one month ago were normal i; no evidence of hyponatremia- nephrology consulted     PAST MEDICAL & SURGICAL HISTORY:        Allergies:  No Known Allergies      Home Medications:   piperacillin/tazobactam IVPB.. 3.375 Gram(s) IV Intermittent every 8 hours      Hospital Medications:   MEDICATIONS  (STANDING):  piperacillin/tazobactam IVPB.. 3.375 Gram(s) IV Intermittent every 8 hours      SOCIAL HISTORY:  Denies ETOh, Smoking,     Family History:  FAMILY HISTORY:        VITALS:  T(F): 98 (23 @ 06:38), Max: 99.8 (23 @ 13:55)  HR: 70 (23 @ 04:06)  BP: 125/59 (23 @ 04:06)  RR: 18 (23 @ 04:06)  SpO2: 95% (23 @ 04:06)  Wt(kg): --     @ 07:01  -   @ 07:00  --------------------------------------------------------  IN: 0 mL / OUT: 500 mL / NET: -500 mL      Height (cm): 152.4 ( @ 11:16)  Weight (kg): 44.9 ( @ 11:16)  BMI (kg/m2): 19.3 ( @ 11:16)  BSA (m2): 1.38 ( 11:16)  CAPILLARY BLOOD GLUCOSE          Review of Systems:  all other ROS negative     PHYSICAL EXAM:  GENERAL: Alert, awake, oriented x3 ; jaundice  HEENT: + scleral icterus  CHEST/LUNG: Bilateral clear breath sounds  HEART: Regular rate and rhythm, no murmur, no gallops, no rub   ABDOMEN: Soft, nontender, non distended  EXTREMITIES: no pedal edema      LABS:      127<L>  |  99  |  8   ----------------------------<  118<H>  3.7   |  17<L>  |  0.52    Ca    7.4<L>      2023 05:30  Phos  2.5       Mg     1.7         TPro  4.9<L>  /  Alb  2.3<L>  /  TBili  11.8<H>  /  DBili  9.7<H>  /  AST  141<H>  /  ALT  96<H>  /  AlkPhos  545<H>      Creatinine Trend: 0.52 <--, 0.54 <--, 0.57 <--, 0.63 <--                        8.8    18.78 )-----------( 261      ( 2023 05:30 )             26.6     Urine Studies:  Urinalysis Basic - ( 2023 15:33 )    Color: Yellow / Appearance: Clear / S.010 / pH:   Gluc:  / Ketone: NEGATIVE  / Bili: Moderate / Urobili: 0.2 E.U./dL   Blood:  / Protein: NEGATIVE mg/dL / Nitrite: NEGATIVE   Leuk Esterase: Small / RBC: < 5 /HPF / WBC 5-10 /HPF   Sq Epi:  / Non Sq Epi:  / Bacteria: Present /HPF      Osmolality, Random Urine: 414 mosm/kg ( @ 01:07)

## 2023-04-26 NOTE — PATIENT PROFILE ADULT - FALL HARM RISK - HARM RISK INTERVENTIONS

## 2023-04-26 NOTE — PROGRESS NOTE ADULT - SUBJECTIVE AND OBJECTIVE BOX
HOSPITAL COURSE:   83 year old female Cantonese speaking, history obtained from daughter, PMH HTN, dementia presents with 1 week of painless jaundice and fatigue. She originally presented to her out patient GI, Dr. Aguero, and he referred her to our ED due to jaundice. She has had reduced PO intake and unspecified amount of weight loss which the daughter contributes to not eating as much. In the ED, patient found to have Hb 5.6, Na 120, T bili 11.8 (direct), Alk Phos 669, , . 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 Admitted to [facility] for severe hyponatremia and symptomatic anemia, pt received 2 u PRBCs. Now Na self-corrected to 127 with fluid restriction only, Hb improved to 8.8. GI consulted, plan for EUS-ERCP for CBD stent placement  after Na optimized. Palliative care consulted, pending further GOC discussion. Medically stable to stepdown.    O/N Events:    Subjective/ROS: Patient seen and examined at bedside. Denies fevers, chills, HA, CP, SOB, n/v, changes in bowel/urinary habits.  12pt ROS otherwise negative.    VITALS  Vital Signs Last 24 Hrs  T(C): 36.7 (2023 09:52), Max: 37.7 (2023 13:55)  T(F): 98.1 (2023 09:52), Max: 99.8 (2023 13:55)  HR: 66 (2023 08:35) (66 - 94)  BP: 130/59 (2023 08:35) (103/60 - 134/70)  BP(mean): 85 (2023 08:35) (85 - 94)  RR: 20 (2023 08:35) (16 - 20)  SpO2: 96% (2023 08:35) (94% - 97%)    Parameters below as of 2023 08:35  Patient On (Oxygen Delivery Method): room air        CAPILLARY BLOOD GLUCOSE          PHYSICAL EXAM  General: NAD  Head: NC/AT; MMM; PERRL; EOMI;  Neck: Supple; no JVD  Respiratory: CTAB; no wheezes/rales/rhonchi  Cardiovascular: Regular rhythm/rate; S1/S2+, no murmurs, rubs gallops   Gastrointestinal: Soft; NTND; bowel sounds normal and present  Extremities: WWP; no edema/cyanosis  Neurological: A&Ox3, CNII-XII grossly intact; no obvious focal deficits    MEDICATIONS  (STANDING):  piperacillin/tazobactam IVPB.. 3.375 Gram(s) IV Intermittent every 8 hours    MEDICATIONS  (PRN):      No Known Allergies      LABS                        8.8    18.78 )-----------( 261      ( 2023 05:30 )             26.6         127<L>  |  99  |  8   ----------------------------<  118<H>  3.7   |  17<L>  |  0.52    Ca    7.4<L>      2023 05:30  Phos  2.5       Mg     1.7         TPro  4.9<L>  /  Alb  2.3<L>  /  TBili  11.8<H>  /  DBili  9.7<H>  /  AST  141<H>  /  ALT  96<H>  /  AlkPhos  545<H>      PT/INR - ( 2023 05:30 )   PT: 24.2 sec;   INR: 2.02          PTT - ( 2023 05:30 )  PTT:30.2 sec  Urinalysis Basic - ( 2023 15:33 )    Color: Yellow / Appearance: Clear / S.010 / pH: x  Gluc: x / Ketone: NEGATIVE  / Bili: Moderate / Urobili: 0.2 E.U./dL   Blood: x / Protein: NEGATIVE mg/dL / Nitrite: NEGATIVE   Leuk Esterase: Small / RBC: < 5 /HPF / WBC 5-10 /HPF   Sq Epi: x / Non Sq Epi: x / Bacteria: Present /HPF      CARDIAC MARKERS ( 2023 12:12 )  x     / x     / 43 U/L / x     / 2.2 ng/mL        Culture - Urine (collected 23 @ 15:33)  Source: Clean Catch Clean Catch (Midstream)  Preliminary Report (23 @ 09:00):    Culture in progress        IMAGING/EKG/ETC   HOSPITAL COURSE:   83 year old female Cantonese speaking, history obtained from daughter, PMH HTN, dementia presents with 1 week of painless jaundice and fatigue. She originally presented to her out patient GI, Dr. Aguero, and he referred her to our ED due to jaundice. She has had reduced PO intake and unspecified amount of weight loss which the daughter contributes to not eating as much. In the ED, patient found to have Hb 5.6, Na 120, T bili 11.8 (direct), Alk Phos 669, , . 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 Admitted to [facility] for severe hyponatremia and symptomatic anemia, pt received 2 u PRBCs. Now Na self-corrected to 127 with fluid restriction only, Hb improved to 8.8. GI consulted, plan for EUS-ERCP for CBD stent placement  after Na optimized. Palliative care consulted, pending further GOC discussion. Medically stable to stepdown.    Subjective/ROS: Patient seen and examined at bedside with use of  line. Difficulty communicating with patient but is able to nod that she is not currently in pain, ROS otherwise limited. Information obtained from [facility] team and charts.     VITALS  Vital Signs Last 24 Hrs  T(C): 36.7 (2023 09:52), Max: 37.7 (2023 13:55)  T(F): 98.1 (2023 09:52), Max: 99.8 (2023 13:55)  HR: 66 (2023 08:35) (66 - 94)  BP: 130/59 (2023 08:35) (103/60 - 134/70)  BP(mean): 85 (2023 08:35) (85 - 94)  RR: 20 (2023 08:35) (16 - 20)  SpO2: 96% (2023 08:35) (94% - 97%)    Parameters below as of 2023 08:35  Patient On (Oxygen Delivery Method): room air        CAPILLARY BLOOD GLUCOSE          PHYSICAL EXAM  General: NAD, appears comfortable   Head: Scleral icterus, no dentition   Neck: Supple; no JVD  Respiratory: CTAB; no wheezes/rales/rhonchi  Cardiovascular: Regular rhythm/rate; S1/S2+, no murmurs, rubs gallops   Gastrointestinal: Soft; NTND; bowel sounds normal and present  Extremities: WWP; no edema/cyanosis  Neurological: A&Ox1, CNII-XII grossly intact; no obvious focal deficits  Skin: Jaundiced     MEDICATIONS  (STANDING):  piperacillin/tazobactam IVPB.. 3.375 Gram(s) IV Intermittent every 8 hours    MEDICATIONS  (PRN):      No Known Allergies      LABS                        8.8    18.78 )-----------( 261      ( 2023 05:30 )             26.6         127<L>  |  99  |  8   ----------------------------<  118<H>  3.7   |  17<L>  |  0.52    Ca    7.4<L>      2023 05:30  Phos  2.5       Mg     1.7         TPro  4.9<L>  /  Alb  2.3<L>  /  TBili  11.8<H>  /  DBili  9.7<H>  /  AST  141<H>  /  ALT  96<H>  /  AlkPhos  545<H>      PT/INR - ( 2023 05:30 )   PT: 24.2 sec;   INR: 2.02          PTT - ( 2023 05:30 )  PTT:30.2 sec  Urinalysis Basic - ( 2023 15:33 )    Color: Yellow / Appearance: Clear / S.010 / pH: x  Gluc: x / Ketone: NEGATIVE  / Bili: Moderate / Urobili: 0.2 E.U./dL   Blood: x / Protein: NEGATIVE mg/dL / Nitrite: NEGATIVE   Leuk Esterase: Small / RBC: < 5 /HPF / WBC 5-10 /HPF   Sq Epi: x / Non Sq Epi: x / Bacteria: Present /HPF      CARDIAC MARKERS ( 2023 12:12 )  x     / x     / 43 U/L / x     / 2.2 ng/mL        Culture - Urine (collected 23 @ 15:33)  Source: Clean Catch Clean Catch (Midstream)  Preliminary Report (23 @ 09:00):    Culture in progress        IMAGING/EKG/ETC

## 2023-04-26 NOTE — PROGRESS NOTE ADULT - SUBJECTIVE AND OBJECTIVE BOX
IDENTIFICATION: This is a 83F with dementia and HTN presents with worsening jaundice and anemia found to have obstructive jaundice from large uncinate mass.     EVENTS:   - GI consulted and recommending ERCP Th/Fri.   - INR remains elevated.   - Na improved to 127     SUBJECTIVE:   - MInimal abdominal pain  - Denies N/V  - Normal bowel function    MEDICATIONS  (STANDING):  piperacillin/tazobactam IVPB.. 3.375 Gram(s) IV Intermittent every 8 hours    MEDICATIONS  (PRN):      Vital Signs Last 24 Hrs  T(C): 36.7 (2023 09:52), Max: 37.7 (2023 13:55)  T(F): 98.1 (2023 09:52), Max: 99.8 (2023 13:55)  HR: 66 (2023 08:35) (66 - 134)  BP: 130/59 (2023 08:35) (103/60 - 134/70)  BP(mean): 85 (2023 08:35) (85 - 94)  RR: 20 (2023 08:35) (16 - 20)  SpO2: 96% (2023 08:35) (94% - 97%)    Parameters below as of 2023 08:35  Patient On (Oxygen Delivery Method): room air        Neurologic: Sleeping but rousable. Moves ray xtremities.  CV: Normal rate, regular rhythm  Pulm: Breathing comfortably  Abd: Soft, non-distended; minimal TTP in upper abdomen.   : No Heath  Skin: No rashes  Extremities: No edema.  Psychiatric: Interacting normally.     I&O's Detail    2023 07:01  -  2023 07:00  --------------------------------------------------------  IN:  Total IN: 0 mL    OUT:    Voided (mL): 500 mL  Total OUT: 500 mL    Total NET: -500 mL          LABS:                        8.8    18.78 )-----------( 261      ( 2023 05:30 )             26.6     04-    127<L>  |  99  |  8   ----------------------------<  118<H>  3.7   |  17<L>  |  0.52    Ca    7.4<L>      2023 05:30  Phos  2.5       Mg     1.7         TPro  4.9<L>  /  Alb  2.3<L>  /  TBili  11.8<H>  /  DBili  9.7<H>  /  AST  141<H>  /  ALT  96<H>  /  AlkPhos  545<H>      PT/INR - ( 2023 05:30 )   PT: 24.2 sec;   INR: 2.02          PTT - ( 2023 05:30 )  PTT:30.2 sec  Urinalysis Basic - ( 2023 15:33 )    Color: Yellow / Appearance: Clear / S.010 / pH: x  Gluc: x / Ketone: NEGATIVE  / Bili: Moderate / Urobili: 0.2 E.U./dL   Blood: x / Protein: NEGATIVE mg/dL / Nitrite: NEGATIVE   Leuk Esterase: Small / RBC: < 5 /HPF / WBC 5-10 /HPF   Sq Epi: x / Non Sq Epi: x / Bacteria: Present /HPF        RADIOLOGY & ADDITIONAL STUDIES:

## 2023-04-26 NOTE — PROGRESS NOTE ADULT - SUBJECTIVE AND OBJECTIVE BOX
Patient is a 83y old  Female who presents with a chief complaint of Anemia concern for hemolysis, SIRS (2023 12:09)    INTERVAL EVENTS: NAEON    SUBJECTIVE:  Patient was seen and examined at bedside. baseline confusion,AAOx1 only. denies abd pain, CP,SOB,N/V,etc.     Review of systems: No fever, chills, dizziness, HA, Changes in vision, CP, dyspnea, nausea or vomiting, dysuria, changes in bowel movements, LE edema. Rest of 12 point Review of systems negative unless otherwise documented elsewhere in note.     Diet, Pureed:   1500mL Fluid Restriction ([id number]) (23 @ 11:41) [Active]      MEDICATIONS:  MEDICATIONS  (STANDING):  piperacillin/tazobactam IVPB.. 3.375 Gram(s) IV Intermittent every 8 hours    MEDICATIONS  (PRN):      Allergies    No Known Allergies    Intolerances        OBJECTIVE:  Vital Signs Last 24 Hrs  T(C): 36.7 (2023 09:52), Max: 37.7 (2023 13:55)  T(F): 98.1 (2023 09:52), Max: 99.8 (2023 13:55)  HR: 66 (2023 08:35) (66 - 94)  BP: 130/59 (2023 08:35) (103/60 - 134/70)  BP(mean): 85 (2023 08:35) (85 - 94)  RR: 20 (2023 08:35) (16 - 20)  SpO2: 96% (2023 08:35) (94% - 97%)    Parameters below as of 2023 08:35  Patient On (Oxygen Delivery Method): room air      I&O's Summary    2023 07:01  -  2023 07:00  --------------------------------------------------------  IN: 0 mL / OUT: 500 mL / NET: -500 mL        PHYSICAL EXAM:  Gen: Reclining in bed at time of exam, appears stated age, Jaundice   HEENT: NCAT, MMM, clear OP  Neck: supple, trachea at midline  CV: RRR, +S1/S2  Pulm: adequate respiratory effort, no increase in work of breathing  Abd: soft, NTND, fullness   Skin: warm and dry,   Ext: WWP, no LE edema  Neuro: AOx3, no gross focal neurological deficits  Psych: affect and behavior appropriate, pleasant at time of interview  :     LABS:                        8.8    18.78 )-----------( 261      ( 2023 05:30 )             26.6         127<L>  |  99  |  8   ----------------------------<  118<H>  3.7   |  17<L>  |  0.52    Ca    7.4<L>      2023 05:30  Phos  2.5       Mg     1.7         TPro  4.9<L>  /  Alb  2.3<L>  /  TBili  11.8<H>  /  DBili  9.7<H>  /  AST  141<H>  /  ALT  96<H>  /  AlkPhos  545<H>      LIVER FUNCTIONS - ( 2023 05:30 )  Alb: 2.3 g/dL / Pro: 4.9 g/dL / ALK PHOS: 545 U/L / ALT: 96 U/L / AST: 141 U/L / GGT: x           PT/INR - ( 2023 05:30 )   PT: 24.2 sec;   INR: 2.02          PTT - ( 2023 05:30 )  PTT:30.2 sec  CAPILLARY BLOOD GLUCOSE        Urinalysis Basic - ( 2023 15:33 )    Color: Yellow / Appearance: Clear / S.010 / pH: x  Gluc: x / Ketone: NEGATIVE  / Bili: Moderate / Urobili: 0.2 E.U./dL   Blood: x / Protein: NEGATIVE mg/dL / Nitrite: NEGATIVE   Leuk Esterase: Small / RBC: < 5 /HPF / WBC 5-10 /HPF   Sq Epi: x / Non Sq Epi: x / Bacteria: Present /HPF        MICRODATA:    Culture - Urine (collected 2023 15:33)  Source: Clean Catch Clean Catch (Midstream)  Preliminary Report (2023 09:00):    Culture in progress        RADIOLOGY/OTHER STUDIES:

## 2023-04-26 NOTE — PROGRESS NOTE ADULT - ASSESSMENT
83 y/oF  Cantonese speaking PMH Dementia,  HTN, LEW ( baseline Hgb 9.7), presented with 1 week of jaundice, fatigue and decreased po intake. Pt was seen by PMD Dr. Kary Ly on 4/24/23, blood test showed Tbili 10.8, , , , WBC 22.8K, Hgb 6, and Serum Sodium 125.Pt had normal LFT one month ago ( TBili<0.2, ALP 56, AST 18 and ALT 11).  Pt was sent to [facility]/Dr. Yonas Evans whom referred pt to the hospital for further management. CTAP reviewed w. 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    # Painless jaundice   # Necrotic mass at the uncinate process of pancreas likely advanced Pancreatic carcinoma   # Malignant CBD obstruction   # Pulm nodules likely Pulm mets   # Severe Hyponatremia   # Failure to thrive   # Dementia   # Leukocytosis   # Severe Anemia   # Coagulopathy   - Pt admitted to [address] [place] [address] for severe hyponatremia SNa+ 120-> 27 this am after 1L IVF, and s/p 2u RPBC ( 4/25) for anemia with Hgb 5.7-> 8.8 ( 4/26) , hemodynamically stable, transferred to [facility] ( 4/26)   -  INR 1.95( 4/25)> 2 ( 4/26) likely 2/2 Vit K deficiency, to give Vitamin K 10mg iv ( 4/26), repeat INR, goal <1.5 for ERCP on this Friday 4/28  - Renal consult appreciated, 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, f/u TSH and am cortisol , trend BMP goal 128-130   - fluid restriction 1.5L per day  -trend BMP goal not to exceed 6-8mmol by tomorrow am   - transfuse 2U PRBC, keep Hgb>7, anemia w/u if severe LEW, consider IV iron 300mg iv daily for 3 days (if infection ruled out)  - check DIC panel/ [facility]  - Leukocytosis: c/w Zosyn empirically ( 4/25-) for cholangitis, trend WBC ,Leukocytosis could also be 2/2 leukemoid reaction from large necrotic tumor  - trend LFT for transaminitis/obstructive pattern, TBIli 11.8, , , ALT 96  -  viral hepatitis serology negative   - GI consult appreciated, d.w GI fellow Dr. Ottoniel Nj and GI attending Dr. Darren Enriquez, plan for endoscopic procedure ERCP/possible metal stent this Friday 4/28 after optimization of medical condition   - Heme Onc consult appreciated, doubt pt would be a candidate for chemotherapy  - Palliative care consult for GOC discussion ( initiated) , likely plan for home hospice upon d/c, and to f/u code status   - Nutrition consult   - Rehab consult   - code status: FULL CODE for now   - VTE ppx; SCDs    Contact:  Elly Mares ( daughter) [phone]   PMD: Dr. Kary Ozuna [phone]  GI: Dr. Yonas Evans/[facility] [phone]    POC as d/w daughter Elly and [facility] team this am

## 2023-04-26 NOTE — PROGRESS NOTE ADULT - SUBJECTIVE AND OBJECTIVE BOX
*** Stepdown from [facility] to [facility] ***    Hospital Course: 83 yoF Cantonese speaking, history obtained from daughter, PMH HTN, dementia presents with 1 week of painless jaundice and fatigue. She originally presented to her out patient GI, Dr. Aguero, and he referred her to our ED due to jaundice. She has had reduced PO intake and unspecified amount of weight loss which the daughter contributes to not eating as much. In the ED, patient found to have Hb 5.6, Na 120, T bili 11.8 (direct), Alk Phos 669, , . 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 Admitted to tele for severe hyponatremia and symptomatic anemia, pt received 2 u PRBCs. Now Na self-corrected to 127 with fluid restriction only, Hb improved to 8.8. GI consulted, plan for EUS-ERCP for CBD stent placement  after Na optimized. Palliative care consulted, pending further GOC discussion. Medically stable to stepdown.    SUBJECTIVE:  Patient seen and examined at bedside. Patient is at baseline mental status of AAOx1, appears comfortable.     Vital Signs Last 12 Hrs  T(F): 98.1 (23 @ 09:52), Max: 98.5 (23 @ 01:02)  HR: 66 (23 @ 08:35) (66 - 78)  BP: 130/59 (23 @ 08:35) (125/59 - 130/59)  BP(mean): 85 (23 @ 08:35) (85 - 89)  RR: 20 (23 @ 08:35) (18 - 20)  SpO2: 96% (23 @ 08:35) (95% - 97%)  I&O's Summary    2023 07:01  -  2023 07:00  --------------------------------------------------------  IN: 0 mL / OUT: 500 mL / NET: -500 mL        PHYSICAL EXAM:  Constitutional: elderly female, jaundiced, NAD, comfortable in bed.  HEENT: NC/AT, EOMI, no conjunctival pallor or scleral icterus, dry MM  Neck: Supple, no JVD  Respiratory: CTA B/L. No w/r/r.   Cardiovascular: RRR, normal S1 and S2, no m/r/g.   Gastrointestinal: +BS, soft NTND, no guarding or rebound tenderness, no palpable masses   Extremities: wwp; no cyanosis, clubbing or edema.   Vascular: Pulses equal and strong throughout.   Neurological: AAOx1, no CN deficits, strength and sensation intact throughout.   Skin: jaundiced        LABS:                        8.8    18.78 )-----------( 261      ( 2023 05:30 )             26.6     04    127<L>  |  99  |  8   ----------------------------<  118<H>  3.7   |  17<L>  |  0.52    Ca    7.4<L>      2023 05:30  Phos  2.5       Mg     1.7         TPro  4.9<L>  /  Alb  2.3<L>  /  TBili  11.8<H>  /  DBili  9.7<H>  /  AST  141<H>  /  ALT  96<H>  /  AlkPhos  545<H>      PT/INR - ( 2023 05:30 )   PT: 24.2 sec;   INR: 2.02          PTT - ( 2023 05:30 )  PTT:30.2 sec  Urinalysis Basic - ( 2023 15:33 )    Color: Yellow / Appearance: Clear / S.010 / pH: x  Gluc: x / Ketone: NEGATIVE  / Bili: Moderate / Urobili: 0.2 E.U./dL   Blood: x / Protein: NEGATIVE mg/dL / Nitrite: NEGATIVE   Leuk Esterase: Small / RBC: < 5 /HPF / WBC 5-10 /HPF   Sq Epi: x / Non Sq Epi: x / Bacteria: Present /HPF          RADIOLOGY & ADDITIONAL TESTS:    MEDICATIONS  (STANDING):  piperacillin/tazobactam IVPB.. 3.375 Gram(s) IV Intermittent every 8 hours    MEDICATIONS  (PRN):

## 2023-04-26 NOTE — PROGRESS NOTE ADULT - SUBJECTIVE AND OBJECTIVE BOX
GASTROENTEROLOGY PROGRESS NOTE  Patient seen and examined at bedside. No acute events.     PERTINENT REVIEW OF SYSTEMS:  CONSTITUTIONAL: No weakness, fevers or chills  HEENT: No visual changes; No vertigo or throat pain   GASTROINTESTINAL: As above.  NEUROLOGICAL: No numbness or weakness  SKIN: No itching, burning, rashes, or lesions     Allergies    No Known Allergies    Intolerances      MEDICATIONS:  MEDICATIONS  (STANDING):  piperacillin/tazobactam IVPB.. 3.375 Gram(s) IV Intermittent every 8 hours    MEDICATIONS  (PRN):    Vital Signs Last 24 Hrs  T(C): 36.7 (2023 09:52), Max: 37.1 (2023 22:49)  T(F): 98.1 (2023 09:52), Max: 98.8 (2023 22:49)  HR: 66 (2023 08:35) (66 - 94)  BP: 130/59 (2023 08:35) (103/60 - 134/70)  BP(mean): 85 (2023 08:35) (85 - 94)  RR: 20 (2023 08:35) (16 - 20)  SpO2: 96% (2023 08:35) (94% - 97%)    Parameters below as of 2023 08:35  Patient On (Oxygen Delivery Method): room air        - @ 07:01  -   @ 07:00  --------------------------------------------------------  IN: 0 mL / OUT: 500 mL / NET: -500 mL      PHYSICAL EXAM:    General: in no acute distress  HEENT: MMM, sclera icteric  Gastrointestinal: Soft non-tender non-distended; No rebound or guarding  Skin: Warm and dry. No obvious rash    LABS:                        8.8    18.78 )-----------( 261      ( 2023 05:30 )             26.6     04    127<L>  |  99  |  8   ----------------------------<  118<H>  3.7   |  17<L>  |  0.52    Ca    7.4<L>      2023 05:30  Phos  2.5       Mg     1.7         TPro  4.9<L>  /  Alb  2.3<L>  /  TBili  11.8<H>  /  DBili  9.7<H>  /  AST  141<H>  /  ALT  96<H>  /  AlkPhos  545<H>      PT/INR - ( 2023 05:30 )   PT: 24.2 sec;   INR: 2.02          PTT - ( 2023 05:30 )  PTT:30.2 sec      Urinalysis Basic - ( 2023 15:33 )    Color: Yellow / Appearance: Clear / S.010 / pH: x  Gluc: x / Ketone: NEGATIVE  / Bili: Moderate / Urobili: 0.2 E.U./dL   Blood: x / Protein: NEGATIVE mg/dL / Nitrite: NEGATIVE   Leuk Esterase: Small / RBC: < 5 /HPF / WBC 5-10 /HPF   Sq Epi: x / Non Sq Epi: x / Bacteria: Present /HPF                Culture - Urine (collected 2023 15:33)  Source: Clean Catch Clean Catch (Midstream)  Preliminary Report (2023 09:00):    Culture in progress      RADIOLOGY & ADDITIONAL STUDIES:  Reviewed

## 2023-04-26 NOTE — PROGRESS NOTE ADULT - PROBLEM SELECTOR PLAN 5
Elevated transaminases w cholestatic pattern and hyper bilirubinemia mostly direct bili, low albumin, INR 1.94. Negative gaming's sign, abd soft non tender, and she is afebrile higher concern for malignancy.   - administer IV vit K 10 x 1   - goal INR < 1.5 for procedure Thursday

## 2023-04-26 NOTE — PROGRESS NOTE ADULT - ASSESSMENT
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.

## 2023-04-26 NOTE — PROGRESS NOTE ADULT - ASSESSMENT
This is a 83F with dementia and HTN presents with worsening jaundice and anemia found to have obstructive jaundice from large uncinate mass.     - Would treat INR with Vitamin K.   - Appreciate nephrology recommendations for management of hyponatremia.   - Continue pip-tazo  - Appreciate GI recommendations - likely ERCP Thurs/Fri.   - Hepatobiliary surgery (Surgery 1C) following closely.  This is a 83F with dementia and HTN presents with worsening jaundice and anemia found to have obstructive jaundice from large uncinate mass.     - Would treat INR with Vitamin K.   - Appreciate nephrology recommendations for management of hyponatremia.   - Continue pip-tazo  - Appreciate GI recommendations - likely ERCP Thurs/Fri.   - F/U palliative care recommendations  - Hepatobiliary surgery (Surgery 1C) following closely.

## 2023-04-26 NOTE — CONSULT NOTE ADULT - SUBJECTIVE AND OBJECTIVE BOX
Oncology Consult Note    HPI:  Consult reason: hyponatremia, anemia  ED vitals: T 98.6  -->98  RR 18 /61 SpO2 94% RA  Labs signficant: WBC 22.35, 92% neutrophils, hgb 5.6, MCV 94.6, Na 122, serum osm 261, , , , total protein 5.8, albumin 2.8, total bili 10.8, direct bili 8.2, indirect bili 2.6, lipase 12  Imaging/EKG: CXR w patchy b/l opacities, ekg w sinus tachycardia to 122  Interventions: 1 L NS, pantoprazole IV 80mg    HPI: 84 y/o Cantonese speaking, history obtained from daughter, F PMH HTN, dementia presents with 1 week of jaundice. She presented to her out patient GI, Dr. Aguero, and he referred her to our ED due to jaundice. She has had yellowing of the skin of 1 week and worsening fatigue over the same time. She usually ambulates with assistance but for the past 1 day she has not been too tired to get up. Other then fatigue the daughter notes no change in mental status. Although the patient denied pain, her daughter has seen her intermittently holding her stomach in discomfort. She has had reduced PO intake and unspecified amount of weight loss which the daughter contributes to not eating as much. She denies any black stools, BRBPR, diarrhea, vomiting, f/c, SOB, CP.    One month ago her hgb was 9.7, ALP 56, AST 18, ALT 11, total protein 6.8, Albumin 3.8, total bilirubin <0.2 (25 Apr 2023 19:50)      Allergies    No Known Allergies    Intolerances        MEDICATIONS  (STANDING):  piperacillin/tazobactam IVPB.. 3.375 Gram(s) IV Intermittent every 8 hours    MEDICATIONS  (PRN):      PAST MEDICAL & SURGICAL HISTORY:      FAMILY HISTORY:      SOCIAL HISTORY: No EtOH, no tobacco    REVIEW OF SYSTEMS:    CONSTITUTIONAL: No weakness, fevers or chills  EYES/ENT: No visual changes;  No vertigo or throat pain   NECK: No pain or stiffness  RESPIRATORY: No cough, wheezing, hemoptysis; No shortness of breath  CARDIOVASCULAR: No chest pain or palpitations  GASTROINTESTINAL: No abdominal or epigastric pain. No nausea, vomiting, or hematemesis; No diarrhea or constipation. No melena or hematochezia.  GENITOURINARY: No dysuria, frequency or hematuria  NEUROLOGICAL: No numbness or weakness  SKIN: No itching, burning, rashes, or lesions   All other review of systems is negative unless indicated above.    Height (cm): 152.4 (04-25 @ 11:16)  Weight (kg): 44.9 (04-25 @ 11:16)  BMI (kg/m2): 19.3 (04-25 @ 11:16)  BSA (m2): 1.38 (04-25 @ 11:16)    T(F): 98.1 (04-26-23 @ 09:52), Max: 99.8 (04-25-23 @ 13:55)  HR: 66 (04-26-23 @ 08:35)  BP: 130/59 (04-26-23 @ 08:35)  RR: 20 (04-26-23 @ 08:35)  SpO2: 96% (04-26-23 @ 08:35)  Wt(kg): --    GENERAL: NAD, well-developed  HEAD:  Atraumatic, Normocephalic  EYES: EOMI, PERRLA, conjunctiva and sclera clear  NECK: Supple, No JVD  CHEST/LUNG: Clear to auscultation bilaterally; No wheeze  HEART: Regular rate and rhythm; No murmurs, rubs, or gallops  ABDOMEN: Soft, Nontender, Nondistended; Bowel sounds present  EXTREMITIES:  2+ Peripheral Pulses, No clubbing, cyanosis, or edema  NEUROLOGY: non-focal  SKIN: No rashes or lesions                          8.8    18.78 )-----------( 261      ( 26 Apr 2023 05:30 )             26.6       04-26    127<L>  |  99  |  8   ----------------------------<  118<H>  3.7   |  17<L>  |  0.52    Ca    7.4<L>      26 Apr 2023 05:30  Phos  2.5     04-26  Mg     1.7     04-26    TPro  4.9<L>  /  Alb  2.3<L>  /  TBili  11.8<H>  /  DBili  9.7<H>  /  AST  141<H>  /  ALT  96<H>  /  AlkPhos  545<H>  04-26      Magnesium, Serum: 1.7 mg/dL (04-26 @ 05:30)  Phosphorus Level, Serum: 2.5 mg/dL (04-26 @ 05:30)  Haptoglobin, Serum: 112 mg/dL (04-26 @ 05:30)  Lactate Dehydrogenase, Serum: 319 U/L (04-26 @ 05:30)  Haptoglobin, Serum: 112 mg/dL (04-25 @ 23:44)  Lactate Dehydrogenase, Serum: 359 U/L (04-25 @ 23:44)  Magnesium, Serum: 1.7 mg/dL (04-25 @ 12:12)  Phosphorus Level, Serum: 2.6 mg/dL (04-25 @ 12:12)  Haptoglobin, Serum: See Note (04-25 @ 12:12)       Oncology Consult Note    HPI:  Consult reason: imaging findings suspicious for metastatic pancreatic cancer  ED vitals: T 98.6  -->98  RR 18 /61 SpO2 94% RA  Labs signficant: WBC 22.35, 92% neutrophils, hgb 5.6, MCV 94.6, Na 122, serum osm 261, , , , total protein 5.8, albumin 2.8, total bili 10.8, direct bili 8.2, indirect bili 2.6, lipase 12  Imaging/EKG: CXR w patchy b/l opacities, ekg w sinus tachycardia to 122  Interventions: 1 L NS, pantoprazole IV 80mg    HPI: 82 y/o Cantonese speaking, history obtained from daughter, F PMH HTN, dementia presents with 1 week of jaundice. She presented to her out patient GI, Dr. Aguero, and he referred her to our ED due to jaundice. She has had yellowing of the skin of 1 week and worsening fatigue over the same time. She usually ambulates with assistance but for the past 1 day she has not been too tired to get up. Other then fatigue the daughter notes no change in mental status. Although the patient denied pain, her daughter has seen her intermittently holding her stomach in discomfort. She has had reduced PO intake and unspecified amount of weight loss which the daughter contributes to not eating as much. She denies any black stools, BRBPR, diarrhea, vomiting, f/c, SOB, CP.    One month ago her hgb was 9.7, ALP 56, AST 18, ALT 11, total protein 6.8, Albumin 3.8, total bilirubin <0.2 (25 Apr 2023 19:50)      Allergies    No Known Allergies    Intolerances        MEDICATIONS  (STANDING):  piperacillin/tazobactam IVPB.. 3.375 Gram(s) IV Intermittent every 8 hours    MEDICATIONS  (PRN):      PAST MEDICAL & SURGICAL HISTORY:      FAMILY HISTORY:      SOCIAL HISTORY: No EtOH, no tobacco    REVIEW OF SYSTEMS:    CONSTITUTIONAL: No weakness, fevers or chills  EYES/ENT: No visual changes;  No vertigo or throat pain   NECK: No pain or stiffness  RESPIRATORY: No cough, wheezing, hemoptysis; No shortness of breath  CARDIOVASCULAR: No chest pain or palpitations  GASTROINTESTINAL: No abdominal or epigastric pain. No nausea, vomiting, or hematemesis; No diarrhea or constipation. No melena or hematochezia.  GENITOURINARY: No dysuria, frequency or hematuria  NEUROLOGICAL: No numbness or weakness  SKIN: No itching, burning, rashes, or lesions   All other review of systems is negative unless indicated above.    Height (cm): 152.4 (04-25 @ 11:16)  Weight (kg): 44.9 (04-25 @ 11:16)  BMI (kg/m2): 19.3 (04-25 @ 11:16)  BSA (m2): 1.38 (04-25 @ 11:16)    T(F): 98.1 (04-26-23 @ 09:52), Max: 99.8 (04-25-23 @ 13:55)  HR: 66 (04-26-23 @ 08:35)  BP: 130/59 (04-26-23 @ 08:35)  RR: 20 (04-26-23 @ 08:35)  SpO2: 96% (04-26-23 @ 08:35)  Wt(kg): --    GENERAL: NAD, well-developed  HEAD:  Atraumatic, Normocephalic  EYES: EOMI, PERRLA, conjunctiva and sclera clear  NECK: Supple, No JVD  CHEST/LUNG: Clear to auscultation bilaterally; No wheeze  HEART: Regular rate and rhythm; No murmurs, rubs, or gallops  ABDOMEN: Soft, Nontender, Nondistended; Bowel sounds present  EXTREMITIES:  2+ Peripheral Pulses, No clubbing, cyanosis, or edema  NEUROLOGY: non-focal  SKIN: No rashes or lesions                          8.8    18.78 )-----------( 261      ( 26 Apr 2023 05:30 )             26.6       04-26    127<L>  |  99  |  8   ----------------------------<  118<H>  3.7   |  17<L>  |  0.52    Ca    7.4<L>      26 Apr 2023 05:30  Phos  2.5     04-26  Mg     1.7     04-26    TPro  4.9<L>  /  Alb  2.3<L>  /  TBili  11.8<H>  /  DBili  9.7<H>  /  AST  141<H>  /  ALT  96<H>  /  AlkPhos  545<H>  04-26      Magnesium, Serum: 1.7 mg/dL (04-26 @ 05:30)  Phosphorus Level, Serum: 2.5 mg/dL (04-26 @ 05:30)  Haptoglobin, Serum: 112 mg/dL (04-26 @ 05:30)  Lactate Dehydrogenase, Serum: 319 U/L (04-26 @ 05:30)  Haptoglobin, Serum: 112 mg/dL (04-25 @ 23:44)  Lactate Dehydrogenase, Serum: 359 U/L (04-25 @ 23:44)  Magnesium, Serum: 1.7 mg/dL (04-25 @ 12:12)  Phosphorus Level, Serum: 2.6 mg/dL (04-25 @ 12:12)  Haptoglobin, Serum: See Note (04-25 @ 12:12)      CT Chest, Abdomen and Pelvis w/ IV Cont (04.25.23 @ 14:31)   FINDINGS:  CHEST:  LUNGS AND LARGE AIRWAYS: Exam is somewhat degraded by breathing motion   artifact. Multiple varied sized pulmonary nodules are noted bilaterally.   These are compatible with metastasis. Right basilar focal atelectasis.   PLEURA: No pleural effusion.  VESSELS: Within normal limits.  HEART: Heart size is normal. No pericardial effusion.  MEDIASTINUM AND MARKO: Solitary, mildly enlarged right precarinal lymph   node measuring 1.2 cm.  CHEST WALL AND LOWER NECK: Within normal limits.    ABDOMEN AND PELVIS:  LIVER: No hepatic lesions  BILE DUCTS: Diffuse intrahepatic and extrahepatic biliary ductal   dilatation. Common bile duct measures up to 2.4 cm (6:38)  GALLBLADDER: Within normal limits.  SPLEEN: Within normal limits.  PANCREAS: A large, solid and cystic, partially necrotic lesion is seen in   the region of the uncinate process of the pancreas, measuring   approximately 5.5 x 9.5 x 5.8 cm (TV by AP by CC). Diffuse pancreatic   ductal dilatation. The pancreatic duct in the head of the pancreas is   somewhat ill-defined (6:30, 31). It appears that the lesion has eroded   into the lumen of the second/third portion of the duodenum as there is   fluid and necrotic debris within the bed of the lesion which may be   connected to the duodenal lumen. The portal vein is widely patent. Normal   branch pattern anatomy of the celiac axis with common hepatic artery   originating from the celiac axis. No distortion of the superior   mesenteric vein or superior mesenteric artery.  ADRENALS: Within normal limits.  KIDNEYS/URETERS: Within normal limits.    BLADDER: Within normal limits.  REPRODUCTIVE ORGANS: Macrocalcifications in the uterus, compatible with a   fibroid. No adnexal masses.    BOWEL: No bowel obstruction.  PERITONEUM: No ascites. There is no evidence of peritoneal metastasis.  VESSELS: Within normal limits.  RETROPERITONEUM/LYMPH NODES: No lymphadenopathy.  ABDOMINAL WALL: Within normal limits.  BONES: No suspicious osseous lesions    IMPRESSION:    1. 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 Diffuse pancreatic   ductal dilatation. The lesion appears to erode into the second/third   portion of the duodenum with resultant possible duodenal perforation.  2. Multiple pulmonary nodules, compatible with metastasis.       Oncology Consult Note    HPI:  Consult reason: imaging findings suspicious for metastatic pancreatic cancer  ED vitals: T 98.6  -->98  RR 18 /61 SpO2 94% RA  Labs signficant: WBC 22.35, 92% neutrophils, hgb 5.6, MCV 94.6, Na 122, serum osm 261, , , , total protein 5.8, albumin 2.8, total bili 10.8, direct bili 8.2, indirect bili 2.6, lipase 12  Imaging/EKG: CXR w patchy b/l opacities, ekg w sinus tachycardia to 122  Interventions: 1 L NS, pantoprazole IV 80mg    HPI: 82 y/o Cantonese speaking, history obtained from daughter, F PMH HTN, dementia presents with 1 week of jaundice. She presented to her out patient GI, Dr. Aguero, and he referred her to our ED due to jaundice. She has had yellowing of the skin of 1 week and worsening fatigue over the same time. She usually ambulates with assistance but for the past 1 day she has not been too tired to get up. Other then fatigue the daughter notes no change in mental status. Although the patient denied pain, her daughter has seen her intermittently holding her stomach in discomfort. She has had reduced PO intake and unspecified amount of weight loss which the daughter contributes to not eating as much. She denies any black stools, BRBPR, diarrhea, vomiting, f/c, SOB, CP.    One month ago her hgb was 9.7, ALP 56, AST 18, ALT 11, total protein 6.8, Albumin 3.8, total bilirubin <0.2 (25 Apr 2023 19:50)      Allergies    No Known Allergies    Intolerances        MEDICATIONS  (STANDING):  piperacillin/tazobactam IVPB.. 3.375 Gram(s) IV Intermittent every 8 hours    MEDICATIONS  (PRN):      PAST MEDICAL & SURGICAL HISTORY:      FAMILY HISTORY:      SOCIAL HISTORY: No EtOH, no tobacco    REVIEW OF SYSTEMS:  (patient not participating in interview when seen, daughter was not at bedside)  CONSTITUTIONAL: No weakness, fevers or chills  EYES/ENT: No visual changes;  No vertigo or throat pain   NECK: No pain or stiffness  RESPIRATORY: No cough, wheezing, hemoptysis; No shortness of breath  CARDIOVASCULAR: No chest pain or palpitations  GASTROINTESTINAL: No abdominal or epigastric pain. No nausea, vomiting, or hematemesis; No diarrhea or constipation. No melena or hematochezia.  GENITOURINARY: No dysuria, frequency or hematuria  NEUROLOGICAL: No numbness or weakness  SKIN: No itching, burning, rashes, or lesions   All other review of systems is negative unless indicated above.    Height (cm): 152.4 (04-25 @ 11:16)  Weight (kg): 44.9 (04-25 @ 11:16)  BMI (kg/m2): 19.3 (04-25 @ 11:16)  BSA (m2): 1.38 (04-25 @ 11:16)    T(F): 98.1 (04-26-23 @ 09:52), Max: 99.8 (04-25-23 @ 13:55)  HR: 66 (04-26-23 @ 08:35)  BP: 130/59 (04-26-23 @ 08:35)  RR: 20 (04-26-23 @ 08:35)  SpO2: 96% (04-26-23 @ 08:35)  Wt(kg): --    GENERAL: NAD, frail elderly woman, +jaundice  HEAD:  Atraumatic, Normocephalic  EYES: EOMI, PERRLA,   NECK: Supple, No JVD  CHEST/LUNG: Clear to auscultation bilaterally; No wheeze  HEART: Regular rate and rhythm; No murmurs, rubs, or gallops  ABDOMEN: Soft, NT, ND; Bowel sounds present  EXTREMITIES:  2+ Peripheral Pulses, No clubbing, cyanosis, or edema  NEUROLOGY: alert and awake, but not communicative  SKIN: No rashes or lesions                          8.8    18.78 )-----------( 261      ( 26 Apr 2023 05:30 )             26.6       04-26    127<L>  |  99  |  8   ----------------------------<  118<H>  3.7   |  17<L>  |  0.52    Ca    7.4<L>      26 Apr 2023 05:30  Phos  2.5     04-26  Mg     1.7     04-26    TPro  4.9<L>  /  Alb  2.3<L>  /  TBili  11.8<H>  /  DBili  9.7<H>  /  AST  141<H>  /  ALT  96<H>  /  AlkPhos  545<H>  04-26      Magnesium, Serum: 1.7 mg/dL (04-26 @ 05:30)  Phosphorus Level, Serum: 2.5 mg/dL (04-26 @ 05:30)  Haptoglobin, Serum: 112 mg/dL (04-26 @ 05:30)  Lactate Dehydrogenase, Serum: 319 U/L (04-26 @ 05:30)  Haptoglobin, Serum: 112 mg/dL (04-25 @ 23:44)  Lactate Dehydrogenase, Serum: 359 U/L (04-25 @ 23:44)  Magnesium, Serum: 1.7 mg/dL (04-25 @ 12:12)  Phosphorus Level, Serum: 2.6 mg/dL (04-25 @ 12:12)  Haptoglobin, Serum: See Note (04-25 @ 12:12)      CT Chest, Abdomen and Pelvis w/ IV Cont (04.25.23 @ 14:31)   FINDINGS:  CHEST:  LUNGS AND LARGE AIRWAYS: Exam is somewhat degraded by breathing motion   artifact. Multiple varied sized pulmonary nodules are noted bilaterally.   These are compatible with metastasis. Right basilar focal atelectasis.   PLEURA: No pleural effusion.  VESSELS: Within normal limits.  HEART: Heart size is normal. No pericardial effusion.  MEDIASTINUM AND MARKO: Solitary, mildly enlarged right precarinal lymph   node measuring 1.2 cm.  CHEST WALL AND LOWER NECK: Within normal limits.    ABDOMEN AND PELVIS:  LIVER: No hepatic lesions  BILE DUCTS: Diffuse intrahepatic and extrahepatic biliary ductal   dilatation. Common bile duct measures up to 2.4 cm (6:38)  GALLBLADDER: Within normal limits.  SPLEEN: Within normal limits.  PANCREAS: A large, solid and cystic, partially necrotic lesion is seen in   the region of the uncinate process of the pancreas, measuring   approximately 5.5 x 9.5 x 5.8 cm (TV by AP by CC). Diffuse pancreatic   ductal dilatation. The pancreatic duct in the head of the pancreas is   somewhat ill-defined (6:30, 31). It appears that the lesion has eroded   into the lumen of the second/third portion of the duodenum as there is   fluid and necrotic debris within the bed of the lesion which may be   connected to the duodenal lumen. The portal vein is widely patent. Normal   branch pattern anatomy of the celiac axis with common hepatic artery   originating from the celiac axis. No distortion of the superior   mesenteric vein or superior mesenteric artery.  ADRENALS: Within normal limits.  KIDNEYS/URETERS: Within normal limits.    BLADDER: Within normal limits.  REPRODUCTIVE ORGANS: Macrocalcifications in the uterus, compatible with a   fibroid. No adnexal masses.    BOWEL: No bowel obstruction.  PERITONEUM: No ascites. There is no evidence of peritoneal metastasis.  VESSELS: Within normal limits.  RETROPERITONEUM/LYMPH NODES: No lymphadenopathy.  ABDOMINAL WALL: Within normal limits.  BONES: No suspicious osseous lesions    IMPRESSION:    1. 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 Diffuse pancreatic   ductal dilatation. The lesion appears to erode into the second/third   portion of the duodenum with resultant possible duodenal perforation.  2. Multiple pulmonary nodules, compatible with metastasis.

## 2023-04-26 NOTE — PROGRESS NOTE ADULT - PROBLEM SELECTOR PLAN 1
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 GI and Surgery consultation placed with plans for ERCP with CBD stenting/biopsy for diagnosis.   Tbili 11.8 (9.7 direct), transaminitis cholestatic pattern  CTAP 4/25: cystic/necrotic lesion in uncinate process of pancreas 6x10cm, CBD dilation 2.4cm, pulmonary nodules suspicious for metastasis  - GI consulted, appreciate recs  - EUS-ERCP w CBD stenting 4/27  - NPO at midnight   - ongoing discussions about code status, will be discharged to home hospice  - Palliative care consulted, appreciate recs    # Obstructive Jaundice  - c/w zosyn 3.375g iv q8 for ascending cholangitis ppx

## 2023-04-26 NOTE — PROGRESS NOTE ADULT - PROBLEM SELECTOR PLAN 7
- c/w home memantine 5mg bid and donepezil 10mh qhs and paroxetine 20mg qd  - monitor for changes in mental status given transaminitis and hyponatremia (plan below)

## 2023-04-26 NOTE — PROGRESS NOTE ADULT - PROBLEM SELECTOR PLAN 3
Assume chronic hyponatremia, though mild pretibial edema, does not appear fluid overloaded. 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 Renal consulted. Serum osm 264.   - nephrology consulted, appreciate recs  - Keep fluid restriction to <1L.   - monitor BMP bid, next BMP @6 pm  - AM cortisol, uric acid  - goal serum Na 128-130 by noon 4/26

## 2023-04-26 NOTE — PROGRESS NOTE ADULT - PROBLEM SELECTOR PLAN 8
F: Fluid restriction.   E: K > 4, Mg > 2.   N: NPO, pending mental status improvement.   DVT px: SCDs due to low hgb.   Dispo: 7 lachman > 7 zachery

## 2023-04-26 NOTE — PROGRESS NOTE ADULT - ASSESSMENT
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

## 2023-04-26 NOTE — PATIENT PROFILE ADULT - FUNCTIONAL SCREEN CURRENT LEVEL: COMMUNICATION, MLM
Spoke with mom who accepted in person appointment on 02/24/2022 at 12:30 PM. PCP office was informed.   2 = difficulty speaking (not related to language barrier)

## 2023-04-27 NOTE — DISCHARGE NOTE PROVIDER - HOSPITAL COURSE
Due to the patient's medical condition, she would greatly benefit from increased home care hours. She will need 24 hour care. 82 y/o Cantonese speaking, history obtained from daughter, F PMH HTN, dementia presents with 1 week of jaundice. She presented to her out patient GI, Dr. Aguero, and he referred her to our ED due to jaundice. She has had yellowing of the skin of 1 week and worsening fatigue over the same time. She usually ambulates with assistance but for the past 1 day she has not been too tired to get up. Other then fatigue the daughter notes no change in mental status. She has had reduced PO intake and unspecified amount of weight loss which the daughter contributes to not eating as much. CT showing pancreatic mass now s/p EUS guided Bx and attempted ERCP ( 4/27) : found Gwendolyn Ruano tear and DU, fungating and ulcerated mass ~ 6cm, not amenable for biliary stent, pending choleocuduodesntosomy with GI. Initially started on zosyn 4/25-4/28 changed to meropenem 1g q8hr on day 5 for presumed cholangitis. Leukocytosis on admission 22 initially improved to 14 by 4/29 however now rising back to 21.      Problem: Pancreatic mass.   ·  Plan: Pt presenting w painless jaundice, found to have large pancreatic mass suspicious for adenocarcinoma. GI and Surgery consultation placed with plans for ERCP with CBD stenting/biopsy for diagnosis.   CTAP 4/25: cystic/necrotic lesion in uncinate process of pancreas 6x10cm, CBD dilation 2.4cm, pulmonary nodules suspicious for metastasis.   Patient now s/p EUS-ERCP on 4/27 for biliary stent, unable to place due to mass morphology now pending bx reports for possible choledocoduodenostomy early next week.     Plan:   - advanced endoscopy procedure tomorrow   - further GOC conversations for outpatient planning upon discharge   - monitor for post ERCP complications (CBC, pain control/monitoring)   - Patient is DNR/DNI (Living will scanned into chart)     # Obstructive Jaundice  - patient with new fever, now on meropenem and vanocymcin - can likely dc tomorrow, no more fevers.     Problem/Plan - 2:  ·  Problem: Acute anemia.   ·  Plan: Normocytic anemia w hgb 5.7 (down from 9.7 1 month ago on ferrous sulfate PO 325mg daily). S/p 2 units PRBC transfusion with improvement in hemoglobin. LDH and hapto wnl. Borderline iron deficiency noted on lab work.     - Maintain active T&S.   - Transfuse <7.     Problem/Plan - 3:  ·  Problem: Hyponatremia.   ·  Plan: Assume chronic hyponatremia, though mild pretibial edema, does not appear fluid overloaded. 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 AM cortisol and TSH wnl.     Plan:   - Keep fluid restriction to <1L.   - changed medication carrier to NS.     Problem/Plan - 4:  ·  Problem: Transaminitis.   ·  Plan: Elevated transaminases w cholestatic pattern and hyper bilirubinemia mostly direct bili, low albumin, INR 1.94. Negative gaming's sign, abd soft non tender, and she is afebrile higher concern for malignancy.    Results are lateral with cholestatic picture 2/2 pancreatic mass. Do not expect many changes until definitive intervention. Will continue to monitor for now.     Bili up to 14.3 - no pruritus.     Problem/Plan - 5:  ·  Problem: Lesion of lung.   ·  Plan: Likely metastatic lesions, on 3 L 02. Wean as tolerated.     Problem/Plan - 6:  ·  Problem: Dementia.   ·  Plan: - c/w home memantine 5mg bid and donepezil 10mh qhs and paroxetine 20mg qd  - monitor for changes in mental status given transaminitis and hyponatremia (plan below).    Due to the patient's medical condition, she would greatly benefit from increased home care hours. She will need 24 hour care.     Physical exam at discharge:  PHYSICAL EXAM:  Constitutional: elderly female, jaundiced, NAD, comfortable in bed.  HEENT: NC/AT, EOMI, scleral icterus+, dry MM  Neck: Supple, no JVD  Respiratory: CTA B/L. No w/r/r.   Cardiovascular: RRR, normal S1 and S2, no m/r/g.   Gastrointestinal: +BS, soft NTND, no guarding or rebound tenderness, no palpable masses   Extremities: wwp; no cyanosis, clubbing or edema.   Vascular: Pulses equal and strong throughout.   Neurological: AAOx1, no CN deficits  Skin: jaundiced    New medications on discharge:  Labs to be followed up:  Imaging to be done as outpatient:  Further outpatient workup:

## 2023-04-27 NOTE — CHART NOTE - NSCHARTNOTEFT_GEN_A_CORE
Patient is s/p [id number] with Dr. Wilson in the Endoscopy Suite with the following findings and recommendations.     Impressions:  - The procedure was started with a gastroscope. A small, linear Gwendolyn-Ruano tear was visualized in the distal esophagus. There was also some liquefied retained gastric contents that were suctioned out. Upon entering the duodenum, there was a clean based ulcer visualized at the sweep. Beyond the sweep, there was a nearly circumferential, fungating, ulcerated mass with spontaneous bleeding. Multiple biopsies were obtained with a jumbo forceps. Subsequently, the gastroscope was removed and a duodenoscope was passed into the duodenum. Given that the mass had invaded and eroded through the duodenum, the anatomy was skewed and the papilla could not be visualized precluding an ERCP. Thus, the duodenoscope was removed and the linear EUS scope was passed into the duodenum. The mass was visualized and appeared to be heterogeneous and approximately 60 x 60 mm in size. Multiple passes with the 19G Acquire needle were obtained. The bile duct was also visualized and was dilated to approximately 25 mm in size.     Recommendations:  - Clear Liquid Diet  - Await pathology results.  - Protonix 40 mg IV q 12 h  - Return to floor for further management  - Will consider for choledochoduodenostomy following pathology results    - No obvious GOO, but will review imaging and symptoms in consideration of endoscopic GJ    Ottoniel Nj MD  PGY-6, Gastroenterology Fellow  pager: [phone]

## 2023-04-27 NOTE — DIETITIAN INITIAL EVALUATION ADULT - OTHER INFO
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       Pt seen for nutrition assessment, spoke with daughter at bedside. Pt experiencing decreased PO intake since February, daughter states she has only been able to eat soft foods/liquids d/t dentures being taken out. Of note, daughter also thinks pt may have swallowing difficulty as well, has not been seen by SLP. Pt s/p [id number] 4/27, now ordered for clear liquids + 1500ml fluid restriction. Recommend SLP eval to determine safest diet. Per daughter, pt also with wt loss unable to specify amount. Per ASPEN guidelines, pt meets criteria for severe protein-calorie malnutrition 2/2 likely meeting <75% EER for >1 month, reported wt loss. Palliative consulted for GOC, will monitor and align nutrition with GOC at all times. Please see full nutrition recommendations below. Will continue to follow per RD protocol.

## 2023-04-27 NOTE — DIETITIAN INITIAL EVALUATION ADULT - PROBLEM SELECTOR PLAN 2
Normocytic anemia w hgb 5.7 (down from 9.7 1 month ago on ferrous sulfate PO 325mg daily). Given absolute reticulocyte count of 4.6, elevated indirect bilirubin (though could also be due to intrahepatic process) and no signs of GI blood loss there is concern for hemolysis. 2 units PRBC transfusion with improvement in hemoglobin. Haptoglobin noted to be WNL. Borderline iron deficiency noted on lab work.     - F/u folate  - Maintain active T&S.   - Transfuse <7  - Follow up for signs of bleeding.

## 2023-04-27 NOTE — PROGRESS NOTE ADULT - ASSESSMENT
83F Cantonese speaking, [facility] HTN, dementia presents with 1 week of jaundice, with findings of new large pancreatic uncinate mass and pulmonary nodules concerning for metastatic pancreatic cancer. Oncology consulted for further recommendations.     ECOG 3  R/O metastatic pancreatic cancer  High suspicion for advanced pancreatic cancer, although not yet biopsy proven, with pathology results pending. Also on the differential although less likely are pancreatic neuroendocrine tumors, lymphoma.  Per NCCN guidelines, systemic chemotherapy is recommended for patients with a performance status of ECOG 0-1. Given this patient's weakened and malnourished state, it is unlikely that she would tolerate systemic chemotherapy for advanced pancreatic cancer. As such, unless there is significant improvement in her performance/clinical status, the only care we could currently recommend would be supportive. Had an extensive discussion with patient's daughter about prognosis.   -EGD, EUS, ERCP with biopsies today 4/27/23, will follow up pathology  -Recommend palliative care  -please check CA 19-9 and CEA with am labs    To be discussed with Dr. Varner.

## 2023-04-27 NOTE — DIETITIAN INITIAL EVALUATION ADULT - PROBLEM SELECTOR PLAN 5
Elevated transaminases w cholestatic pattern and hyper bilirubinemia mostly direct bili, low albumin, INR 1.94. Negative gaming's sign, abd soft non tender, and she is afebrile higher concern for malignancy. CT AP showing large mass in uncinate process of pancreas w likely metastatic lesions to lungs. GI consulted and tentative plan for ERCP Thurs/Fri. Surgery consulted w concern for invasion of mass into duodenum w perf, no acute surgical intervention.    - f/u GI  - trend LE, INR  - palliative consult, further GOC discussion

## 2023-04-27 NOTE — PROGRESS NOTE ADULT - ASSESSMENT
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 s/p attempted biliary stent placement without success, pending surgical biopsy report for possible choledochoduodenostomy.

## 2023-04-27 NOTE — DIETITIAN INITIAL EVALUATION ADULT - PROBLEM SELECTOR PLAN 3
Assume chronic hyponatremia, though pretibial edema, does not appear fluid overloaded. 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 Renal consulted.    - NPO for now, keep fluid restriction to <1L.   - f/u Urine lytes  - Q6 BMP  - f/u nephrology recommendations.   - F/u Cortisol and TSH

## 2023-04-27 NOTE — DIETITIAN INITIAL EVALUATION ADULT - ADD RECOMMEND
1. Continue clear liquids+ fluids per team  2. When medically able for diet advancement, recommend SLP eval to determine safest diet  3. Add Ensure Max BID (300kcal/60gpro)   4. Align nutrition with GOC at all times

## 2023-04-27 NOTE — DIETITIAN INITIAL EVALUATION ADULT - PROBLEM SELECTOR PLAN 1
Pancreatic mass leading to obstructive juandice; noted on CT scan today. GI and Surgery consultation placed with plans for possible ERCP with stenting/biopsy of mass for diagnosis if that is within goals of care. No acute surgical interventions planned. ON CT large necrotic mass with upstream dilation of PD and CBD and imaging also suggests pulmonary metastases.     - Follow up GI recommendations with plans for ERCP.  - Palliative care consultation.

## 2023-04-27 NOTE — PROGRESS NOTE ADULT - ASSESSMENT
This is a 83F with dementia and HTN presents with worsening jaundice and anemia found to have obstructive jaundice from large uncinate mass.     - F/U labs.   - Appreciate nephrology recommendations for management of hyponatremia.   - Continue pip-tazo  - Appreciate GI recommendations - likely ERCP Today.  - F/U palliative care recommendations  - Hepatobiliary surgery (Surgery 1C) following closely.      Not applicable

## 2023-04-27 NOTE — PROGRESS NOTE ADULT - SUBJECTIVE AND OBJECTIVE BOX
Patient is a 83y Female seen and evaluated at bedside. No acute distress, sNA at 124 today, VSS.       Meds:    pantoprazole  Injectable 40 once  pantoprazole  Injectable 40 every 12 hours  piperacillin/tazobactam IVPB.. 3.375 every 8 hours  potassium chloride   Powder 40 once      T(C): , Max: 36.8 (23 @ 06:06)  T(F): , Max: 98.2 (23 @ 06:06)  HR: 84 (23 @ 11:48)  BP: 106/67 (23 @ 11:48)  BP(mean): 85 (23 @ 08:37)  RR: 18 (23 @ 11:48)  SpO2: 93% (23 11:48)  Wt(kg): --     @ 07:01  -   @ 07:00  --------------------------------------------------------  IN: 40 mL / OUT: 300 mL / NET: -260 mL      Height (cm): 152.4 ( @ 08:37)  Weight (kg): 44.9 ( @ 08:37)  BMI (kg/m2): 19.3 ( @ 08:37)  BSA (m2): 1.38 ( @ 08:37)    Review of Systems:  ROS negative except as per HPI      PHYSICAL EXAM:  GENERAL: Alert, awake, oriented x3 ; jaundice  HEENT: + scleral icterus  CHEST/LUNG: Bilateral clear breath sounds  HEART: Regular rate and rhythm, no murmur, no gallops, no rub   ABDOMEN: Soft, nontender, non distended  EXTREMITIES: no pedal edema    LABS:                        9.1    18.68 )-----------( 246      ( 2023 05:30 )             27.6         125<L>  |  96  |  10  ----------------------------<  132<H>  3.4<L>   |  20<L>  |  0.52    Ca    8.3<L>      2023 05:30  Phos  3.1       Mg     1.9         TPro  5.6<L>  /  Alb  2.3<L>  /  TBili  14.3<H>  /  DBili  x   /  AST  140<H>  /  ALT  98<H>  /  AlkPhos  625<H>      Uric Acid, Serum: 1.4 mg/dL *L* [2.5 - 7.0] ( @ 15:36)    PT/INR - ( 2023 05:30 )   PT: 14.3 sec;   INR: 1.20          PTT - ( 2023 05:30 )  PTT:26.2 sec  Urinalysis Basic - ( 2023 15:33 )    Color: Yellow / Appearance: Clear / S.010 / pH: x  Gluc: x / Ketone: NEGATIVE  / Bili: Moderate / Urobili: 0.2 E.U./dL   Blood: x / Protein: NEGATIVE mg/dL / Nitrite: NEGATIVE   Leuk Esterase: Small / RBC: < 5 /HPF / WBC 5-10 /HPF   Sq Epi: x / Non Sq Epi: x / Bacteria: Present /HPF      Osmolality, Random Urine: 414 mosm/kg ( @ 01:07)        RADIOLOGY & ADDITIONAL STUDIES:

## 2023-04-27 NOTE — PROGRESS NOTE ADULT - SUBJECTIVE AND OBJECTIVE BOX
INTERVAL HPI/OVERNIGHT EVENTS: Pt is s/p [id number] and biopsy today.    SUBJECTIVE: Patient seen and examined at bedside. Spoke at length with daughter, Elly Mares. Per daughter, the patient can ambulate with assistance but has been very withdrawn, lethargic, and has been in this state of decline since February 2022, after catching COVID, and has had worsening PO intake. Over the last week, she hasn't been getting out of bed Per daughter, she sees her mother elidia sometimes, but adamantly denies pain.     VITAL SIGNS:  ICU Vital Signs Last 24 Hrs  T(C): 36.8 (27 Apr 2023 11:48), Max: 36.8 (27 Apr 2023 06:06)  T(F): 98.2 (27 Apr 2023 11:48), Max: 98.2 (27 Apr 2023 06:06)  HR: 84 (27 Apr 2023 11:48) (80 - 84)  BP: 106/67 (27 Apr 2023 11:48) (106/67 - 128/74)  BP(mean): 85 (27 Apr 2023 08:37) (85 - 85)  ABP: --  ABP(mean): --  RR: 18 (27 Apr 2023 11:48) (17 - 18)  SpO2: 93% (27 Apr 2023 11:48) (93% - 96%)    O2 Parameters below as of 27 Apr 2023 11:48  Patient On (Oxygen Delivery Method): room air              04-26 @ 07:01  -  04-27 @ 07:00  --------------------------------------------------------  IN: 40 mL / OUT: 300 mL / NET: -260 mL      CAPILLARY BLOOD GLUCOSE          PHYSICAL EXAM:    Constitutional: NAD, jaundiced elderly woman lying in bed  HEENT: NC/AT; PERRL, anicteric sclera; MMM, edentulous  Neck: supple, no JVD  Cardiovascular: +S1/S2, RRR  Respiratory: CTA B/L, no W/R/R  Gastrointestinal: abdomen soft, NT/ND; no rebound or guarding; +BSx4  Genitourinary: no suprapubic tenderness or fullness  Extremities: WWP; no LE edema; no clubbing or cyanosis  Vascular: 2+ radial, DP/PT pulses B/L  Dermatologic: normal color and turgor; no visible rashes  Neurological: nonfocal, alert and awake but non-conversant    MEDICATIONS:  MEDICATIONS  (STANDING):  pantoprazole  Injectable 40 milliGRAM(s) IV Push every 12 hours  piperacillin/tazobactam IVPB.. 3.375 Gram(s) IV Intermittent every 8 hours    MEDICATIONS  (PRN):      ALLERGIES:  Allergies    No Known Allergies    Intolerances        LABS:                        9.1    18.68 )-----------( 246      ( 27 Apr 2023 05:30 )             27.6     04-27    129<L>  |  98  |  12  ----------------------------<  126<H>  3.3<L>   |  20<L>  |  0.55    Ca    8.5      27 Apr 2023 16:57  Phos  3.1     04-27  Mg     1.9     04-27    TPro  5.6<L>  /  Alb  2.3<L>  /  TBili  14.3<H>  /  DBili  x   /  AST  140<H>  /  ALT  98<H>  /  AlkPhos  625<H>  04-27    PT/INR - ( 27 Apr 2023 05:30 )   PT: 14.3 sec;   INR: 1.20          PTT - ( 27 Apr 2023 05:30 )  PTT:26.2 sec      RADIOLOGY & ADDITIONAL TESTS: Reviewed.

## 2023-04-27 NOTE — PHYSICAL THERAPY INITIAL EVALUATION ADULT - PLANNED THERAPY INTERVENTIONS, PT EVAL
balance training/bed mobility training/gait training/prosthetic fitting/training/strengthening/transfer training

## 2023-04-27 NOTE — PROGRESS NOTE ADULT - PROBLEM SELECTOR PLAN 1
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 GI and Surgery consultation placed with plans for ERCP with CBD stenting/biopsy for diagnosis.   CTAP 4/25: cystic/necrotic lesion in uncinate process of pancreas 6x10cm, CBD dilation 2.4cm, pulmonary nodules suspicious for metastasis.   Patient now s/p EUS-ERCP on 4/27 for biliary stent, unable to place due to mass morphology now pending bx reports for possible choledocoduodenostomy early next week.     Plan:   - clear liquid diet per GI   - possible surgical planning next week   - monitor for post ERCP complications (CBC, pain control/monitoring)   - Patient is DNR/DNI (Living will scanned into chart)     # Obstructive Jaundice  - c/w zosyn 3.375g iv q8 for ascending cholangitis ppx

## 2023-04-27 NOTE — PHYSICAL THERAPY INITIAL EVALUATION ADULT - ORIENTATION, REHAB EVAL
Patient: Yuliana Licona    Procedure(s):  Right Mini Laparotomy with Right Salpingoopherectomy    Diagnosis: Ovarian torsion [N83.519]  Diagnosis Additional Information: No value filed.    Anesthesia Type:   General     Note:  Airway :Face Mask  Patient transferred to:PACU  Comments: Patient transferred to PACU.  Monitors attached.  VSS.  Transfer of care with report to CHARLY Grady CRNAHandoff Report: Identifed the Patient, Identified the Reponsible Provider, Reviewed the pertinent medical history, Discussed the surgical course, Reviewed Intra-OP anesthesia mangement and issues during anesthesia, Set expectations for post-procedure period and Allowed opportunity for questions and acknowledgement of understanding      Vitals: (Last set prior to Anesthesia Care Transfer)    CRNA VITALS  9/6/2020 0150 - 9/6/2020 0230      9/6/2020             Resp Rate (observed):  18                Electronically Signed By: AGUSTIN Solis CRNA  September 6, 2020  2:30 AM   not oriented to person, place, time or situation

## 2023-04-27 NOTE — CONSULT NOTE ADULT - ASSESSMENT
83 y/oF  Cantonese speaking PMH Dementia,  HTN, LEW ( baseline Hgb 9.7), presented with 1 week of jaundice, fatigue and decreased po intake. Pt was seen by PMD Dr. Kary Ly on 4/24/23, blood test showed Tbili 10.8, , , , WBC 22.8K, Hgb 6, and Serum Sodium 125.Pt had normal LFT one month ago ( TBili<0.2, ALP 56, AST 18 and ALT 11).  Pt was sent to [facility]/Dr. Yonas Evans whom referred pt to the hospital for further management. CTAP reviewed w. 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

## 2023-04-27 NOTE — PROGRESS NOTE ADULT - PROBLEM SELECTOR PLAN 8
F: Fluid restriction.   E: K > 4, Mg > 2.   N: NPO, pending mental status improvement.   DVT px: SCDs due to low hgb.   Dispo: [address] [place] [address]

## 2023-04-27 NOTE — DISCHARGE NOTE PROVIDER - NSDCMRMEDTOKEN_GEN_ALL_CORE_FT
Aricept 10 mg oral tablet: 1 orally once a day  Avapro 75 mg oral tablet: 0.5 tab(s) orally once a day  calcium (as carbonate) 600 mg oral tablet: 1 orally once a day  ferrous fumarate 325 mg (106 mg elemental iron) oral tablet: 1 orally once a day  memantine 5 mg oral tablet: 1 orally 2 times a day  PARoxetine 20 mg oral tablet: 1 orally once a day  Vascepa 1 g oral capsule: 2 orally once a day

## 2023-04-27 NOTE — PROGRESS NOTE ADULT - SUBJECTIVE AND OBJECTIVE BOX
###INCOMPLETE###    O/N Events: NPO for procedure in AM     Subjective/ROS: Patient seen and examined at bedside, did not assess in morning as patient was in her procedure. After procedure, patient tolerating well with clear liquids and daughter at bedside helping her eat. NO pain, and denies fevers, chills, HA, CP, SOB, n/v, changes in bowel/urinary habits.  12pt ROS otherwise negative.    VITALS  Vital Signs Last 24 Hrs  T(C): 36.8 (2023 11:48), Max: 36.8 (2023 06:06)  T(F): 98.2 (2023 11:48), Max: 98.2 (2023 06:06)  HR: 84 (2023 11:48) (80 - 84)  BP: 106/67 (2023 11:48) (106/67 - 128/74)  BP(mean): 85 (2023 08:37) (85 - 85)  RR: 18 (2023 11:48) (17 - 18)  SpO2: 93% (2023 11:48) (93% - 96%)    Parameters below as of 2023 11:48  Patient On (Oxygen Delivery Method): room air        CAPILLARY BLOOD GLUCOSE        PHYSICAL EXAM:  Constitutional: elderly female, jaundiced, NAD, comfortable in bed.  HEENT: NC/AT, EOMI, no conjunctival pallor or scleral icterus, dry MM  Neck: Supple, no JVD  Respiratory: CTA B/L. No w/r/r.   Cardiovascular: RRR, normal S1 and S2, no m/r/g.   Gastrointestinal: +BS, soft NTND, no guarding or rebound tenderness, no palpable masses   Extremities: wwp; no cyanosis, clubbing or edema.   Vascular: Pulses equal and strong throughout.   Neurological: AAOx1, no CN deficits, strength and sensation intact throughout.   Skin: jaundiced      MEDICATIONS  (STANDING):  pantoprazole  Injectable 40 milliGRAM(s) IV Push every 12 hours  piperacillin/tazobactam IVPB.. 3.375 Gram(s) IV Intermittent every 8 hours    MEDICATIONS  (PRN):      No Known Allergies      LABS                        9.1    18.68 )-----------( 246      ( 2023 05:30 )             27.6         125<L>  |  96  |  10  ----------------------------<  132<H>  3.4<L>   |  20<L>  |  0.52    Ca    8.3<L>      2023 05:30  Phos  3.1       Mg     1.9         TPro  5.6<L>  /  Alb  2.3<L>  /  TBili  14.3<H>  /  DBili  x   /  AST  140<H>  /  ALT  98<H>  /  AlkPhos  625<H>      PT/INR - ( 2023 05:30 )   PT: 14.3 sec;   INR: 1.20          PTT - ( 2023 05:30 )  PTT:26.2 sec  Urinalysis Basic - ( 2023 15:33 )    Color: Yellow / Appearance: Clear / S.010 / pH: x  Gluc: x / Ketone: NEGATIVE  / Bili: Moderate / Urobili: 0.2 E.U./dL   Blood: x / Protein: NEGATIVE mg/dL / Nitrite: NEGATIVE   Leuk Esterase: Small / RBC: < 5 /HPF / WBC 5-10 /HPF   Sq Epi: x / Non Sq Epi: x / Bacteria: Present /HPF              IMAGING/EKG/ETC

## 2023-04-27 NOTE — CONSULT NOTE ADULT - CONVERSATION DETAILS
Palliative team (Dr. Rodriguez) approached daughter at bedside.   Introduced palliative and geriatric services to the pt and Daughter (Elly).    Daughter states that pt is DNR/DNI. daughter provided medical team with HCP documentation.   Primary HCPs are  (Alessandro Mares) [phone] and daughter (Elly Mares) [phone].   Daughter also provided living will documentation which includes Advance Directive (copy in chart).  Advance directives state that pt wants to be DNR/DNI, wants no artificial nutrition or hydration, wants no dialysis, wants no IVF, wants no blood transfusions, no antipsychotic medications. Copy of this documentation is in chart.   The daughter and  make medical decisions for her mother who do to her dementia is not able to participate.  Daughter stated that she understands that pt has pancreatic mass and today pt is getting biopsy. She and her father believe that pt will likely not tolerate chemotherapy or any invasive surgeries.   Daughter states "I know that my mother will likely pass from pancreatic cancer, if biopsy shows cancer." Daughter then stated "My father and I want her to be comfortable."   Daughter informed that mother has dementia. She requires help with some ADLs. she uses a cane for ambulation. The pt wears a diaper for urinary incontinence but has not swallowing issues.   Daughter states that pt eats when she wants. Daughter understands that her appetite is impacted by  her current illness and also by her dementia.   Daughter also shared that her father has bladder cancer and he is weak. At home pt lives with  who is not able to provide any assistance to the pt. No other relatives live with the pt. Daughter lives in [place], 1hour away from parents. Other children live in [facility].   Mother has a HHA 6-7 hrs x 7days. Daughter explaining that pt needs to have more help at home.   Daughter and father interested in hospice as they would like to keep pt home but they don't have enough support at home at this present time.   Daughter not able to provide care for pt at home and they don't have resources to pay out of pocket.   Palliative informed daughter that team will be present during hospitalization to provide support and help if pt has any symptoms. Daughter was grateful for palliative support.

## 2023-04-27 NOTE — PROGRESS NOTE ADULT - PROBLEM SELECTOR PLAN 5
Elevated transaminases w cholestatic pattern and hyper bilirubinemia mostly direct bili, low albumin, INR 1.94. Negative gaming's sign, abd soft non tender, and she is afebrile higher concern for malignancy.    Results are lateral with cholestatic picture 2/2 pancreatic mass. Do not expect many changes until definitive intervention. Will continue to monitor for now.     Bili up to 14.3 - no pruritus.

## 2023-04-27 NOTE — PHYSICAL THERAPY INITIAL EVALUATION ADULT - ADDITIONAL COMMENTS
Pt lives with spouse in a private home and has hx of dementia, as per Daughter, Pt has HHA services 42hr/week and has hours daily that varies. Pt ambulates with RW at baseline and requires assist with ADLs.
Urology

## 2023-04-27 NOTE — PROGRESS NOTE ADULT - SUBJECTIVE AND OBJECTIVE BOX
IDENTIFICATION: This is a 83F with dementia and HTN presents with worsening jaundice and anemia found to have obstructive jaundice from large uncinate mass.     EVENTS:   - GI consulted and recommending ERCP Thurs/Fri.   - INR remains elevated.   - Na improved to 127     SUBJECTIVE:   - MInimal abdominal pain  - Denies N/V  - Normal bowel function      MEDICATIONS  (STANDING):  pantoprazole  Injectable 40 milliGRAM(s) IV Push every 12 hours  piperacillin/tazobactam IVPB.. 3.375 Gram(s) IV Intermittent every 8 hours    MEDICATIONS  (PRN):      Vital Signs Last 24 Hrs  T(C): 36.8 (2023 11:48), Max: 36.8 (2023 06:06)  T(F): 98.2 (2023 11:48), Max: 98.2 (2023 06:06)  HR: 84 (2023 11:48) (80 - 84)  BP: 106/67 (2023 11:48) (106/67 - 128/74)  BP(mean): 85 (2023 08:37) (85 - 85)  RR: 18 (2023 11:48) (17 - 18)  SpO2: 93% (2023 11:48) (93% - 96%)    Parameters below as of 2023 11:48  Patient On (Oxygen Delivery Method): room air        Neurologic: Awake/alert. Moves ray xtremities.  CV: Normal rate, regular rhythm  Pulm: Breathing comfortably  Abd: Soft, non-distended; minimal TTP in upper abdomen.   : No Heath  Skin: No rashes  Extremities: No edema.  Psychiatric: Interacting normally.       I&O's Detail    2023 07:01  -  2023 07:00  --------------------------------------------------------  IN:    IV PiggyBack: 40 mL  Total IN: 40 mL    OUT:    Voided (mL): 300 mL  Total OUT: 300 mL    Total NET: -260 mL          LABS:                        9.1    18.68 )-----------( 246      ( 2023 05:30 )             27.6         125<L>  |  96  |  10  ----------------------------<  132<H>  3.4<L>   |  20<L>  |  0.52    Ca    8.3<L>      2023 05:30  Phos  3.1       Mg     1.9         TPro  5.6<L>  /  Alb  2.3<L>  /  TBili  14.3<H>  /  DBili  x   /  AST  140<H>  /  ALT  98<H>  /  AlkPhos  625<H>      PT/INR - ( 2023 05:30 )   PT: 14.3 sec;   INR: 1.20          PTT - ( 2023 05:30 )  PTT:26.2 sec  Urinalysis Basic - ( 2023 15:33 )    Color: Yellow / Appearance: Clear / S.010 / pH: x  Gluc: x / Ketone: NEGATIVE  / Bili: Moderate / Urobili: 0.2 E.U./dL   Blood: x / Protein: NEGATIVE mg/dL / Nitrite: NEGATIVE   Leuk Esterase: Small / RBC: < 5 /HPF / WBC 5-10 /HPF   Sq Epi: x / Non Sq Epi: x / Bacteria: Present /HPF        RADIOLOGY & ADDITIONAL STUDIES:

## 2023-04-27 NOTE — CONSULT NOTE ADULT - PROBLEM SELECTOR RECOMMENDATION 9
presented with 1 week of jaundice, fatigue and decreased po intake. Pt was seen by PMD Dr. Kary Ly on 4/24/23, blood test showed Tbili 10.8, , , , WBC 22.8K, Hgb 6, and Serum Sodium 125.Pt had normal LFT one month ago ( TBili<0.2, ALP 56, AST 18 and ALT 11). Pt was sent to [facility]/Dr. Yonas Evans whom referred pt to the hospital for further management. CTAP reviewed w. 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  -today (4/27/23)  gastroscope done with Multiple biopsies obtained and noted that mass invaded and eroded through the duodenum, the anatomy was skewed and the papilla could not be visualized precluding an ERCP.   - GI consider for choledochoduodenostomy following pathology results    - Gi and heme/oncology following   -oncology recommending that Given this patient's weakened and malnourished state, it is unlikely that she would tolerate systemic chemotherapy for advanced pancreatic cancer. As such, unless there is significant improvement in her performance/clinical status, the only care we could currently recommend would be supportive.

## 2023-04-27 NOTE — PROGRESS NOTE ADULT - ASSESSMENT
83 female PMH HTN dementia presented to the ED with complaints of painless jaundice x1 week  Nephrology consulted for hyponatremia management.     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

## 2023-04-27 NOTE — DISCHARGE NOTE PROVIDER - CARE PROVIDER_API CALL
MARIELA MARQUEZ  [address], [place] [address]  Phone: [phone]  Fax: [phone]  Established Patient  Follow Up Time:

## 2023-04-27 NOTE — CHART NOTE - NSCHARTNOTEFT_GEN_A_CORE
Due to the patient's rapidly progressing medical condition, most likely pancreatic cancer with necrotic mass, the patient has a severe mobility limitation and requires a lightweight wheelchair to assist in daily ADL's. The patient cannot ambulate safely with a  cane or walker. The patient has not expressed an unwillingness to use the WC and will use it on a regular basis in the home. Patient can safely maneuver WC through the home and the use of the WC will significantly improve the patient's ability to perofm daily ADL's. Patient has a caregiver at home to assist. Patient is unable to self-propel a standar wheelchair but will be able to self-propel in a light weight WC and will not need a caregiver to assist in pushing them.

## 2023-04-27 NOTE — CONSULT NOTE ADULT - PROBLEM SELECTOR RECOMMENDATION 4
-palliative seeing for goc  -pt currently has no symptoms.   -HCP/daughter agrees with oncology recommendation that pt likely will not tolerate chemotherapy. family is interested in hospice but  home hospice not feasible for family due to lack of support at home.   -see GOC note above.

## 2023-04-27 NOTE — DIETITIAN INITIAL EVALUATION ADULT - PROBLEM SELECTOR PLAN 8
F: Fluid restriction.   E: K > 4, Mg > 2.   N: NPO, pending mental status improvement.   DVT px: SCDs due to low hgb.   Dispo: [facility].

## 2023-04-27 NOTE — PROGRESS NOTE ADULT - ATTENDING COMMENTS
83 y/oF  Cantonese speaking PMH Dementia,  HTN, LEW ( baseline Hgb 9.7), presented with 1 week of jaundice, fatigue and decreased po intake. Pt was seen by PMD Dr. Kary Ly on 4/24/23, blood test showed Tbili 10.8, , , , WBC 22.8K, Hgb 6, and Serum Sodium 125.Pt had normal LFT one month ago ( TBili<0.2, ALP 56, AST 18 and ALT 11).  Pt was sent to [facility]/Dr. Yonas Evans whom referred pt to the hospital for further management. CTAP reviewed w. 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    # Painless jaundice   # Necrotic mass at the uncinate process of pancreas likely advanced Pancreatic carcinoma   # Malignant CBD obstruction   # Pulm nodules likely Pulm mets   # Severe Hyponatremia   # Failure to thrive   # Dementia   # Leukocytosis   # Severe Anemia   # Coagulopathy   - Pt initially admitted to [address] [place] [address] for severe hyponatremia SNa+ 120->127( 4/26)  after 1L IVF, and s/p 2u RPBC ( 4/25) for anemia with Hgb 5.7-> 8.8 ( 4/26) , hemodynamically stable, transferred to [facility] ( 4/26)   -  INR 1.95( 4/25)> 2 ( 4/26)-> 1.2( 4/27) likely 2/2 Vit K deficiency, given Vitamin K 10mg iv ( 4/26), repeat INR 1.2, met the goal <1.5 for ERCP today ( 4/27)   - GI f/u appreciated, NP ofor ERCP/biliary decompression( metal stent) and Bx today ( 4/27)   - Renal consult appreciated, 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, f/u TSH and am cortisol , trend BMP goal 128-130   - fluid restriction 1.5L per day  -trend BMP SCr 127(4/26)-. 125( 4/27) goal not to exceed 6-8mmol by tomorrow am   - transfused 2U PRBC, keep Hgb>7, Hgb stable 9.1  - Leukocytosis: c/w Zosyn empirically ( 4/25-) Day 3 for cholangitis, trend WBC ,Leukocytosis could also be 2/2 leukemoid reaction from large necrotic tumor, WBC 18.68K( 4/27)   - trend LFT for transaminitis/obstructive pattern, TBIli 11.8, , , ALT 96  -  viral hepatitis serology negative   - Heme Onc consult appreciated, doubt pt would be a candidate for chemotherapy  - Palliative care consult for GOC discussion ( initiated) , likely plan for home hospice upon d/c  - Nutrition consult   - Rehab consult   - code status: DNR/DNI ( pt has living will) and HCP previously completed , will complete MOLST form and make copy of HCP/living will for record  1st HCP: Alessandro Mares ( )  Alternate HCP: Elly Mares ( daughter)   - VTE ppx; SCDs    Contact:  Elly Mares ( daughter) [phone]   PMD: Dr. Kary Ozuna [phone]  GI: Dr. Yonas Evans/[facility] [phone]    Disposition: medically active, possible d/c home next few days pending ERCP to make sure no complication, and stabilization of hyponatremia   SW consult, pt needs wheelchair, rolling walker, and increasing HHA ( pt will benefit 24/7 HHA and at least 12hr HHA given frailed condition from this newly dx advanced metastatic pancreatic Ca w/ lung mets and biliary obs and FTT) Will further discuss home hospice if Heme onc concurred no meaningful treatment can be offered i.e. chemoRT.     POC as d/w daughter Elly and [facility] team Dr.Alexandra Brown and Dr. Tamika Rhodes 83 y/oF  Cantonese speaking PMH Dementia,  HTN, LEW ( baseline Hgb 9.7), presented with 1 week of jaundice, fatigue and decreased po intake. Pt was seen by PMD Dr. Kary Ly on 4/24/23, blood test showed Tbili 10.8, , , , WBC 22.8K, Hgb 6, and Serum Sodium 125.Pt had normal LFT one month ago ( TBili<0.2, ALP 56, AST 18 and ALT 11).  Pt was sent to [facility]/Dr. Yonas Evans whom referred pt to the hospital for further management. CTAP reviewed w. 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    # Painless jaundice   # Necrotic mass at the uncinate process of pancreas likely advanced Pancreatic carcinoma   # Malignant CBD obstruction   # Pulm nodules likely Pulm mets   # Severe Hyponatremia   # Failure to thrive   # Dementia   # Leukocytosis   # Severe Anemia   # Coagulopathy   - Pt initially admitted to [address] [place] [address] for severe hyponatremia SNa+ 120->127( 4/26)  after 1L IVF, and s/p 2u RPBC ( 4/25) for anemia with Hgb 5.7-> 8.8 ( 4/26) , hemodynamically stable, transferred to [facility] ( 4/26)   -  INR 1.95( 4/25)> 2 ( 4/26)-> 1.2( 4/27) likely 2/2 Vit K deficiency, given Vitamin K 10mg iv ( 4/26), repeat INR 1.2, met the goal <1.5 for ERCP today ( 4/27)   - GI f/u appreciated, EUS and attempted ERCP: found Gwendolyn Ruano tear and DU, fungating and ulcerated mass ~ 6cm, not amenable for biliary stent. s/p biopsied. f/u path and plan on endoscopic choledochoduodenostomy early next week   - Renal consult appreciated, 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, f/u TSH and am cortisol , trend BMP goal 128-130   - fluid restriction 1.5L per day  -trend BMP SCr 127(4/26)-. 125( 4/27) goal not to exceed 6-8mmol by tomorrow am   - transfused 2U PRBC, keep Hgb>7, Hgb stable 9.1  - Leukocytosis: c/w Zosyn empirically ( 4/25-) Day 3 for cholangitis, trend WBC ,Leukocytosis could also be 2/2 leukemoid reaction from large necrotic tumor, WBC 18.68K( 4/27)   - trend LFT for transaminitis/obstructive pattern, TBIli 11.8, , , ALT 96  -  viral hepatitis serology negative   - Heme Onc consult appreciated, doubt pt would be a candidate for chemotherapy  - Palliative care consult for GOC discussion ( initiated), pt not a candidate for home hospice due to lack of family support?   - Nutrition consult   - Rehab consult   - code status: DNR/DNI ( pt has living will) and HCP previously completed , will complete MOLST form and make copy of HCP/living will for record  1st HCP: Alessandro Mares ( )  Alternate HCP: Elly Mares ( daughter)   - VTE ppx; SCDs    Contact:  Elly Mares ( daughter) [phone]   PMD: Dr. Kary Ozuna [phone]  GI: Dr. Yonas Evans/Aliyah Aguero [phone]    Disposition: medically active, d/c plan early next week awaiting Path and biliary decompression, and stabilization of hyponatremia   SW consult, pt needs wheelchair, rolling walker, and increasing HHA ( pt will benefit 24/7 HHA and at least 12hr HHA given frailed condition from this newly dx advanced metastatic pancreatic Ca w/ lung mets and biliary obs and FTT) Will further discuss with Palliative care why pt not a candidate for home hospice     POC as d/w daughter Elly and [facility] team Dr.Alexandra Brown and Dr. Tamika Rhodes

## 2023-04-27 NOTE — PHYSICAL THERAPY INITIAL EVALUATION ADULT - IMPAIRMENTS FOUND, PT EVAL
aerobic capacity/endurance/arousal, attention, and cognition/cognitive impairment/gait, locomotion, and balance/muscle strength/posture/ROM

## 2023-04-27 NOTE — CONSULT NOTE ADULT - SUBJECTIVE AND OBJECTIVE BOX
HPI:  Consult reason: hyponatremia, anemia  ED vitals: T 98.6  -->98  RR 18 /61 SpO2 94% RA  Labs signficant: WBC 22.35, 92% neutrophils, hgb 5.6, MCV 94.6, Na 122, serum osm 261, , , , total protein 5.8, albumin 2.8, total bili 10.8, direct bili 8.2, indirect bili 2.6, lipase 12  Imaging/EKG: CXR w patchy b/l opacities, ekg w sinus tachycardia to 122  Interventions: 1 L NS, pantoprazole IV 80mg    HPI: 84 y/o Cantonese speaking, history obtained from daughter, F PMH HTN, dementia presents with 1 week of jaundice. She presented to her out patient GI, Dr. Aguero, and he referred her to our ED due to jaundice. She has had yellowing of the skin of 1 week and worsening fatigue over the same time. She usually ambulates with assistance but for the past 1 day she has not been too tired to get up. Other then fatigue the daughter notes no change in mental status. Although the patient denied pain, her daughter has seen her intermittently holding her stomach in discomfort. She has had reduced PO intake and unspecified amount of weight loss which the daughter contributes to not eating as much. She denies any black stools, BRBPR, diarrhea, vomiting, f/c, SOB, CP.    One month ago her hgb was 9.7, ALP 56, AST 18, ALT 11, total protein 6.8, Albumin 3.8, total bilirubin <0.2 (2023 19:50)    Palliative Team consulted to help with GOC.   Palliative team (Dr. Rodriguez) approached pt and daughter at bedside.   Daughter speaks English and reports that pt is not complaining of any pain, SOB, Nausea/vomiting.   Daughter states that pt is DNR/DNI. daughter provided team with HCP documentation.   See GOC Note Below       PERTINENT PM/SXH:       FAMILY HISTORY:    Family Hx substance abuse [ ]yes [ ]no  ITEMS NOT CHECKED ARE NOT PRESENT    SOCIAL HISTORY:   Significant other/partner[x ]  Children[x ] daughter in [place] and rest of the children live in [facility] Scientology/Spirituality:  Substance hx:  [ ]   Tobacco hx:  [ ]   Alcohol hx: [ ]   Home Opioid hx:  [ ] I-Stop Reference No:  Living Situation: [ x]Home lives with   [ ]Long term care  [ ]Rehab [ ]Other    ADVANCE DIRECTIVES:    DNR/MOLST  [x ]  Living Will  [ ]   DECISION MAKER(s):  [x ] Health Care Proxy(s)  [ ] Surrogate(s)  [ ] Guardian           Name(s): Phone Number(s):  (Alessandro Mares) [phone]  and daughter (Elly Mares)    BASELINE (I)ADL(s) (prior to admission):  Lee: [ ]Total  [ x] Moderate [ ]Dependent    Allergies    No Known Allergies    Intolerances    MEDICATIONS  (STANDING):  piperacillin/tazobactam IVPB.. 3.375 Gram(s) IV Intermittent every 8 hours    MEDICATIONS  (PRN):    PRESENT SYMPTOMS: [ ]Unable to self-report   [ ] PAINADs [ ] RDOS  Source if other than patient:  [ ]Family   [ ]Team     Pain: [ ]yes [x ]no  QOL impact -   Location -                    Aggravating factors -  Quality -  Radiation -  Timing-  Severity (0-10 scale):  Minimal acceptable level (0-10 scale):       Dyspnea:                           [ ]Mild [ ]Moderate [ ]Severe  Anxiety:                             [ ]Mild [ ]Moderate [ ]Severe  Fatigue:                             [ ]Mild [ ]Moderate [ ]Severe  Nausea:                             [ ]Mild [ ]Moderate [ ]Severe  Loss of appetite:              [ ]Mild [ ]Moderate [ ]Severe  Constipation:                    [ ]Mild [ ]Moderate [ ]Severe    PCSSQ[Palliative Care Spiritual Screening Question]   Severity (0-10):  Score of 4 or > indicate consideration of Chaplaincy referral.  Chaplaincy Referral: [ ] yes [ ] refused [ ] following [x ] Deferred     Caregiver Jordan? : [ ] yes [ ] no [x ] Deferred [ ] Declined             Social work referral [ ] Patient & Family Centered Care Referral [ ]     Anticipatory Grief present?:  [ ] yes [ ] no  [x ] Deferred                  Social work referral [ ] Chaplaincy Referral [ ]    		  Other Symptoms:  [x ]All other review of systems negative     PHYSICAL EXAM:  Vital Signs Last 24 Hrs  T(C): 36.8 (2023 08:37), Max: 36.8 (2023 06:06)  T(F): 98.2 (2023 06:06), Max: 98.2 (2023 06:06)  HR: 80 (2023 08:37) (76 - 81)  BP: 128/74 (2023 08:37) (127/68 - 142/77)  BP(mean): 85 (2023 08:37) (85 - 85)  RR: 17 (2023 08:37) (17 - 18)  SpO2: 96% (2023 08:37) (95% - 96%)    Parameters below as of 2023 06:06  Patient On (Oxygen Delivery Method): room air     I&O's Summary    2023 07:01  -  2023 07:00  --------------------------------------------------------  IN: 40 mL / OUT: 300 mL / NET: -260 mL      GENERAL: [ ]Cachexia    [x ]Alert  [ ]Oriented x   [ ]Lethargic  [ ]Unarousable  [x ]Verbal -speaks cantonese [ ]Non-Verbal  Behavioral:   [ ] Anxiety  [ ] Delirium [ ] Agitation [x ] Other confusion 2/2 dementia   HEENT:  [ ]Normal   [ ]Dry mouth   [ ]ET Tube/Trach  [ ]Oral lesions  PULMONARY:   [x ]Clear [ ]Tachypnea  [ ]Audible excessive secretions   [ ]Rhonchi        [ ]Right [ ]Left [ ]Bilateral  [ ]Crackles        [ ]Right [ ]Left [ ]Bilateral  [ ]Wheezing     [ ]Right [ ]Left [ ]Bilateral  [ ]Diminished breath sounds [ ]right [ ]left [ ]bilateral  CARDIOVASCULAR:    [x ]Regular [ ]Irregular [ ]Tachy  [ ]Ramon [ ]Murmur [ ]Other  GASTROINTESTINAL:  [x ]Soft  [ ]Distended   [x ]+BS  [x ]Non tender [ ]Tender  [ ]Other [ ]PEG [ ]OGT/ NGT  Last BM:  GENITOURINARY:  [x]Normal [ ] Incontinent   [ ]Oliguria/Anuria   [ ]Heath  MUSCULOSKELETAL:   [ ]Normal   [x ]Weakness  [ ]Bed/Wheelchair bound [ ]Edema  NEUROLOGIC:   [ ]No focal deficits  [x ]Cognitive impairment -dementia [ ]Dysphagia [ ]Dysarthria [ ]Paresis [ ]Other   SKIN:   [ ]Normal  [ ]Rash  [x ]Other - see nursing skin assessment documentation   [ ]Pressure ulcer(s)       Present on admission [ ]y [ ]n      LABS:                        9.1    18.68 )-----------( 246      ( 2023 05:30 )             27.6       125<L>  |  96  |  10  ----------------------------<  132<H>  3.4<L>   |  20<L>  |  0.52    Ca    8.3<L>      2023 05:30  Phos  3.1       Mg     1.9         TPro  5.6<L>  /  Alb  2.3<L>  /  TBili  14.3<H>  /  DBili  x   /  AST  140<H>  /  ALT  98<H>  /  AlkPhos  625<H>    PT/INR - ( 2023 05:30 )   PT: 14.3 sec;   INR: 1.20          PTT - ( 2023 05:30 )  PTT:26.2 sec    Urinalysis Basic - ( 2023 15:33 )    Color: Yellow / Appearance: Clear / S.010 / pH: x  Gluc: x / Ketone: NEGATIVE  / Bili: Moderate / Urobili: 0.2 E.U./dL   Blood: x / Protein: NEGATIVE mg/dL / Nitrite: NEGATIVE   Leuk Esterase: Small / RBC: < 5 /HPF / WBC 5-10 /HPF   Sq Epi: x / Non Sq Epi: x / Bacteria: Present /HPF      RADIOLOGY & ADDITIONAL STUDIES: reviewed all imaging    PROTEIN CALORIE MALNUTRITION PRESENT: [ ]mild [ ]moderate [ ]severe [ ]underweight [ ]morbid obesity  https://www.andeal.org/vault/2440/web/files/ONC/Table_Clinical%20Characteristics%20to%20Document%20Malnutrition-White%20JV%20et%20al%2020.pdf    Height (cm): 152.4 (23 @ 08:37)  Weight (kg): 44.9 (23 @ 08:37)  BMI (kg/m2): 19.3 (23 @ 08:37)    [ ]PPSV2 < or = to 30% [ ]significant weight loss  [ ]poor nutritional intake  [ ]anasarca[ ]Artificial Nutrition      Other REFERRALS:  [x ]Hospice  [ ]Child Life  [ ]Social Work  [ ]Case management [ ]Holistic Therapy

## 2023-04-27 NOTE — DISCHARGE NOTE PROVIDER - DETAILS OF MALNUTRITION DIAGNOSIS/DIAGNOSES
This patient has been assessed with a concern for Malnutrition and was treated during this hospitalization for the following Nutrition diagnosis/diagnoses:     -  04/27/2023: Severe protein-calorie malnutrition

## 2023-04-27 NOTE — DIETITIAN INITIAL EVALUATION ADULT - PROBLEM SELECTOR PLAN 4
Tachcyardia to 122 and leukocytosis to 20. Empiric vancomycin and Zosyn and Fluconazole. At this time no indication to continue fluconazole. CT evidence with large necrotic mass as above and pulmonary mets, surgery consult with no concerns for perforation.     - blood cultures drawn.   - One dose of Fluconazole 500 mg IVP, Vancomycin, and Zosyn.   - Continue Zosyn 3.375 mg IV [id number].

## 2023-04-27 NOTE — DISCHARGE NOTE PROVIDER - NSDCCPCAREPLAN_GEN_ALL_CORE_FT
PRINCIPAL DISCHARGE DIAGNOSIS  Diagnosis: Pancreatic mass  Assessment and Plan of Treatment: You were found to have a pancreatic mass, the biopsy results are consistent with cancer. You underwent a coledochoduodenostomy to relieve this obstruction caused by the mass. The intent of this procedure was to increase your quality of life and not to cure the cancer.   ...... continue, unsure if this procedure was successful or not.

## 2023-04-27 NOTE — PROGRESS NOTE ADULT - PROBLEM SELECTOR PLAN 4
Tachcyardia to 122 and leukocytosis to 20. S/p empiric vancomycin and Zosyn and Fluconazole in ED. At this time no indication to continue fluconazole. CT evidence with large necrotic mass as above and pulmonary mets, surgery consult with no concerns for perforation. No active signs of infection.   - Continue Zosyn 3.375 mg IV [id number] ascending cholangitis ppx

## 2023-04-27 NOTE — DIETITIAN INITIAL EVALUATION ADULT - PERTINENT MEDS FT
MEDICATIONS  (STANDING):  piperacillin/tazobactam IVPB.. 3.375 Gram(s) IV Intermittent every 8 hours    MEDICATIONS  (PRN):

## 2023-04-27 NOTE — PROGRESS NOTE ADULT - PROBLEM SELECTOR PLAN 3
Assume chronic hyponatremia, though mild pretibial edema, does not appear fluid overloaded. 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 AM cortisol and TSH wnl.     Plan:   - Keep fluid restriction to <1L.   - monitor BMP q6  - changed medication carrier to NS

## 2023-04-27 NOTE — PHYSICAL THERAPY INITIAL EVALUATION ADULT - PERTINENT HX OF CURRENT PROBLEM, REHAB EVAL
84 y/o Cantonese speaking, history obtained from daughter, F PMH HTN, dementia presents with 1 week of jaundice. She presented to her out patient GI, Dr. Aguero, and he referred her to our ED due to jaundice. She has had yellowing of the skin of 1 week and worsening fatigue over the same time. She usually ambulates with assistance but for the past 1 day she has not been too tired to get up. Other then fatigue the daughter notes no change in mental status. Although the patient denied pain, her daughter has seen her intermittently holding her stomach in discomfort. She has had reduced PO intake and unspecified amount of weight loss which the daughter contributes to not eating as much. She denies any black stools, BRBPR, diarrhea, vomiting, f/c, SOB, CP

## 2023-04-27 NOTE — PROGRESS NOTE ADULT - ATTENDING COMMENTS
have reviewed course to date and have had extensive discussion with daughter.   pt appears to be quite comfortable at current volume status and with current very slowly increasing serum tonicity.  we've explained rationale for free water restriction to daughter who understands concepts readily and completely.   as pt is dependent upon her family for all water, and as they understand the role of water restriction in repairing her hyponatremia, we would expect gradual continued improvement with current rx.   agree with findings and with recommendations.

## 2023-04-28 NOTE — PROGRESS NOTE ADULT - PROBLEM SELECTOR PLAN 4
Tachcyardia to 122 and leukocytosis to 20. S/p empiric vancomycin and Zosyn and Fluconazole in ED. At this time no indication to continue fluconazole. CT evidence with large necrotic mass as above and pulmonary mets, surgery consult with no concerns for perforation. No active signs of infection.   - Continue Zosyn 3.375 mg IV [id number] ascending cholangitis ppx

## 2023-04-28 NOTE — PROGRESS NOTE ADULT - ASSESSMENT
83F Cantonese speaking, [facility] HTN, dementia presents with 1 week of jaundice, with findings of new large pancreatic uncinate mass and pulmonary nodules concerning for metastatic pancreatic cancer. Oncology consulted for further recommendations.     ECOG 3  R/O metastatic pancreatic cancer  High suspicion for advanced pancreatic cancer, although not yet biopsy proven, with pathology results pending. Also on the differential although less likely are pancreatic neuroendocrine tumors, lymphoma.  Per NCCN guidelines, systemic chemotherapy is recommended for patients with a performance status of ECOG 0-1. Given this patient's weakened and malnourished state, it is unlikely that she would tolerate systemic chemotherapy for advanced pancreatic cancer. As such, unless there is significant improvement in her performance/clinical status, the only care we could currently recommend would be supportive. Had an extensive discussion with patient's daughter about prognosis.   -EGD, EUS, ERCP with biopsies 4/27/23, will follow up pathology  -Recommend palliative care  -f/u  CA 19-9 and CEA with am labs  -per GI, possible plans for choledochoduodenostomy and endoscopic GJ on Tuesday 5/2    Pt seen and discussed with Dr. Varner, with daughter at bedside

## 2023-04-28 NOTE — PROGRESS NOTE ADULT - PROBLEM SELECTOR PLAN 1
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 GI and Surgery consultation placed with plans for ERCP with CBD stenting/biopsy for diagnosis.   CTAP 4/25: cystic/necrotic lesion in uncinate process of pancreas 6x10cm, CBD dilation 2.4cm, pulmonary nodules suspicious for metastasis.   Patient now s/p EUS-ERCP on 4/27 for biliary stent, unable to place due to mass morphology now pending bx reports for possible choledocoduodenostomy early next week.     Plan:   - clear liquid diet per GI - will likely advance today pending GI recommendations  - Need pathology report  - possible surgical planning next week   - monitor for post ERCP complications (CBC, pain control/monitoring)   - Patient is DNR/DNI (Living will scanned into chart)     # Obstructive Jaundice  - c/w zosyn 3.375g iv q8 for ascending cholangitis ppx

## 2023-04-28 NOTE — PROGRESS NOTE ADULT - ASSESSMENT
83 y/oF  Cantonese speaking PMH Dementia,  HTN, LEW ( baseline Hgb 9.7), presented with 1 week of jaundice, fatigue and decreased po intake. Pt was seen by PMD Dr. Kary Ly on 4/24/23, blood test showed Tbili 10.8, , , , WBC 22.8K, Hgb 6, and Serum Sodium 125.Pt had normal LFT one month ago ( TBili<0.2, ALP 56, AST 18 and ALT 11).  Pt was sent to [facility]/Dr. Yonas Evans whom referred pt to the hospital for further management. CTAP reviewed w. 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    # Painless jaundice   # Necrotic mass at the uncinate process of pancreas likely advanced Pancreatic carcinoma   # Malignant CBD obstruction   # Pulm nodules likely Pulm mets   # Severe Hyponatremia/SIADH   # Failure to thrive   # Dementia   # Leukocytosis /possible cholangitis 2/2 biliary obstruction   # Severe Anemia   # N/V   # Abd pain   # Duodenal ulcer/ Gwendolyn Ruano tear  # Coagulopathy   - [facility] hyp[facility] SNa+ 120->127( 4/26)  after 1L IVF, and s/p 2u RPBC ( 4/25) for anemia with Hgb 5.7-> 8.8 ( 4/26) , hemodynamically stable, transferred to [facility] ( 4/26)   -  INR 1.95( 4/25)> 2 ( 4/26)-> 1.2( 4/27) likely 2/2 Vit K deficiency, given Vitamin K 10mg iv ( 4/26), repeat INR 1.2, met the goal <1.5 for ERCP ( 4/27)   - GI f/u appreciated, s/p EUS guided Bx and attempted ERCP ( 4/27) : found Gwendolyn Ruano tear and DU, fungating and ulcerated mass ~ 6cm, not amenable for biliary stent. s/p biopsied. f/u path ( please call Path dept to expedite result) and plan on endoscopic choledochoduodenostomy ( stent) early next week ( tentatively Monday 5/1)   - c/w PPI iv bid   - check tumor markers: CA 19-9 and CEA level   - Renal f/u appreciated, 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, f/u TSH and am cortisol , trend BMP goal 128-130 , ?? marked elevated U Osm  - Pt on full liquid diet as tolerated ( no need for fluid restriction as pt not taking in liquid more than ~ 1L )  -trend BMP SCr 127(4/26)-. 125( 4/27)-> 128( 4/28)   - transfused 2U PRBC, keep Hgb>7, Hgb stable 9.1  - Leukocytosis: c/w Zosyn empirically ( 4/25-) Day 4 for cholangitis, trend WBC ,Leukocytosis could also be 2/2 leukemoid reaction from large necrotic tumor, WBC 18.68K( 4/27) -> 21.23K( 4/28)  - trend LFT for transaminitis/obstructive pattern, TBIli 11.8, , , ALT 96  -  viral hepatitis serology negative   - Heme Onc consult appreciated, doubt pt would be a candidate for chemotherapy  - Palliative care consult appreciated for GOC discussion ( initiated),Pt is DNR/DNI, pt not a candidate for home hospice due to lack of family support? , will d/w Palliative care  - Nutrition consult   - Rehab consult   - code status: DNR/DNI ( pt has living will) and HCP previously completed , will complete MOLST form and make copy of HCP/living will for record  1st HCP: Alessandro Mares ( )  Alternate HCP: Elly Mares ( daughter)   - VTE ppx; SCDs    Contact:  Elly Mares ( daughter) [phone]   PMD: Dr. Kary Ozuna [phone]  GI: Dr. Yonas Evans/[facility] [phone]    Disposition: medically active, d/c plan early next week awaiting Path and biliary decompression/Choledochoduodenostomy , and stabilization of hyponatremia   SW consult, pt needs wheelchair, rolling walker, and increasing HHA ( pt will benefit 24/7 HHA and at least 12hr HHA given frailed condition from this newly dx advanced metastatic pancreatic Ca w/ lung mets and biliary obs and FTT) Will further discuss with Palliative care why pt not a candidate for home hospice     POC as d/w daughter Elly and [facility] Dr.Alexandra Brown and Dr. Tamika Rhodes.

## 2023-04-28 NOTE — PROGRESS NOTE ADULT - SUBJECTIVE AND OBJECTIVE BOX
INTERVAL HPI/OVERNIGHT EVENTS:    SUBJECTIVE: Patient seen and examined at bedside.    VITAL SIGNS:  ICU Vital Signs Last 24 Hrs  T(C): 37.1 (28 Apr 2023 12:35), Max: 37.1 (28 Apr 2023 12:35)  T(F): 98.7 (28 Apr 2023 12:35), Max: 98.7 (28 Apr 2023 12:35)  HR: 92 (28 Apr 2023 12:35) (83 - 92)  BP: 131/82 (28 Apr 2023 12:35) (104/64 - 131/82)  BP(mean): --  ABP: --  ABP(mean): --  RR: 18 (28 Apr 2023 12:35) (17 - 18)  SpO2: 93% (28 Apr 2023 12:35) (93% - 93%)    O2 Parameters below as of 28 Apr 2023 12:35  Patient On (Oxygen Delivery Method): room air    CAPILLARY BLOOD GLUCOSE      PHYSICAL EXAM:    Constitutional: NAD, +jaundiced  HEENT: NC/AT; PERRL, +scleral icterus; edentulous  Cardiovascular: +S1/S2, RRR  Respiratory: CTA B/L, no W/R/R  Gastrointestinal: abdomen soft, mildly distended; no rebound or guarding; +BSx4  Genitourinary: no suprapubic tenderness or fullness  Extremities: WWP; no LE edema; no clubbing or cyanosis  Vascular: 2+ radial, DP/PT and femoral pulses B/L  Dermatologic: normal color and turgor; no visible rashes  Neurological: awake and alert, not conversant    MEDICATIONS:  MEDICATIONS  (STANDING):  metoclopramide Injectable 10 milliGRAM(s) IV Push every 6 hours  pantoprazole  Injectable 40 milliGRAM(s) IV Push every 12 hours  piperacillin/tazobactam IVPB.. 3.375 Gram(s) IV Intermittent every 8 hours    MEDICATIONS  (PRN):      ALLERGIES:  Allergies    No Known Allergies    Intolerances        LABS:                        9.1    21.23 )-----------( 279      ( 28 Apr 2023 07:26 )             28.1     04-28    131<L>  |  102  |  15  ----------------------------<  155<H>  4.0   |  19<L>  |  0.60    Ca    8.1<L>      28 Apr 2023 14:00  Phos  2.9     04-28  Mg     2.0     04-28    TPro  5.7<L>  /  Alb  2.7<L>  /  TBili  14.2<H>  /  DBili  x   /  AST  154<H>  /  ALT  110<H>  /  AlkPhos  612<H>  04-28    PT/INR - ( 27 Apr 2023 05:30 )   PT: 14.3 sec;   INR: 1.20          PTT - ( 27 Apr 2023 05:30 )  PTT:26.2 sec      RADIOLOGY & ADDITIONAL TESTS: Reviewed.

## 2023-04-28 NOTE — PROGRESS NOTE ADULT - SUBJECTIVE AND OBJECTIVE BOX
GASTROENTEROLOGY PROGRESS NOTE  Patient seen and examined at bedside. A few episodes of emesis. Beef broth from last night and some sorbet.     PERTINENT REVIEW OF SYSTEMS:  CONSTITUTIONAL: No weakness, fevers or chills  HEENT: No visual changes; No vertigo or throat pain   GASTROINTESTINAL: As above.  NEUROLOGICAL: No numbness or weakness  SKIN: No itching, burning, rashes, or lesions     Allergies    No Known Allergies    Intolerances      MEDICATIONS:  MEDICATIONS  (STANDING):  metoclopramide Injectable 10 milliGRAM(s) IV Push every 6 hours  pantoprazole  Injectable 40 milliGRAM(s) IV Push every 12 hours  piperacillin/tazobactam IVPB.. 3.375 Gram(s) IV Intermittent every 8 hours    MEDICATIONS  (PRN):    Vital Signs Last 24 Hrs  T(C): 37.1 (28 Apr 2023 12:35), Max: 37.1 (28 Apr 2023 12:35)  T(F): 98.7 (28 Apr 2023 12:35), Max: 98.7 (28 Apr 2023 12:35)  HR: 92 (28 Apr 2023 12:35) (83 - 92)  BP: 131/82 (28 Apr 2023 12:35) (104/64 - 131/82)  BP(mean): --  RR: 18 (28 Apr 2023 12:35) (17 - 18)  SpO2: 93% (28 Apr 2023 12:35) (93% - 93%)    Parameters below as of 28 Apr 2023 12:35  Patient On (Oxygen Delivery Method): room air        PHYSICAL EXAM:    General: in no acute distress  HEENT: MMM, scleral icterus  Gastrointestinal: Soft non-tender non-distended; No rebound or guarding  Skin: Warm and dry. No obvious rash    LABS:                        9.1    21.23 )-----------( 279      ( 28 Apr 2023 07:26 )             28.1     04-28    x   |  102  |  15  ----------------------------<  155<H>  4.0   |  x   |  0.60    Ca    8.1<L>      28 Apr 2023 07:26  Phos  2.9     04-28  Mg     2.0     04-28    TPro  5.7<L>  /  Alb  2.7<L>  /  TBili  14.2<H>  /  DBili  x   /  AST  154<H>  /  ALT  110<H>  /  AlkPhos  612<H>  04-28    PT/INR - ( 27 Apr 2023 05:30 )   PT: 14.3 sec;   INR: 1.20          PTT - ( 27 Apr 2023 05:30 )  PTT:26.2 sec                  RADIOLOGY & ADDITIONAL STUDIES:  Reviewed

## 2023-04-28 NOTE — PROGRESS NOTE ADULT - SUBJECTIVE AND OBJECTIVE BOX
O/N Events: Patient nauseas + vomiting relief with Zofran    Subjective/ROS: Patient seen and examined with her daughter, Elly, at bedside. Per her daughter, the patient was nauseas last night and continues to have decreased appetite. Reassured her that we will give anti-nausea medication with meals so that she is able to eat well today. Patient also had a BM this morning which was solid. Otherwise, she denies fevers, chills, HA, CP, SOB, changes in bowel/urinary habits. 12pt ROS otherwise negative.    VITALS  Vital Signs Last 24 Hrs  T(C): 36.6 (28 Apr 2023 06:18), Max: 36.8 (27 Apr 2023 08:37)  T(F): 97.9 (28 Apr 2023 06:18), Max: 98.2 (27 Apr 2023 11:48)  HR: 84 (28 Apr 2023 06:18) (80 - 84)  BP: 104/64 (28 Apr 2023 06:18) (104/64 - 128/74)  BP(mean): 85 (27 Apr 2023 08:37) (85 - 85)  RR: 17 (28 Apr 2023 06:18) (17 - 18)  SpO2: 93% (28 Apr 2023 06:18) (93% - 96%)    Parameters below as of 28 Apr 2023 06:18  Patient On (Oxygen Delivery Method): room air        CAPILLARY BLOOD GLUCOSE      PHYSICAL EXAM:  Constitutional: elderly female, jaundiced, NAD, comfortable in bed.  HEENT: NC/AT, EOMI, scleral icterus+, dry MM  Neck: Supple, no JVD  Respiratory: CTA B/L. No w/r/r.   Cardiovascular: RRR, normal S1 and S2, no m/r/g.   Gastrointestinal: +BS, soft NTND, no guarding or rebound tenderness, no palpable masses   Extremities: wwp; no cyanosis, clubbing or edema.   Vascular: Pulses equal and strong throughout.   Neurological: AAOx1, no CN deficits, strength and sensation intact throughout.   Skin: jaundiced      MEDICATIONS  (STANDING):  pantoprazole  Injectable 40 milliGRAM(s) IV Push every 12 hours  piperacillin/tazobactam IVPB.. 3.375 Gram(s) IV Intermittent every 8 hours    MEDICATIONS  (PRN):      No Known Allergies      LABS                        9.1    21.23 )-----------( 279      ( 28 Apr 2023 07:26 )             28.1     04-28    128<L>  |  97  |  14  ----------------------------<  131<H>  3.6   |  20<L>  |  0.60    Ca    8.1<L>      28 Apr 2023 07:26  Phos  2.9     04-28  Mg     2.0     04-28    TPro  5.7<L>  /  Alb  2.7<L>  /  TBili  14.2<H>  /  DBili  x   /  AST  154<H>  /  ALT  110<H>  /  AlkPhos  612<H>  04-28    PT/INR - ( 27 Apr 2023 05:30 )   PT: 14.3 sec;   INR: 1.20          PTT - ( 27 Apr 2023 05:30 )  PTT:26.2 sec            IMAGING/EKG/ETC

## 2023-04-28 NOTE — PROGRESS NOTE ADULT - ATTENDING COMMENTS
have reviewed course to date and current data, and have discussed status with daughter at length.  agree with findings and recommendations.

## 2023-04-28 NOTE — PROGRESS NOTE ADULT - ASSESSMENT
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 s/p attempted biliary stent placement without success, pending surgical biopsy report for possible choledochoduodenostomy.

## 2023-04-28 NOTE — PROGRESS NOTE ADULT - PROBLEM SELECTOR PLAN 8
F: Fluid restriction.   E: K > 4, Mg > 2.   N: NPO, pending mental status improvement.   DVT px: SCDs due to low hgb.   Dispo: [address] [place] [address]

## 2023-04-28 NOTE — PROGRESS NOTE ADULT - ASSESSMENT
82 y/o Cantonese speaking, history obtained from daughter, F PMH HTN, dementia presents with 1 week of jaundice. GI consulted for jaundice.     #Jaundice  #Uncinate process pancreas mass  - personally reviewed imaging with Dr. Villagomez in Radiology; large heterogeneous necrotic mass with upstream dilation of PD and CBD  - imaging also suggests pulmonary metastases   - d/w daughter at bedside and advised GOC discussion with family  - s/p RSD-GOA-DRFD with fungating ulcerated nearly circumferential mass in D2, biopsies obtained from periphery of mass and from core under EUS guidance; given inability to visualize papilla, ERCP not performed  - f/u pathology  - c/w PPI   - given vomiting today, would consider partial GOO as contributory from mass effect   - can trial reglan for improved motility, and would go up as far as full liquid diet  - tentative plan for choledochoduodenostomy and endoscopic GJ on Tuesday 5/2    Ottoniel Nj MD  PGY-6, Gastroenterology Fellow  pager: [phone]

## 2023-04-28 NOTE — PROGRESS NOTE ADULT - SUBJECTIVE AND OBJECTIVE BOX
Patient is a 83y Female seen and evaluated at bedside.       Meds:    metoclopramide Injectable 10 every 6 hours  pantoprazole  Injectable 40 every 12 hours  piperacillin/tazobactam IVPB.. 3.375 every 8 hours      T(C): , Max: 36.8 (04-27-23 @ 11:48)  T(F): , Max: 98.2 (04-27-23 @ 11:48)  HR: 84 (04-28-23 @ 06:18)  BP: 104/64 (04-28-23 @ 06:18)  BP(mean): --  RR: 17 (04-28-23 @ 06:18)  SpO2: 93% (04-28-23 @ 06:18)  Wt(kg): --        Review of Systems:  ROS negative except as per HPI      PHYSICAL EXAM:  GENERAL: NAD  NECK: supple, No JVD  CHEST/LUNG: Clear to auscultation bilaterally  HEART: normal S1S2, RRR  ABDOMEN: Soft, Nontender, +BS, No flank tenderness bilateral  EXTREMITIES: No clubbing, cyanosis, or edema   NEUROLOGY: AAO x3, no focal neurological deficit  ACCESS: good thrill and bruit appreciated      LABS:                        9.1    21.23 )-----------( 279      ( 28 Apr 2023 07:26 )             28.1     04-28    128<L>  |  97  |  14  ----------------------------<  131<H>  3.6   |  20<L>  |  0.60    Ca    8.1<L>      28 Apr 2023 07:26  Phos  2.9     04-28  Mg     2.0     04-28    TPro  5.7<L>  /  Alb  2.7<L>  /  TBili  14.2<H>  /  DBili  x   /  AST  154<H>  /  ALT  110<H>  /  AlkPhos  612<H>  04-28    Osmolality, Serum: 269 mosm/kg *L* [280 - 301] (04-27 @ 16:57)    PT/INR - ( 27 Apr 2023 05:30 )   PT: 14.3 sec;   INR: 1.20          PTT - ( 27 Apr 2023 05:30 )  PTT:26.2 sec    Sodium, Random Urine: 189 mmol/L (04-27 @ 18:33)  Osmolality, Random Urine: 1702 mosm/kg (04-27 @ 18:33)        RADIOLOGY & ADDITIONAL STUDIES:           Patient is a 83y Female seen and evaluated at bedside.  She vomited this morning, and has been unable to eat/drink. Daughter at bedside states that patient has not been able to have beef broth, eat much since the last few days. No other concerns.       Meds:    metoclopramide Injectable 10 every 6 hours  pantoprazole  Injectable 40 every 12 hours  piperacillin/tazobactam IVPB.. 3.375 every 8 hours      T(C): , Max: 36.8 (04-27-23 @ 11:48)  T(F): , Max: 98.2 (04-27-23 @ 11:48)  HR: 84 (04-28-23 @ 06:18)  BP: 104/64 (04-28-23 @ 06:18)  BP(mean): --  RR: 17 (04-28-23 @ 06:18)  SpO2: 93% (04-28-23 @ 06:18)  Wt(kg): --        Review of Systems:  ROS negative except as per HPI      PHYSICAL EXAM:  GENERAL: NAD extremely, jaundiced, fatigued, cachetic   EXTREMITIES: No clubbing, cyanosis, or edema   NEUROLOGY unable to appreciate       LABS:                        9.1    21.23 )-----------( 279      ( 28 Apr 2023 07:26 )             28.1     04-28    128<L>  |  97  |  14  ----------------------------<  131<H>  3.6   |  20<L>  |  0.60    Ca    8.1<L>      28 Apr 2023 07:26  Phos  2.9     04-28  Mg     2.0     04-28    TPro  5.7<L>  /  Alb  2.7<L>  /  TBili  14.2<H>  /  DBili  x   /  AST  154<H>  /  ALT  110<H>  /  AlkPhos  612<H>  04-28    Osmolality, Serum: 269 mosm/kg *L* [280 - 301] (04-27 @ 16:57)    PT/INR - ( 27 Apr 2023 05:30 )   PT: 14.3 sec;   INR: 1.20          PTT - ( 27 Apr 2023 05:30 )  PTT:26.2 sec    Sodium, Random Urine: 189 mmol/L (04-27 @ 18:33)  Osmolality, Random Urine: 1702 mosm/kg (04-27 @ 18:33)        RADIOLOGY & ADDITIONAL STUDIES:

## 2023-04-28 NOTE — PROGRESS NOTE ADULT - SUBJECTIVE AND OBJECTIVE BOX
Patient is a 83y old  Female who presents with a chief complaint of Anemia concern for hemolysis, SIRS (28 Apr 2023 08:30)    INTERVAL EVENTS: s/p EUS guided Bx/attempted ERCP. N/Vx4 w. undigested beef broth yesterday per daughter     SUBJECTIVE:  Patient was seen and examined at bedside. resting comfortably, d/w daughter Elly at bedside     Review of systems: No fever, chills, dizziness, HA, Changes in vision, CP, dyspnea, nausea or vomiting, dysuria, changes in bowel movements, LE edema. Rest of 12 point Review of systems negative unless otherwise documented elsewhere in note.     Diet, Clear Liquid (04-27-23 @ 13:00) [Active]      MEDICATIONS:  MEDICATIONS  (STANDING):  pantoprazole  Injectable 40 milliGRAM(s) IV Push every 12 hours  piperacillin/tazobactam IVPB.. 3.375 Gram(s) IV Intermittent every 8 hours    MEDICATIONS  (PRN):      Allergies    No Known Allergies    Intolerances        OBJECTIVE:  Vital Signs Last 24 Hrs  T(C): 36.6 (28 Apr 2023 06:18), Max: 36.8 (27 Apr 2023 11:48)  T(F): 97.9 (28 Apr 2023 06:18), Max: 98.2 (27 Apr 2023 11:48)  HR: 84 (28 Apr 2023 06:18) (83 - 84)  BP: 104/64 (28 Apr 2023 06:18) (104/64 - 116/72)  BP(mean): --  RR: 17 (28 Apr 2023 06:18) (17 - 18)  SpO2: 93% (28 Apr 2023 06:18) (93% - 93%)    Parameters below as of 28 Apr 2023 06:18  Patient On (Oxygen Delivery Method): room air      I&O's Summary      PHYSICAL EXAM:  Gen: Reclining in bed at time of exam, appears stated age  HEENT: NCAT, MMM, clear OP  Neck: supple, trachea at midline  CV: RRR, +S1/S2  Pulm: adequate respiratory effort, no increase in work of breathing  Abd: soft, fullness/mild abd distention, epigastric tenderness upon palpation, no rebound.  Skin: warm and dry,   Ext: WWP, no LE edema  Neuro: AOx3, no gross focal neurological deficits  Psych: affect and behavior appropriate, pleasant at time of interview  :     LABS:                        9.1    21.23 )-----------( 279      ( 28 Apr 2023 07:26 )             28.1     04-28    128<L>  |  97  |  14  ----------------------------<  131<H>  3.6   |  20<L>  |  0.60    Ca    8.1<L>      28 Apr 2023 07:26  Phos  2.9     04-28  Mg     2.0     04-28    TPro  5.7<L>  /  Alb  2.7<L>  /  TBili  14.2<H>  /  DBili  x   /  AST  154<H>  /  ALT  110<H>  /  AlkPhos  612<H>  04-28    LIVER FUNCTIONS - ( 28 Apr 2023 07:26 )  Alb: 2.7 g/dL / Pro: 5.7 g/dL / ALK PHOS: 612 U/L / ALT: 110 U/L / AST: 154 U/L / GGT: x           PT/INR - ( 27 Apr 2023 05:30 )   PT: 14.3 sec;   INR: 1.20          PTT - ( 27 Apr 2023 05:30 )  PTT:26.2 sec  CAPILLARY BLOOD GLUCOSE            MICRODATA:    Culture - Urine (collected 25 Apr 2023 15:33)  Source: Clean Catch Clean Catch (Midstream)  Final Report (27 Apr 2023 08:46):    Specimen appears CONTAMINATED. Lab suggests repeat clean catch specimen.        RADIOLOGY/OTHER STUDIES:

## 2023-04-28 NOTE — PROGRESS NOTE ADULT - ASSESSMENT
83 female PMH HTN dementia presented to the ED with complaints of painless jaundice x1 week  Nephrology consulted for hyponatremia management.     Assessment/Plan:   #acute on chronic hyponatremia, euvolemic   asymptomatic   possibly SIADH given high Sofía/uOsm, clinical picture   remains hyponatremic  Urine Osm 1700 unlikely, as max range usually 1200, GI did not use contrast for study, no other identifiable source    Recommend   would continue with 1L fluid restriction   continue w/ NS as patient receiving multiple medication with d5w   repeat Urine Osm as prior value likely an error,   continue q6H BMP, urine Na, urine osm   encourage PO intake as tolerable

## 2023-04-29 NOTE — PROGRESS NOTE ADULT - SUBJECTIVE AND OBJECTIVE BOX
Patient is a 83y old  Female who presents with a chief complaint of Anemia concern for hemolysis, SIRS (28 Apr 2023 16:25)    INTERVAL EVENTS: Lowe grade fever 100.9*F last evening, BCx x2 taken, and now on Vanco and Meropenem    SUBJECTIVE:  Patient was seen and examined at bedside. Daughter Elly at bedside, reports no vomiting but poor appetite and very minimal oral intake. No pain.     Review of systems: No fever, chills, dizziness, HA, Changes in vision, CP, dyspnea, nausea or vomiting, dysuria, changes in bowel movements, LE edema. Rest of 12 point Review of systems negative unless otherwise documented elsewhere in note.     Diet, NPO:   Except Medications  With Ice Chips/Sips of Water (04-28-23 @ 11:19) [Active]      MEDICATIONS:  MEDICATIONS  (STANDING):  magnesium sulfate  IVPB 2 Gram(s) IV Intermittent once  meropenem  IVPB 1000 milliGRAM(s) IV Intermittent every 8 hours  metoclopramide Injectable 10 milliGRAM(s) IV Push every 6 hours  pantoprazole  Injectable 40 milliGRAM(s) IV Push every 12 hours  potassium phosphate IVPB 15 milliMole(s) IV Intermittent once  vancomycin  IVPB 500 milliGRAM(s) IV Intermittent every 12 hours    MEDICATIONS  (PRN):      Allergies    No Known Allergies    Intolerances        OBJECTIVE:  Vital Signs Last 24 Hrs  T(C): 36.7 (29 Apr 2023 05:41), Max: 38.3 (28 Apr 2023 17:28)  T(F): 98.1 (29 Apr 2023 05:41), Max: 100.9 (28 Apr 2023 17:28)  HR: 84 (29 Apr 2023 05:41) (84 - 101)  BP: 112/72 (29 Apr 2023 05:41) (109/74 - 131/82)  BP(mean): --  RR: 18 (29 Apr 2023 05:41) (18 - 18)  SpO2: 100% (29 Apr 2023 05:41) (86% - 100%)    Parameters below as of 29 Apr 2023 05:41    O2 Flow (L/min): 2    I&O's Summary      PHYSICAL EXAM:  Gen: Reclining in bed at time of exam, appears stated age, awake, alert,   HEENT: NCAT, MMM, clear OP, icteric sclerae  Neck: supple, trachea at midline  CV: RRR, +S1/S2  Pulm: adequate respiratory effort, no increase in work of breathing  Abd: mildly distended, normal BS, soft, NT   Skin: warm and dry, Jaundice   Ext: WWP, no LE edema  Neuro: AOx0,no gross focal neurological deficits  Psych: affect and behavior appropriate, pleasant at time of interview  :     LABS:                        8.0    14.31 )-----------( 211      ( 29 Apr 2023 05:30 )             26.2     04-29    131<L>  |  104  |  14  ----------------------------<  132<H>  3.8   |  21<L>  |  0.53    Ca    7.9<L>      29 Apr 2023 05:30  Phos  2.1     04-29  Mg     1.8     04-29    TPro  5.1<L>  /  Alb  2.3<L>  /  TBili  12.5<H>  /  DBili  x   /  AST  118<H>  /  ALT  87<H>  /  AlkPhos  448<H>  04-29    LIVER FUNCTIONS - ( 29 Apr 2023 05:30 )  Alb: 2.3 g/dL / Pro: 5.1 g/dL / ALK PHOS: 448 U/L / ALT: 87 U/L / AST: 118 U/L / GGT: x             CAPILLARY BLOOD GLUCOSE        Urinalysis Basic - ( 28 Apr 2023 11:59 )    Color: Yellow / Appearance: Clear / SG: >=1.030 / pH: x  Gluc: x / Ketone: 15 mg/dL  / Bili: Large / Urobili: 1.0 E.U./dL   Blood: x / Protein: 100 mg/dL / Nitrite: NEGATIVE   Leuk Esterase: NEGATIVE / RBC: < 5 /HPF / WBC < 5 /HPF   Sq Epi: x / Non Sq Epi: x / Bacteria: Present /HPF        MICRODATA:    Culture - Blood (collected 28 Apr 2023 18:20)  Source: .Blood Blood  Preliminary Report (29 Apr 2023 07:01):    No growth at 12 hours    Culture - Blood (collected 28 Apr 2023 18:10)  Source: .Blood Blood  Preliminary Report (29 Apr 2023 07:01):    No growth at 12 hours    Urinalysis with Rflx Culture (collected 28 Apr 2023 11:59)        RADIOLOGY/OTHER STUDIES:

## 2023-04-29 NOTE — PROGRESS NOTE ADULT - SUBJECTIVE AND OBJECTIVE BOX
CC: JAUNDICE; ANEMIA; HYPONATREMIA        INTERVAL HISTORY: lying comfortably in bed  in NAD      ROS: No chest pain, no sob, no abd pain. No n/v/d    PAST MEDICAL & SURGICAL HISTORY:      PHYSICAL EXAM:  T(C): 36.7 (04-29-23 @ 05:41), Max: 38.3 (04-28-23 @ 17:28)  HR: 84 (04-29-23 @ 05:41)  BP: 112/72 (04-29-23 @ 05:41) (109/74 - 131/82)  RR: 18 (04-29-23 @ 05:41)  SpO2: 100% (04-29-23 @ 05:41)  Wt(kg): --  I&O's Summary    Weight 44.9 (04-27 @ 08:37)  General: AAO x 3,  NAD.  HEENT: moist mucous membranes, no pallor/cyanosis.  Neck: no JVD visible.  Cardiac: S1, S2. RRR. No murmurs   Respratory: CTA b/l, no access muscle use.   Abdomen: soft. nontender. nondistended  Skin: no rashes.  Extremities: no LE edema b/l  Access:       DATA:                        8.0<L>  14.31<H> )-----------( 211      ( 29 Apr 2023 05:30 )             26.2<L>    Ferritin, Serum: 775 ng/mL *H* (04-25 @ 12:12)      131<L>  |  104    |  14     ----------------------------<  132<H>  Ca:7.9<L> (29 Apr 2023 05:30)  3.8     |  21<L>  |  0.53         TPro  5.1<L>  /  Alb  2.3<L>  /  TBili  12.5<H>  /  DBili  x      /  AST  118<H>  /  ALT  87<H>  /  AlkPhos  448<H>  29 Apr 2023 05:30    Osmolality, Serum: 269 mosm/kg *L* (04-27 @ 16:57)      Urinalysis Basic - ( 28 Apr 2023 11:59 )  Color: Yellow / Appearance: Clear / SG: >=1.030 / pH: x  Gluc: x / Ketone: 15 mg/dL<!>  / Bili: Large<!> / Urobili: 1.0 E.U./dL   Blood: x / Protein: 100 mg/dL<!> / Nitrite: NEGATIVE   Leuk Esterase: NEGATIVE / RBC: < 5 /HPF / WBC < 5 /HPF   Sq Epi: x / Non Sq Epi: x / Bacteria: Present /HPF<!>      Sodium, Random Urine: 189 mmol/L (04-27 @ 18:33)  Osmolality, Random Urine: 1702 mosm/kg (04-27 @ 18:33)            MEDICATIONS  (STANDING):  magnesium sulfate  IVPB 2 Gram(s) IV Intermittent once  meropenem  IVPB 1000 milliGRAM(s) IV Intermittent every 8 hours  metoclopramide Injectable 10 milliGRAM(s) IV Push every 6 hours  pantoprazole  Injectable 40 milliGRAM(s) IV Push every 12 hours  potassium phosphate IVPB 15 milliMole(s) IV Intermittent once  vancomycin  IVPB 500 milliGRAM(s) IV Intermittent every 12 hours    MEDICATIONS  (PRN):

## 2023-04-29 NOTE — PROGRESS NOTE ADULT - PROBLEM SELECTOR PLAN 1
Etiology probably multifactorial with possible SIADH given high Uosm, pulmonary metastases, and low uric acid  also poor PO intake contributing to hyponatremia  GI to advance diet when possible  in the interim, continue fluid restriction

## 2023-04-29 NOTE — CHART NOTE - NSCHARTNOTEFT_GEN_A_CORE
PALLIATIVE MEDICINE COORDINATION OF CARE NOTE FOR [facility]  [  ] ED Trigger   [  ] MICU Trigger     [x  ] Consult    Patient last assessed: _4/28/23____  to manage: GOC/AD, Symptoms, and Support was recommended:___4/28/23___    ___40___ Minutes; Start: _1000____  End: __1040__, of non-face-to-face prolonged service provided that relates to (face-to-face) care that has or will occur and ongoing patient management, including one or more of the following:   - Reviewed records from other physicians or other health care professional services, including one or more of the following: other medical records and diagnostic / radiology study results     HPI:  Consult reason: hyponatremia, anemia  ED vitals: T 98.6  -->98  RR 18 /61 SpO2 94% RA  Labs signficant: WBC 22.35, 92% neutrophils, hgb 5.6, MCV 94.6, Na 122, serum osm 261, , , , total protein 5.8, albumin 2.8, total bili 10.8, direct bili 8.2, indirect bili 2.6, lipase 12  Imaging/EKG: CXR w patchy b/l opacities, ekg w sinus tachycardia to 122  Interventions: 1 L NS, pantoprazole IV 80mg    HPI: 82 y/o Cantonese speaking, history obtained from daughter, F PMH HTN, dementia presents with 1 week of jaundice. She presented to her out patient GI, Dr. Aguero, and he referred her to our ED due to jaundice. She has had yellowing of the skin of 1 week and worsening fatigue over the same time. She usually ambulates with assistance but for the past 1 day she has not been too tired to get up. Other then fatigue the daughter notes no change in mental status. Although the patient denied pain, her daughter has seen her intermittently holding her stomach in discomfort. She has had reduced PO intake and unspecified amount of weight loss which the daughter contributes to not eating as much. She denies any black stools, BRBPR, diarrhea, vomiting, f/c, SOB, CP.    One month ago her hgb was 9.7, ALP 56, AST 18, ALT 11, total protein 6.8, Albumin 3.8, total bilirubin <0.2 (25 Apr 2023 19:50)      - Other: [facility] reviewed.    - Other: Medication reviewed.    The patient HAS NOT used PRN's in the last 24h.    MEDICATIONS  (STANDING):    MEDICATIONS  (PRN):      - Other: Advanced directives     Full Code     No documented MOLST form found on [facility]     No documented HCP form found on [facility]     No Living will / POA / Advance directives found on [place] / [facility].     No documented GOC notes on [facility]    - Other: Coordination/Plan of care     __2__ admissions in 1 year     Current admission LOS: __3_ days     LACE score: __13__ ADVANCE ILLNESS PATIENT.     Patient NOT previously seen by palliative medicine consult service.      Discussed with  Consult request for: " Pancreatic Cancer. Advanced Goals of care Discussions   "    Patient is an Advanced Illness patient hence the primary team will need to complete GOC document of any prior GOC discussions that were done during this admission so far as well as information available for Proxy/surrogates/NOK/guardians prior to a palliative contact.

## 2023-04-29 NOTE — PROGRESS NOTE ADULT - ASSESSMENT
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

## 2023-04-29 NOTE — CHART NOTE - NSCHARTNOTEFT_GEN_A_CORE
Infectious Diseases Anti-infective Approval Note    Medication: meropenem  Dose: 1g  Route: IV  Frequency: q8h  Duration**: 3 days    *THIS IS NOT AN INFECTIOUS DISEASES CONSULTATION*    **Indicates duration of approval, not necessarily duration of treatment**

## 2023-04-29 NOTE — PROGRESS NOTE ADULT - ASSESSMENT
83 y/oF  Cantonese speaking PMH Dementia,  HTN, LEW ( baseline Hgb 9.7), presented with 1 week of jaundice, fatigue and decreased po intake. Pt was seen by PMD Dr. Kary Ly on 4/24/23, blood test showed Tbili 10.8, , , , WBC 22.8K, Hgb 6, and Serum Sodium 125.Pt had normal LFT one month ago ( TBili<0.2, ALP 56, AST 18 and ALT 11).  Pt was sent to [facility]/Dr. Yonas Evans whom referred pt to the hospital for further management. CTAP reviewed w. 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    # Painless jaundice   # Necrotic mass at the uncinate process of pancreas likely advanced Pancreatic carcinoma   # Malignant CBD obstruction   # Pulm nodules likely Pulm mets   # Severe Hyponatremia/SIADH   # Failure to thrive   # Dementia   # Leukocytosis /possible cholangitis 2/2 biliary obstruction   # Severe Anemia   # N/V   # Abd pain   # Duodenal ulcer/ Gwendolyn Ruano tear  # Coagulopathy   - [facility] SNa+ 120->127( 4/26)  after 1L IVF, and s/p 2u RPBC ( 4/25) for anemia with Hgb 5.7-> 8.8 ( 4/26) , hemodynamically stable, transferred to O ( 4/26)   -  INR 1.95( 4/25)> 2 ( 4/26)-> 1.2( 4/27) likely 2/2 Vit K deficiency, given Vitamin K 10mg iv ( 4/26), repeat INR 1.2, met the goal <1.5 for ERCP ( 4/27)   - GI f/u appreciated, s/p EUS guided Bx and attempted ERCP ( 4/27) : found Gwendolyn Ruano tear and DU, fungating and ulcerated mass ~ 6cm, not amenable for biliary stent. s/p biopsied. f/u path ( please call Path dept to expedite result) and plan on endoscopic choledochoduodenostomy ( stent) early next week ( tentatively Monday 5/1)   - c/w PPI iv bid   - c/w Reglan 10mg iv q6hr as pro-motility agent   - check tumor markers: CA 19-9=306 and CEA level =6.3  - Renal f/u appreciated, 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, f/u TSH and am cortisol ok , trend BMP SNa+ 131 this am ( 4/29) stable   - Pt on full liquid diet as tolerated ( no need for fluid restriction as pt not taking in liquid more than ~ 1L )  -trend BMP SCr 127(4/26)-. 125( 4/27)-> 128( 4/28) -> 131( 4/29)   - transfused 2U PRBC, keep Hgb>7, Hgb stable 9.1  - Leukocytosis: c/w Zosyn empirically ( 4/25-4/28), changed to Meropenem 1gm iv q8hr  Day 5 for cholangitis, trend WBC ,Leukocytosis could also be 2/2 leukemoid reaction from large necrotic tumor, WBC 18.68K( 4/27) -> 21.23K( 4/28)- >14K( 4/29)   - f/u BCx : no growth for 12hrs  - trend LFT for transaminitis/obstructive pattern, TBIli 11.8, , , ALT 96  -  viral hepatitis serology negative   - Heme Onc f/u appreciated, not a candidate for chemotherapy given frailed condition  - Palliative care consult appreciated for [facility] discussion ( initiated),Pt is DNR/DNI, pt not a candidate for home hospice due to lack of family support  - Rehab consult [facility] rec: [facility] Vs home with home PT with increased HHA for assistance   - code status: DNR/DNI ( pt has living will) and HCP previously completed , will complete MOLST form and make copy of HCP/living will for record  1st HCP: Alessandro Mares ( )  Alternate HCP: Elly Mares ( daughter)   - VTE ppx; SCDs    Contact:  Elly Marse ( daughter) [phone]   PMD: Dr. Kary Ozuna [phone]  GI: Dr. Yonas Evans/[facility] [phone]    Disposition: medically active, d/c plan early next week awaiting Path and biliary decompression/Choledochoduodenostomy on Tues 5/2   SW consult, pt needs wheelchair, rolling walker, and increasing HHA ( pt will benefit 24/7 HHA and at least 12hr HHA given frailed condition from this newly dx advanced metastatic pancreatic Ca w/ lung mets and biliary obs and FTT) Will further discuss with Palliative care why pt not a candidate for home hospice     POC as d/w daughter Elly and 7WO team Dr.Alexandra Brown and Dr. Tamika Rhodes.

## 2023-04-30 NOTE — SWALLOW BEDSIDE ASSESSMENT ADULT - SLP PERTINENT HISTORY OF CURRENT PROBLEM
83 y/oF  Cantonese speaking PMH Dementia,  HTN, LEW ( baseline Hgb 9.7), presented with 1 week of jaundice, fatigue and decreased po intake. Pt was seen by PMD Dr. Kary Ly on 4/24/23, blood test showed Tbili 10.8, , , , WBC 22.8K, Hgb 6, and Serum Sodium 125.Pt had normal LFT one month ago ( TBili<0.2, ALP 56, AST 18 and ALT 11).

## 2023-04-30 NOTE — PROGRESS NOTE ADULT - ASSESSMENT
83 y/oF  Cantonese speaking PMH Dementia,  HTN, LEW ( baseline Hgb 9.7), presented with 1 week of jaundice, fatigue and decreased po intake. Pt was seen by PMD Dr. Kary Ly on 4/24/23, blood test showed Tbili 10.8, , , , WBC 22.8K, Hgb 6, and Serum Sodium 125.Pt had normal LFT one month ago ( TBili<0.2, ALP 56, AST 18 and ALT 11).  Pt was sent to [facility]/Dr. Yonas Evans whom referred pt to the hospital for further management. CTAP reviewed w. 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    # Painless jaundice   # Necrotic mass at the uncinate process of pancreas likely advanced Pancreatic carcinoma   # Malignant CBD obstruction   # Pulm nodules likely Pulm mets   # Severe Hyponatremia/SIADH   # Failure to thrive   # Dementia   # Leukocytosis /possible cholangitis 2/2 biliary obstruction   # Severe Anemia   # N/V   # Abd pain   # Duodenal ulcer/ Gwendolyn Ruano tear  # Coagulopathy   - [facility] SNa+ 120->127( 4/26)  after 1L IVF, and s/p 2u RPBC ( 4/25) for anemia with Hgb 5.7-> 8.8 ( 4/26) , hemodynamically stable, transferred to O ( 4/26)   -  INR 1.95( 4/25)> 2 ( 4/26)-> 1.2( 4/27) likely 2/2 Vit K deficiency, given Vitamin K 10mg iv ( 4/26), repeat INR 1.2, met the goal <1.5 for ERCP ( 4/27)   - GI f/u appreciated, s/p EUS guided Bx and attempted ERCP ( 4/27) : found Gwendolyn Ruano tear and DU, fungating and ulcerated mass ~ 6cm, not amenable for biliary stent. s/p biopsied. f/u path ( please call Path dept to expedite result) and plan on endoscopic choledochoduodenostomy ( stent) early next week ( tentatively Monday 5/1)   - c/w PPI iv bid   - c/w Reglan 10mg iv q6hr as pro-motility agent   - check tumor markers: CA 19-9=306 and CEA level =6.3  - Renal f/u appreciated, 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, f/u TSH and am cortisol ok , trend BMP SNa+ 131 ( 4/29)-> 134 (4/30)   - Pt on full liquid diet as tolerated ( no need for fluid restriction as pt not taking in liquid more than ~ 1L )  -trend BMP SCr 127(4/26)-. 125( 4/27)-> 128( 4/28) -> 131( 4/29)-> 134( 4/30)    - transfused 2U PRBC, keep Hgb>7, Hgb stable 9.1  - Leukocytosis: c/w Zosyn empirically ( 4/25-4/28), changed to Meropenem 1gm iv q8hr  Day 5 for cholangitis, trend WBC ,Leukocytosis could also be 2/2 leukemoid reaction from large necrotic tumor, WBC 18.68K( 4/27) -> 21.23K( 4/28)- >14K( 4/29)-> 17.38( 4/30)   - f/u BCx : no growth for 12hrs  - ID approval appreciated from , will get ID consult given pt on such broad spectrum iv abx on Meropenem  - trend LFT for transaminitis/obstructive pattern, TBIli 11.8-> 12.6, - >504, -> 124, ALT 96-> 91  -  viral hepatitis serology negative   - Heme Onc f/u appreciated, not a candidate for chemotherapy given frailed condition  - Palliative care consult appreciated for GOC discussion ( initiated),Pt is DNR/DNI, pt not a candidate for home hospice due to lack of family support  - Rehab consult [facility] rec: [facility] Vs home with home PT with increased HHA for assistance   - code status: DNR/DNI ( pt has living will) and HCP previously completed , will complete MOLST form and make copy of HCP/living will for record  1st HCP: Alessandro Mares ( )  Alternate HCP: Elly Mares ( daughter)   - VTE ppx; SCDs    Contact:  Elly Mares ( daughter) [phone]   PMD: Dr. Kary Ozuna [phone]  GI: Dr. Yonas Evans/[facility] [phone]    Disposition: medically active, d/c plan early next week awaiting Path and biliary decompression/Choledochoduodenostomy on Tues 5/2   SW consult, pt needs wheelchair, rolling walker, and increasing HHA ( pt will benefit 24/7 HHA and at least 12hr HHA given frailed condition from this newly dx advanced metastatic pancreatic Ca w/ lung mets and biliary obs and FTT) Will further discuss with Palliative care why pt not a candidate for home hospice     POC as d/w daughter Elly and 7WO team Dr.Alexandra Brown

## 2023-04-30 NOTE — PROGRESS NOTE ADULT - PROBLEM SELECTOR PLAN 8
F: Fluid restriction.   E: K > 4, Mg > 2.   N: NPO, pending mental status improvement.   DVT px: SCDs due to low hgb.   Dispo: [address] [place] [address]

## 2023-04-30 NOTE — PROGRESS NOTE ADULT - SUBJECTIVE AND OBJECTIVE BOX
Patient is a 83y old  Female who presents with a chief complaint of Anemia concern for hemolysis, SIRS (30 Apr 2023 07:51)    INTERVAL EVENTS:NAEON    SUBJECTIVE:  Patient was seen and examined at bedside. Resting in bed comfortably. Denies abd fullness or pain, no N/V reported. Daughter Elly at bedside.     Review of systems: No fever, chills, dizziness, HA, Changes in vision, CP, dyspnea, nausea or vomiting, dysuria, changes in bowel movements, LE edema. Rest of 12 point Review of systems negative unless otherwise documented elsewhere in note.     Diet, NPO:   Except Medications  With Ice Chips/Sips of Water (04-28-23 @ 11:19) [Active]      MEDICATIONS:  MEDICATIONS  (STANDING):  meropenem  IVPB 1000 milliGRAM(s) IV Intermittent every 8 hours  metoclopramide Injectable 10 milliGRAM(s) IV Push every 6 hours  pantoprazole  Injectable 40 milliGRAM(s) IV Push every 12 hours  potassium phosphate IVPB 30 milliMole(s) IV Intermittent once  vancomycin  IVPB 750 milliGRAM(s) IV Intermittent every 12 hours    MEDICATIONS  (PRN):      Allergies    No Known Allergies    Intolerances        OBJECTIVE:  Vital Signs Last 24 Hrs  T(C): 36.6 (30 Apr 2023 05:25), Max: 36.7 (29 Apr 2023 12:00)  T(F): 97.9 (30 Apr 2023 05:25), Max: 98.1 (29 Apr 2023 12:00)  HR: 74 (30 Apr 2023 05:25) (74 - 81)  BP: 119/63 (30 Apr 2023 05:25) (115/74 - 119/63)  BP(mean): --  RR: 16 (30 Apr 2023 05:25) (16 - 18)  SpO2: 100% (30 Apr 2023 05:25) (97% - 100%)    Parameters below as of 30 Apr 2023 05:25    O2 Flow (L/min): 2    I&O's Summary      PHYSICAL EXAM:  Gen: Reclining in bed at time of exam, appears stated age  HEENT: NCAT, MMM, clear OP  Neck: supple, trachea at midline  CV: RRR, +S1/S2  Pulm: adequate respiratory effort, no increase in work of breathing  Abd: abd fullness, normal bowel sounds, soft, NT   Skin: warm and dry,   Ext: WWP, no LE edema  Neuro: AOx1, no gross focal neurological deficits  Psych: affect and behavior appropriate, pleasant at time of interview    LABS:                        8.3    17.38 )-----------( 208      ( 30 Apr 2023 08:51 )             27.6     04-30    134<L>  |  102  |  12  ----------------------------<  105<H>  4.0   |  22  |  0.48<L>    Ca    8.5      30 Apr 2023 08:51  Phos  2.0     04-30  Mg     2.2     04-30    TPro  5.6<L>  /  Alb  2.4<L>  /  TBili  12.6<H>  /  DBili  x   /  AST  124<H>  /  ALT  91<H>  /  AlkPhos  504<H>  04-30    LIVER FUNCTIONS - ( 30 Apr 2023 08:51 )  Alb: 2.4 g/dL / Pro: 5.6 g/dL / ALK PHOS: 504 U/L / ALT: 91 U/L / AST: 124 U/L / GGT: x             CAPILLARY BLOOD GLUCOSE        Urinalysis Basic - ( 28 Apr 2023 11:59 )    Color: Yellow / Appearance: Clear / SG: >=1.030 / pH: x  Gluc: x / Ketone: 15 mg/dL  / Bili: Large / Urobili: 1.0 E.U./dL   Blood: x / Protein: 100 mg/dL / Nitrite: NEGATIVE   Leuk Esterase: NEGATIVE / RBC: < 5 /HPF / WBC < 5 /HPF   Sq Epi: x / Non Sq Epi: x / Bacteria: Present /HPF        MICRODATA:    Culture - Blood (collected 28 Apr 2023 18:20)  Source: .Blood Blood  Preliminary Report (29 Apr 2023 19:00):    No growth at 1 day.    Culture - Blood (collected 28 Apr 2023 18:10)  Source: .Blood Blood  Preliminary Report (29 Apr 2023 19:00):    No growth at 1 day.    Urinalysis with Rflx Culture (collected 28 Apr 2023 11:59)        RADIOLOGY/OTHER STUDIES:

## 2023-04-30 NOTE — PROGRESS NOTE ADULT - PROBLEM SELECTOR PLAN 1
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 GI and Surgery consultation placed with plans for ERCP with CBD stenting/biopsy for diagnosis.   CTAP 4/25: cystic/necrotic lesion in uncinate process of pancreas 6x10cm, CBD dilation 2.4cm, pulmonary nodules suspicious for metastasis.   Patient now s/p EUS-ERCP on 4/27 for biliary stent, unable to place due to mass morphology now pending bx reports for possible choledocoduodenostomy early next week.     Plan:   - NPO per SS, could not assess will return tomorrow  - GOC conversations  - Need pathology report  - possible surgical planning next week   - monitor for post ERCP complications (CBC, pain control/monitoring)   - Patient is DNR/DNI (Living will scanned into chart)     # Obstructive Jaundice  - patient with new fever, now on meropenem and vanocymcin

## 2023-04-30 NOTE — PROGRESS NOTE ADULT - SUBJECTIVE AND OBJECTIVE BOX
###INCOMPLETE###    O/N Events:    Subjective/ROS: Patient seen and examined at bedside. Denies fevers, chills, HA, CP, SOB, n/v, changes in bowel/urinary habits.  12pt ROS otherwise negative.    VITALS  Vital Signs Last 24 Hrs  T(C): 36.7 (30 Apr 2023 13:16), Max: 36.7 (29 Apr 2023 20:23)  T(F): 98.1 (30 Apr 2023 13:16), Max: 98.1 (29 Apr 2023 20:23)  HR: 80 (30 Apr 2023 13:16) (74 - 80)  BP: 115/69 (30 Apr 2023 13:16) (115/69 - 119/63)  BP(mean): --  RR: 16 (30 Apr 2023 13:16) (16 - 16)  SpO2: 100% (30 Apr 2023 13:16) (97% - 100%)    Parameters below as of 30 Apr 2023 13:16  Patient On (Oxygen Delivery Method): nasal cannula  O2 Flow (L/min): 2      CAPILLARY BLOOD GLUCOSE          PHYSICAL EXAM  General: NAD  Head: NC/AT; MMM; PERRL; EOMI;  Neck: Supple; no JVD  Respiratory: CTAB; no wheezes/rales/rhonchi  Cardiovascular: Regular rhythm/rate; S1/S2+, no murmurs, rubs gallops   Gastrointestinal: Soft; NTND; bowel sounds normal and present  Extremities: WWP; no edema/cyanosis  Neurological: A&Ox3, CNII-XII grossly intact; no obvious focal deficits    MEDICATIONS  (STANDING):  meropenem  IVPB 1000 milliGRAM(s) IV Intermittent every 8 hours  metoclopramide Injectable 10 milliGRAM(s) IV Push every 6 hours  pantoprazole  Injectable 40 milliGRAM(s) IV Push every 12 hours  vancomycin  IVPB 750 milliGRAM(s) IV Intermittent every 12 hours    MEDICATIONS  (PRN):      No Known Allergies      LABS                        8.3    17.38 )-----------( 208      ( 30 Apr 2023 08:51 )             27.6     04-30    134<L>  |  102  |  12  ----------------------------<  105<H>  4.0   |  22  |  0.48<L>    Ca    8.5      30 Apr 2023 08:51  Phos  2.0     04-30  Mg     2.2     04-30    TPro  5.6<L>  /  Alb  2.4<L>  /  TBili  12.6<H>  /  DBili  x   /  AST  124<H>  /  ALT  91<H>  /  AlkPhos  504<H>  04-30                IMAGING/EKG/ETC   Subjective/ROS: Patient seen and examined at bedside, awake and alert this morning, interative. Daughter notes she looks well this AM. Denies fevers, chills, HA, CP, SOB, n/v, changes in bowel/urinary habits.  12pt ROS otherwise negative.    VITALS  Vital Signs Last 24 Hrs  T(C): 36.7 (30 Apr 2023 13:16), Max: 36.7 (29 Apr 2023 20:23)  T(F): 98.1 (30 Apr 2023 13:16), Max: 98.1 (29 Apr 2023 20:23)  HR: 80 (30 Apr 2023 13:16) (74 - 80)  BP: 115/69 (30 Apr 2023 13:16) (115/69 - 119/63)  BP(mean): --  RR: 16 (30 Apr 2023 13:16) (16 - 16)  SpO2: 100% (30 Apr 2023 13:16) (97% - 100%)    Parameters below as of 30 Apr 2023 13:16  Patient On (Oxygen Delivery Method): nasal cannula  O2 Flow (L/min): 2      CAPILLARY BLOOD GLUCOSE          PHYSICAL EXAM  PHYSICAL EXAM:  Constitutional: elderly female, jaundiced, NAD, comfortable in bed.  HEENT: NC/AT, EOMI, scleral icterus+, dry MM  Neck: Supple, no JVD  Respiratory: CTA B/L. No w/r/r.   Cardiovascular: RRR, normal S1 and S2, no m/r/g.   Gastrointestinal: +BS, soft NTND, no guarding or rebound tenderness, no palpable masses   Extremities: wwp; no cyanosis, clubbing or edema.   Vascular: Pulses equal and strong throughout.   Neurological: AAOx1, no CN deficits, strength and sensation intact throughout.   Skin: jaundiced  MEDICATIONS  (STANDING):  meropenem  IVPB 1000 milliGRAM(s) IV Intermittent every 8 hours  metoclopramide Injectable 10 milliGRAM(s) IV Push every 6 hours  pantoprazole  Injectable 40 milliGRAM(s) IV Push every 12 hours  vancomycin  IVPB 750 milliGRAM(s) IV Intermittent every 12 hours    MEDICATIONS  (PRN):      No Known Allergies      LABS                        8.3    17.38 )-----------( 208      ( 30 Apr 2023 08:51 )             27.6     04-30    134<L>  |  102  |  12  ----------------------------<  105<H>  4.0   |  22  |  0.48<L>    Ca    8.5      30 Apr 2023 08:51  Phos  2.0     04-30  Mg     2.2     04-30    TPro  5.6<L>  /  Alb  2.4<L>  /  TBili  12.6<H>  /  DBili  x   /  AST  124<H>  /  ALT  91<H>  /  AlkPhos  504<H>  04-30                IMAGING/EKG/ETC

## 2023-04-30 NOTE — SWALLOW BEDSIDE ASSESSMENT ADULT - SLP GENERAL OBSERVATIONS
Pt received asleep with daughter at bedside. Pt briefly aroused given daughter's repetitive verbal and tactile stim, however, mentation waned. Pt was not following commands.

## 2023-04-30 NOTE — SWALLOW BEDSIDE ASSESSMENT ADULT - ORAL PREPARATORY PHASE
Pt with reduced oral grading and anterior spillage of thin liquid trial. Pt pursed lips in response to puree trial.

## 2023-04-30 NOTE — PROGRESS NOTE ADULT - PROBLEM SELECTOR PLAN 4
Tachcyardia to 122 and leukocytosis to 20. S/p empiric vancomycin and Zosyn and Fluconazole in ED. At this time no indication to continue fluconazole. CT evidence with large necrotic mass as above and pulmonary mets, surgery consult with no concerns for perforation. No active signs of infection.   - Continue Zosyn 3.375 mg IV [id number] ascending cholangitis ppx

## 2023-04-30 NOTE — PROGRESS NOTE ADULT - SUBJECTIVE AND OBJECTIVE BOX
CC: JAUNDICE; ANEMIA; HYPONATREMIA        INTERVAL HISTORY: lying in bed in NAD  asleep      ROS: No chest pain, no sob, no abd pain. No n/v/d    PAST MEDICAL & SURGICAL HISTORY:      PHYSICAL EXAM:  T(C): 36.6 (04-30-23 @ 05:25), Max: 36.7 (04-29-23 @ 20:23)  HR: 74 (04-30-23 @ 05:25)  BP: 119/63 (04-30-23 @ 05:25) (118/75 - 119/63)  RR: 16 (04-30-23 @ 05:25)  SpO2: 100% (04-30-23 @ 05:25)  Wt(kg): --  I&O's Summary    Weight 44.9 (04-27 @ 08:37)  General,  NAD.  HEENT: moist mucous membranes, no pallor/cyanosis.  Neck: no JVD visible.  Cardiac: S1, S2. RRR. No murmurs   Respratory: CTA b/l, no access muscle use.   Abdomen: soft. nontender. nondistended  Skin: no rashes.  Extremities: trace  LE edema b/l  Access:       DATA:                        8.0<L>  14.31<H> )-----------( 211      ( 29 Apr 2023 05:30 )             26.2<L>    Ferritin, Serum: 775 ng/mL *H* (04-25 @ 12:12)      131<L>  |  104    |  14     ----------------------------<  132<H>  Ca:7.9<L> (29 Apr 2023 05:30)  3.8     |  21<L>  |  0.53         TPro  5.1<L>  /  Alb  2.3<L>  /  TBili  12.5<H>  /  DBili  x      /  AST  118<H>  /  ALT  87<H>  /  AlkPhos  448<H>  29 Apr 2023 05:30        Urinalysis Basic - ( 28 Apr 2023 11:59 )  Color: Yellow / Appearance: Clear / SG: >=1.030 / pH: x  Gluc: x / Ketone: 15 mg/dL<!>  / Bili: Large<!> / Urobili: 1.0 E.U./dL   Blood: x / Protein: 100 mg/dL<!> / Nitrite: NEGATIVE   Leuk Esterase: NEGATIVE / RBC: < 5 /HPF / WBC < 5 /HPF   Sq Epi: x / Non Sq Epi: x / Bacteria: Present /HPF<!>                MEDICATIONS  (STANDING):  meropenem  IVPB 1000 milliGRAM(s) IV Intermittent every 8 hours  metoclopramide Injectable 10 milliGRAM(s) IV Push every 6 hours  pantoprazole  Injectable 40 milliGRAM(s) IV Push every 12 hours    MEDICATIONS  (PRN):

## 2023-04-30 NOTE — SWALLOW BEDSIDE ASSESSMENT ADULT - SWALLOW EVAL: DIAGNOSIS
Pt was seen for a swallow evaluation. Pt's mentation was suboptimal as pt was unable to maintain alertness which contributed to signs of oral dysphagia with minimal trials c/b anterior spillage and minimal to absent bolus prep/transport. Pt is not a candidate for a PO diet at this time due to mentation. Recommend cont. NPO with short-term alternate means of nutrition/hydration. Given pt's age, dx of dementia, large pancreatic mass with c/f mets, mental status, and recent decreased PO intake, pt would benefit from GOC discussion re: nutrition goals. SLP will follow-up within 24hrs to reassess.

## 2023-04-30 NOTE — PROGRESS NOTE ADULT - PROBLEM SELECTOR PLAN 1
slight improvement in serum Na  Failure to thrive in light of probable Stage 4 pancreatic cancer  check Uosm, Lemuel  for decompression/Choledochoduodenostomy on Tues 5/2

## 2023-05-01 NOTE — PROGRESS NOTE ADULT - ASSESSMENT
82 y/o Cantonese speaking, history obtained from daughter, F PMH HTN, dementia presents with 1 week of jaundice. GI consulted for jaundice.     #Jaundice  #Uncinate process pancreas mass  reviewed imaging with Dr. 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

## 2023-05-01 NOTE — PROGRESS NOTE ADULT - PROBLEM SELECTOR PLAN 7
F: Fluid restriction.   E: K > 4, Mg > 2.   N: NPO, pending mental status improvement.   DVT px: SCDs due to low hgb.   Dispo: [address] [place] [address]

## 2023-05-01 NOTE — PROGRESS NOTE ADULT - SUBJECTIVE AND OBJECTIVE BOX
###INCOMPLETE###    O/N Events:    Subjective/ROS: Patient seen and examined at bedside. Denies fevers, chills, HA, CP, SOB, n/v, changes in bowel/urinary habits.  12pt ROS otherwise negative.    VITALS  Vital Signs Last 24 Hrs  T(C): 36.4 (01 May 2023 05:43), Max: 37 (30 Apr 2023 20:55)  T(F): 97.6 (01 May 2023 05:43), Max: 98.6 (30 Apr 2023 20:55)  HR: 83 (01 May 2023 05:43) (80 - 92)  BP: 131/81 (01 May 2023 05:43) (115/69 - 131/84)  BP(mean): --  RR: 18 (01 May 2023 05:43) (16 - 20)  SpO2: 98% (01 May 2023 05:43) (98% - 100%)    Parameters below as of 01 May 2023 05:43    O2 Flow (L/min): 2      CAPILLARY BLOOD GLUCOSE          PHYSICAL EXAM  General: NAD  Head: NC/AT; MMM; PERRL; EOMI;  Neck: Supple; no JVD  Respiratory: CTAB; no wheezes/rales/rhonchi  Cardiovascular: Regular rhythm/rate; S1/S2+, no murmurs, rubs gallops   Gastrointestinal: Soft; NTND; bowel sounds normal and present  Extremities: WWP; no edema/cyanosis  Neurological: A&Ox3, CNII-XII grossly intact; no obvious focal deficits    MEDICATIONS  (STANDING):  meropenem  IVPB 1000 milliGRAM(s) IV Intermittent every 8 hours  metoclopramide Injectable 10 milliGRAM(s) IV Push every 6 hours  pantoprazole  Injectable 40 milliGRAM(s) IV Push every 12 hours  vancomycin  IVPB 750 milliGRAM(s) IV Intermittent every 12 hours    MEDICATIONS  (PRN):      No Known Allergies      LABS                        8.3    17.38 )-----------( 208      ( 30 Apr 2023 08:51 )             27.6     04-30    134<L>  |  102  |  12  ----------------------------<  105<H>  4.0   |  22  |  0.48<L>    Ca    8.5      30 Apr 2023 08:51  Phos  2.0     04-30  Mg     2.2     04-30    TPro  5.6<L>  /  Alb  2.4<L>  /  TBili  12.6<H>  /  DBili  x   /  AST  124<H>  /  ALT  91<H>  /  AlkPhos  504<H>  04-30                IMAGING/EKG/ETC   O/N Events: naeo    Subjective/ROS: Patient seen and examined at bedside, daughter Elly with her. Asking about removing 02 for comfort but patient 85% on room air. Patient awaiting advanced endoscopy procedure tomorrow.  Denies fevers, chills, HA, CP, SOB, n/v, changes in bowel/urinary habits. 12pt ROS otherwise negative.    VITALS  Vital Signs Last 24 Hrs  T(C): 36.4 (01 May 2023 05:43), Max: 37 (30 Apr 2023 20:55)  T(F): 97.6 (01 May 2023 05:43), Max: 98.6 (30 Apr 2023 20:55)  HR: 83 (01 May 2023 05:43) (80 - 92)  BP: 131/81 (01 May 2023 05:43) (115/69 - 131/84)  BP(mean): --  RR: 18 (01 May 2023 05:43) (16 - 20)  SpO2: 98% (01 May 2023 05:43) (98% - 100%)    Parameters below as of 01 May 2023 05:43    O2 Flow (L/min): 2      CAPILLARY BLOOD GLUCOSE      PHYSICAL EXAM:  Constitutional: elderly female, jaundiced, NAD, comfortable in bed.  HEENT: NC/AT, EOMI, scleral icterus+, dry MM  Neck: Supple, no JVD  Respiratory: CTA B/L. No w/r/r.   Cardiovascular: RRR, normal S1 and S2, no m/r/g.   Gastrointestinal: +BS, soft NTND, no guarding or rebound tenderness, no palpable masses   Extremities: wwp; no cyanosis, clubbing or edema.   Vascular: Pulses equal and strong throughout.   Neurological: AAOx1, no CN deficits  Skin: jaundiced      MEDICATIONS  (STANDING):  meropenem  IVPB 1000 milliGRAM(s) IV Intermittent every 8 hours  metoclopramide Injectable 10 milliGRAM(s) IV Push every 6 hours  pantoprazole  Injectable 40 milliGRAM(s) IV Push every 12 hours  vancomycin  IVPB 750 milliGRAM(s) IV Intermittent every 12 hours    MEDICATIONS  (PRN):      No Known Allergies      LABS                        8.3    17.38 )-----------( 208      ( 30 Apr 2023 08:51 )             27.6     04-30    134<L>  |  102  |  12  ----------------------------<  105<H>  4.0   |  22  |  0.48<L>    Ca    8.5      30 Apr 2023 08:51  Phos  2.0     04-30  Mg     2.2     04-30    TPro  5.6<L>  /  Alb  2.4<L>  /  TBili  12.6<H>  /  DBili  x   /  AST  124<H>  /  ALT  91<H>  /  AlkPhos  504<H>  04-30                IMAGING/EKG/ETC

## 2023-05-01 NOTE — PROGRESS NOTE ADULT - ATTENDING COMMENTS
Reviewed the case with Dr. Arellano earlier today. Pt seen at bedside.  Details of case discussed with renal team including labs and notes.    Agree with details of fellow's note above.  Renal service will continue to trend labs.  Please reach out to renal fellow with any concerns.

## 2023-05-01 NOTE — PROGRESS NOTE ADULT - ASSESSMENT
This is a 83F with dementia and HTN presents with worsening jaundice and anemia found to have obstructive jaundice from large uncinate mass. S/p endoscopic evaluation with biopsy. Pending endoscopic choledochoduodenostomy and GJ.     - no acute surgical intervention  - f/u GI recs  - Surgery Team 1C will continue to follow. Please page Team 1 with questions/clinical changes. [phone]

## 2023-05-01 NOTE — PROGRESS NOTE ADULT - ATTENDING COMMENTS
83 y/oF  Cantonese speaking PMH Dementia,  HTN, LEW ( baseline Hgb 9.7), presented with 1 week of jaundice, fatigue and decreased po intake. Pt was seen by PMD Dr. Kary Ly on 4/24/23, blood test showed Tbili 10.8, , , , WBC 22.8K, Hgb 6, and Serum Sodium 125.Pt had normal LFT one month ago ( TBili<0.2, ALP 56, AST 18 and ALT 11).  Pt was sent to [facility]/Dr. Yonas Evans whom referred pt to the hospital for further management. CTAP reviewed w. 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    # Painless jaundice   # Necrotic mass at the uncinate process of pancreas likely advanced Pancreatic carcinoma   # Malignant CBD obstruction   # Pulm nodules likely Pulm mets   # Severe Hyponatremia/SIADH   # Failure to thrive   # Dementia   # Leukocytosis /possible cholangitis 2/2 biliary obstruction   # Severe Anemia   # N/V   # Abd pain   # Duodenal ulcer/ Gwendolyn Ruano tear  # Coagulopathy   - Pt initially admitted to [facility] stepdown for severe hyponatremia SNa+ 120->127( 4/26)  after 1L IVF, and s/p 2u RPBC ( 4/25) for anemia with Hgb 5.7-> 8.8 ( 4/26) , hemodynamically stable, transferred to [facility] ( 4/26)   -  INR 1.95( 4/25)> 2 ( 4/26)-> 1.2( 4/27) likely 2/2 Vit K deficiency, given Vitamin K 10mg iv ( 4/26), repeat INR 1.2, met the goal <1.5 for ERCP ( 4/27)   - GI f/u appreciated, s/p EUS guided Bx and attempted ERCP ( 4/27) : found Gwendolyn Ruano tear and DU, fungating and ulcerated mass ~ 6cm, not amenable for biliary stent. s/p biopsied. f/u path d/w Pathologist, , high grade carcinoma, awaiting IHC and likely can signoff the case today   -  plan on endoscopic choledochoduodenostomy ( stent) tomorrow on Tuesday 5/2 as d/w GI Dr. Darren Enriquez and Dr. Ziyad Jones   - c/w PPI iv bid   - c/w Reglan 10mg iv q6hr as pro-motility agent   - check tumor markers: CA 19-9=306 and CEA level =6.3  - Renal f/u appreciated, 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, f/u TSH and am cortisol ok , trend BMP SNa+ 131 ( 4/29)-> 134 (4/30)   - Pt on full liquid diet as tolerated ( no need for fluid restriction as pt not taking in liquid more than ~ 1L )  -trend BMP SCr 127(4/26)-. 125( 4/27)-> 128( 4/28) -> 131( 4/29)-> 134( 4/30)  - transfused 2U PRBC, keep Hgb>7, Hgb stable 9.1  - Leukocytosis: c/w Zosyn empirically ( 4/25-4/28), changed to Meropenem 1gm iv q8hr  Day 5 for cholangitis, trend WBC ,Leukocytosis could also be 2/2 leukemoid reaction from large necrotic tumor, WBC 18.68K( 4/27) -> 21.23K( 4/28)- >14K( 4/29)-> 17.38( 4/30)   - f/u BCx : no growth for 12hrs  - ID approval appreciated from , will get ID consult given pt on such broad spectrum iv abx on Meropenem  - trend LFT for transaminitis/obstructive pattern, TBIli 11.8-> 12.6, - >504, -> 124, ALT 96-> 91  -  viral hepatitis serology negative   - Heme Onc f/u appreciated, not a candidate for chemotherapy given frailed condition  - Palliative care consult appreciated for GOC discussion ( initiated),Pt is DNR/DNI, pt not a candidate for home hospice due to lack of family support  - Rehab consult appreciatec rec: [facility] Vs home with home PT with increased HHA for assistance   - code status: DNR/DNI ( pt has living will) and HCP previously completed , will complete MOLST form and make copy of HCP/living will for record  1st HCP: Alessandro Mares ( )  Alternate HCP: Elly Mares ( daughter)   - VTE ppx; SCDs    Contact:  Elly Mares ( daughter) [phone]   PMD: Dr. Kary Ozuna [phone]  GI: Dr. Yonas Evans/[facility] [phone]    Disposition: medically active, d/c likely later this week awaiting biliary decompression/Choledochoduodenostomy tomorrow on Tues 5/2 , hopefully able to perform the procedure successfully and no complication.   SW consult, pt needs wheelchair, rolling walker, and increasing HHA ( pt will benefit 24/7 HHA and at least 12hr HHA given frailed condition from this newly dx advanced metastatic pancreatic Ca w/ lung mets and biliary obs and FTT). As d/w daughter Elly whom has also d/w pt's  , family prefers going home rather than [facility] or inpatient Hospice     POC as d/w daughter Elly and 7WO team Dr.Alexandra Brown & Dr.Ayelet Rhodes

## 2023-05-01 NOTE — PROGRESS NOTE ADULT - SUBJECTIVE AND OBJECTIVE BOX
SUBJECTIVE: Patient seen and evaluated. No specific complaints. Daughter at bedside        MEDICATIONS  (STANDING):  meropenem  IVPB 1000 milliGRAM(s) IV Intermittent every 8 hours  pantoprazole  Injectable 40 milliGRAM(s) IV Push every 12 hours  vancomycin  IVPB 750 milliGRAM(s) IV Intermittent every 12 hours    MEDICATIONS  (PRN):      Vital Signs Last 24 Hrs  T(C): 36.4 (01 May 2023 05:43), Max: 37 (30 Apr 2023 20:55)  T(F): 97.6 (01 May 2023 05:43), Max: 98.6 (30 Apr 2023 20:55)  HR: 83 (01 May 2023 05:43) (80 - 92)  BP: 131/81 (01 May 2023 05:43) (115/69 - 131/84)  BP(mean): --  RR: 18 (01 May 2023 05:43) (16 - 20)  SpO2: 98% (01 May 2023 05:43) (98% - 100%)    Parameters below as of 01 May 2023 05:43    O2 Flow (L/min): 2      Physical Exam:  General: NAD, resting comfortably in bed, jaundice  Pulmonary: Nonlabored breathing, no respiratory distress  Cardiovascular: NSR  Abdominal: soft, mildly distended, no tenderness no rebound or guarding    I&O's Summary      LABS:                        7.7    18.26 )-----------( 248      ( 01 May 2023 05:30 )             23.9     05-01    136  |  104  |  13  ----------------------------<  151<H>  4.2   |  22  |  0.50    Ca    8.4      01 May 2023 05:30  Phos  2.3     05-01  Mg     2.1     05-01    TPro  5.3<L>  /  Alb  2.2<L>  /  TBili  11.6<H>  /  DBili  x   /  AST  142<H>  /  ALT  90<H>  /  AlkPhos  501<H>  05-01        CAPILLARY BLOOD GLUCOSE      POCT Blood Glucose.: 158 mg/dL (01 May 2023 08:32)    LIVER FUNCTIONS - ( 01 May 2023 05:30 )  Alb: 2.2 g/dL / Pro: 5.3 g/dL / ALK PHOS: 501 U/L / ALT: 90 U/L / AST: 142 U/L / GGT: x             RADIOLOGY & ADDITIONAL STUDIES:

## 2023-05-01 NOTE — PROGRESS NOTE ADULT - PROBLEM SELECTOR PLAN 1
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 GI and Surgery consultation placed with plans for ERCP with CBD stenting/biopsy for diagnosis.   CTAP 4/25: cystic/necrotic lesion in uncinate process of pancreas 6x10cm, CBD dilation 2.4cm, pulmonary nodules suspicious for metastasis.   Patient now s/p EUS-ERCP on 4/27 for biliary stent, unable to place due to mass morphology now pending bx reports for possible choledocoduodenostomy early next week.     Plan:   - advanced endoscopy procedure tomorrow   - further C conversations for outpatient planning upon discharge   - monitor for post ERCP complications (CBC, pain control/monitoring)   - Patient is DNR/DNI (Living will scanned into chart)     # Obstructive Jaundice  - patient with new fever, now on meropenem and vanocymcin - can likely dc tomorrow, no more fevers.

## 2023-05-01 NOTE — PROGRESS NOTE ADULT - ASSESSMENT
83 female PMH HTN dementia presented to the ED with complaints of painless jaundice x1 week  Nephrology consulted for hyponatremia management. sNa now at 136    Assessment/Plan:   #acute on chronic hyponatremia, euvolemic (resolved)   asymptomatic   possibly SIADH given high Sofía/uOsm, clinical picture   remains hyponatremic      Recommend   would continue with 1L fluid restriction   encourage PO intake as tolerable

## 2023-05-01 NOTE — PROGRESS NOTE ADULT - PROBLEM SELECTOR PLAN 3
Assume chronic hyponatremia, though mild pretibial edema, does not appear fluid overloaded. 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 AM cortisol and TSH wnl.     Plan:   - Keep fluid restriction to <1L.   - changed medication carrier to NS

## 2023-05-01 NOTE — PROGRESS NOTE ADULT - SUBJECTIVE AND OBJECTIVE BOX
GASTROENTEROLOGY PROGRESS NOTE  Patient seen and examined at bedside.    PERTINENT REVIEW OF SYSTEMS:  As noted above    Allergies    No Known Allergies    Intolerances      MEDICATIONS:  MEDICATIONS  (STANDING):  meropenem  IVPB 1000 milliGRAM(s) IV Intermittent every 8 hours  pantoprazole  Injectable 40 milliGRAM(s) IV Push every 12 hours  vancomycin  IVPB 750 milliGRAM(s) IV Intermittent every 12 hours    MEDICATIONS  (PRN):    Vital Signs Last 24 Hrs  T(C): 36.4 (01 May 2023 05:43), Max: 37 (30 Apr 2023 20:55)  T(F): 97.6 (01 May 2023 05:43), Max: 98.6 (30 Apr 2023 20:55)  HR: 83 (01 May 2023 05:43) (83 - 92)  BP: 131/81 (01 May 2023 05:43) (131/81 - 131/84)  BP(mean): --  RR: 18 (01 May 2023 05:43) (18 - 20)  SpO2: 98% (01 May 2023 05:43) (98% - 99%)    Parameters below as of 01 May 2023 05:43    O2 Flow (L/min): 2      PHYSICAL EXAM:    General: malnourished; dowsy  HEENT: MMM, conjunctiva and scleral icterus  Gastrointestinal: Soft non-tender non-distended; No rebound or guarding  Skin: Warm and dry. No obvious rash    LABS:                        7.7    18.26 )-----------( 248      ( 01 May 2023 05:30 )             23.9     05-01    136  |  104  |  13  ----------------------------<  151<H>  4.2   |  22  |  0.50    Ca    8.4      01 May 2023 05:30  Phos  2.3     05-01  Mg     2.1     05-01    TPro  5.3<L>  /  Alb  2.2<L>  /  TBili  11.6<H>  /  DBili  x   /  AST  142<H>  /  ALT  90<H>  /  AlkPhos  501<H>  05-01                      RADIOLOGY & ADDITIONAL STUDIES:  Reviewed

## 2023-05-01 NOTE — PROGRESS NOTE ADULT - SUBJECTIVE AND OBJECTIVE BOX
Patient is a 83y Female seen and evaluated at bedside. No acute distress, VSS sNA at 136 today       Meds:    meropenem  IVPB 1000 every 8 hours  pantoprazole  Injectable 40 every 12 hours  vancomycin  IVPB 750 every 12 hours      T(C): , Max: 37 (04-30-23 @ 20:55)  T(F): , Max: 98.6 (04-30-23 @ 20:55)  HR: 83 (05-01-23 @ 05:43)  BP: 131/81 (05-01-23 @ 05:43)  BP(mean): --  RR: 18 (05-01-23 @ 05:43)  SpO2: 98% (05-01-23 @ 05:43)  Wt(kg): --        Review of Systems:  ROS negative except as per HPI      PHYSICAL EXAM:  General,  NAD.  HEENT: moist mucous membranes, no pallor/cyanosis.  Neck: no JVD visible.  Cardiac: S1, S2. RRR. No murmurs   Respratory: CTA b/l, no access muscle use.   Abdomen: soft. nontender. nondistended  Skin: no rashes.  Extremities: trace  LE edema b/l    LABS:                        7.7    18.26 )-----------( 248      ( 01 May 2023 05:30 )             23.9     05-01    136  |  104  |  13  ----------------------------<  151<H>  4.2   |  22  |  0.50    Ca    8.4      01 May 2023 05:30  Phos  2.3     05-01  Mg     2.1     05-01    TPro  5.3<L>  /  Alb  2.2<L>  /  TBili  11.6<H>  /  DBili  x   /  AST  142<H>  /  ALT  90<H>  /  AlkPhos  501<H>  05-01          Sodium, Random Urine: 149 mmol/L (04-30 @ 10:24)        RADIOLOGY & ADDITIONAL STUDIES:

## 2023-05-02 NOTE — CONSULT NOTE ADULT - SUBJECTIVE AND OBJECTIVE BOX
INFECTIOUS DISEASES INITIAL CONSULT NOTE      HPI: 82 y/o Cantonese speaking, history obtained from daughter, F PMH HTN, dementia presents with 1 week of jaundice. She presented to her out patient GI, Dr. Aguero, and he referred her to our ED due to jaundice. She has had yellowing of the skin of 1 week and worsening fatigue over the same time. She usually ambulates with assistance but for the past 1 day she has not been too tired to get up. Other then fatigue the daughter notes no change in mental status. She has had reduced PO intake and unspecified amount of weight loss which the daughter contributes to not eating as much. CT showing pancreatic mass now s/p EUS guided Bx and attempted ERCP ( 4/27) : found Gwendolyn Ruano tear and DU, fungating and ulcerated mass ~ 6cm, not amenable for biliary stent, pending choleocuduodesntosoym with GI. Initially started on zosyn 4/25-4/28 changed to meropenem 1g q8hr on day 5 for presumed cholangitis. Leukocytosis on admission 22 initially improved to 14 by 4/29 however now rising back to 21. No further fevers.             PAST MEDICAL & SURGICAL HISTORY:        Review of Systems:   Constitutional, eyes, ENT, cardiovascular, respiratory, gastrointestinal, genitourinary, integumentary, neurological, psychiatric and heme/lymph are otherwise negative other than noted above       ANTIBIOTICS:  MEDICATIONS  (STANDING):  meropenem  IVPB 1000 milliGRAM(s) IV Intermittent every 8 hours  pantoprazole  Injectable 40 milliGRAM(s) IV Push every 12 hours    MEDICATIONS  (PRN):      Allergies    No Known Allergies    Intolerances        SOCIAL HISTORY:    FAMILY HISTORY:   no FH leading to current infection    Vital Signs Last 24 Hrs  T(C): 37.1 (02 May 2023 12:13), Max: 37.1 (02 May 2023 12:13)  T(F): 98.8 (02 May 2023 12:13), Max: 98.8 (02 May 2023 12:13)  HR: 99 (02 May 2023 12:13) (88 - 99)  BP: 151/92 (02 May 2023 12:13) (136/79 - 151/92)  BP(mean): --  RR: 18 (02 May 2023 12:13) (18 - 18)  SpO2: 93% (02 May 2023 12:13) (93% - 98%)    Parameters below as of 02 May 2023 12:13  Patient On (Oxygen Delivery Method): nasal cannula  O2 Flow (L/min): 2      05-01-23 @ 07:01  -  05-02-23 @ 07:00  --------------------------------------------------------  IN: 370 mL / OUT: 400 mL / NET: -30 mL        PHYSICAL EXAM:  Constitutional: alert, NAD  Eyes: the sclera and conjunctiva were normal.   ENT: the ears and nose were normal in appearance.   Neck: the appearance of the neck was normal and the neck was supple.   Pulmonary: no respiratory distress and lungs were clear to auscultation bilaterally.   Heart: heart rate was normal and rhythm regular, normal S1 and S2  Vascular:. there was no peripheral edema  Abdomen: normal bowel sounds, soft, non-tender  Neurological: no focal deficits.   Psychiatric: the affect was normal      LABS:                        10.3   21.60 )-----------( 269      ( 02 May 2023 05:30 )             31.7     05-02    134<L>  |  101  |  15  ----------------------------<  145<H>  4.2   |  25  |  0.55    Ca    8.9      02 May 2023 05:30  Phos  2.2     05-02  Mg     2.1     05-02    TPro  5.6<L>  /  Alb  2.5<L>  /  TBili  12.4<H>  /  DBili  x   /  AST  162<H>  /  ALT  101<H>  /  AlkPhos  558<H>  05-02    PT/INR - ( 02 May 2023 05:30 )   PT: 13.9 sec;   INR: 1.17          PTT - ( 02 May 2023 05:30 )  PTT:26.4 sec      MICROBIOLOGY:    RADIOLOGY & ADDITIONAL STUDIES:   INFECTIOUS DISEASES INITIAL CONSULT NOTE      HPI: 84 y/o Cantonese speaking, history obtained from daughter, F PMH HTN, dementia presents with 1 week of jaundice. She presented to her out patient GI, Dr. Aguero, and he referred her to our ED due to jaundice. She has had yellowing of the skin of 1 week and worsening fatigue over the same time. She usually ambulates with assistance but for the past 1 day she has not been too tired to get up. Other then fatigue the daughter notes no change in mental status. She has had reduced PO intake and unspecified amount of weight loss which the daughter contributes to not eating as much. CT showing pancreatic mass now s/p EUS guided Bx and attempted ERCP ( 4/27) : found Gwendolyn Ruano tear and DU, fungating and ulcerated mass ~ 6cm, not amenable for biliary stent, pending choleocuduodesntosoym with GI. Initially started on zosyn 4/25-4/28 changed to meropenem 1g q8hr on day 5 for presumed cholangitis. Leukocytosis on admission 22 initially improved to 14 by 4/29 however now rising back to 21. No further fevers.       Interval events: Patient seen and examined at bedside with daughter. Per daughter, pt lives w/  at home, difficulty walking and eating less prior to admission. No hx of infections in the past. No sick contacts. History from patient limited as daughter reports baseline mental status tends to be more AOx1-2. PT denies abdominal pain, burning with urination, fevers, sweating. Daughter reports she had intermittent coughing overnight.           PAST MEDICAL & SURGICAL HISTORY:        Review of Systems:   Constitutional, eyes, ENT, cardiovascular, respiratory, gastrointestinal, genitourinary, integumentary, neurological, psychiatric and heme/lymph are otherwise negative other than noted above       ANTIBIOTICS:  MEDICATIONS  (STANDING):  meropenem  IVPB 1000 milliGRAM(s) IV Intermittent every 8 hours  pantoprazole  Injectable 40 milliGRAM(s) IV Push every 12 hours    MEDICATIONS  (PRN):      Allergies    No Known Allergies    Intolerances        SOCIAL HISTORY:    FAMILY HISTORY:   no FH leading to current infection    Vital Signs Last 24 Hrs  T(C): 37.1 (02 May 2023 12:13), Max: 37.1 (02 May 2023 12:13)  T(F): 98.8 (02 May 2023 12:13), Max: 98.8 (02 May 2023 12:13)  HR: 99 (02 May 2023 12:13) (88 - 99)  BP: 151/92 (02 May 2023 12:13) (136/79 - 151/92)  BP(mean): --  RR: 18 (02 May 2023 12:13) (18 - 18)  SpO2: 93% (02 May 2023 12:13) (93% - 98%)    Parameters below as of 02 May 2023 12:13  Patient On (Oxygen Delivery Method): nasal cannula  O2 Flow (L/min): 2      05-01-23 @ 07:01  -  05-02-23 @ 07:00  --------------------------------------------------------  IN: 370 mL / OUT: 400 mL / NET: -30 mL        PHYSICAL EXAM:  Constitutional: alert, NAD  Eyes: the sclera and conjunctiva were normal.   ENT: the ears and nose were normal in appearance.   Neck: the appearance of the neck was normal and the neck was supple.   Pulmonary: no respiratory distress and lungs were clear to auscultation bilaterally.   Heart: heart rate was normal and rhythm regular, normal S1 and S2  Vascular:. there was no peripheral edema  Abdomen: normal bowel sounds, soft, non-tender  Neurological: no focal deficits.   Psychiatric: the affect was normal      LABS:                        10.3   21.60 )-----------( 269      ( 02 May 2023 05:30 )             31.7     05-02    134<L>  |  101  |  15  ----------------------------<  145<H>  4.2   |  25  |  0.55    Ca    8.9      02 May 2023 05:30  Phos  2.2     05-02  Mg     2.1     05-02    TPro  5.6<L>  /  Alb  2.5<L>  /  TBili  12.4<H>  /  DBili  x   /  AST  162<H>  /  ALT  101<H>  /  AlkPhos  558<H>  05-02    PT/INR - ( 02 May 2023 05:30 )   PT: 13.9 sec;   INR: 1.17          PTT - ( 02 May 2023 05:30 )  PTT:26.4 sec      MICROBIOLOGY:    RADIOLOGY & ADDITIONAL STUDIES:   INFECTIOUS DISEASES INITIAL CONSULT NOTE      HPI: 84 y/o Cantonese speaking, history obtained from daughter, F PMH HTN, dementia presents with 1 week of jaundice. She presented to her out patient GI, Dr. Aguero, and he referred her to our ED due to jaundice. She has had yellowing of the skin of 1 week and worsening fatigue over the same time. She usually ambulates with assistance but for the past 1 day she has not been too tired to get up. Other then fatigue the daughter notes no change in mental status. She has had reduced PO intake and unspecified amount of weight loss which the daughter contributes to not eating as much. CT showing pancreatic mass now s/p EUS guided Bx and attempted ERCP ( 4/27) : found Gwendolyn Ruano tear and DU, fungating and ulcerated mass ~ 6cm, not amenable for biliary stent, pending choleocuduodesntosoym with GI. Initially started on zosyn 4/25-4/28 changed to meropenem 1g q8hr on day 5 for presumed cholangitis. Leukocytosis on admission 22 initially improved to 14 by 4/29 however now rising back to 21. No further fevers.       Interval events: Patient seen and examined at bedside with daughter. Per daughter, pt lives w/  at home, difficulty walking and eating less prior to admission. No hx of infections in the past. No sick contacts. History from patient limited as daughter reports baseline mental status tends to be more AOx1-2. PT denies abdominal pain, burning with urination, fevers, sweating. Daughter reports she had intermittent coughing overnight.           PAST MEDICAL & SURGICAL HISTORY:        Review of Systems:   Constitutional, eyes, ENT, cardiovascular, respiratory, gastrointestinal, genitourinary, integumentary, neurological, psychiatric and heme/lymph are otherwise negative other than noted above       ANTIBIOTICS:  MEDICATIONS  (STANDING):  meropenem  IVPB 1000 milliGRAM(s) IV Intermittent every 8 hours  pantoprazole  Injectable 40 milliGRAM(s) IV Push every 12 hours    MEDICATIONS  (PRN):      Allergies    No Known Allergies    Intolerances        SOCIAL HISTORY:  From [facility] [place] in 2006  Lives at home w/ , no sick contacts    FAMILY HISTORY:   no FH leading to current infection    Vital Signs Last 24 Hrs  T(C): 37.1 (02 May 2023 12:13), Max: 37.1 (02 May 2023 12:13)  T(F): 98.8 (02 May 2023 12:13), Max: 98.8 (02 May 2023 12:13)  HR: 99 (02 May 2023 12:13) (88 - 99)  BP: 151/92 (02 May 2023 12:13) (136/79 - 151/92)  BP(mean): --  RR: 18 (02 May 2023 12:13) (18 - 18)  SpO2: 93% (02 May 2023 12:13) (93% - 98%)    Parameters below as of 02 May 2023 12:13  Patient On (Oxygen Delivery Method): nasal cannula  O2 Flow (L/min): 2      05-01-23 @ 07:01  -  05-02-23 @ 07:00  --------------------------------------------------------  IN: 370 mL / OUT: 400 mL / NET: -30 mL        PHYSICAL EXAM:  Constitutional: alert, NAD  Eyes: the sclera and conjunctiva were normal.   ENT: the ears and nose were normal in appearance.   Neck: the appearance of the neck was normal and the neck was supple.   Pulmonary: no respiratory distress and lungs were clear to auscultation bilaterally.   Heart: heart rate was normal and rhythm regular, normal S1 and S2  Vascular:. there was no peripheral edema  Abdomen: normal bowel sounds, soft, non-tender  Neurological: no focal deficits.   Psychiatric: the affect was normal      LABS:                        10.3   21.60 )-----------( 269      ( 02 May 2023 05:30 )             31.7     05-02    134<L>  |  101  |  15  ----------------------------<  145<H>  4.2   |  25  |  0.55    Ca    8.9      02 May 2023 05:30  Phos  2.2     05-02  Mg     2.1     05-02    TPro  5.6<L>  /  Alb  2.5<L>  /  TBili  12.4<H>  /  DBili  x   /  AST  162<H>  /  ALT  101<H>  /  AlkPhos  558<H>  05-02    PT/INR - ( 02 May 2023 05:30 )   PT: 13.9 sec;   INR: 1.17          PTT - ( 02 May 2023 05:30 )  PTT:26.4 sec      MICROBIOLOGY:    RADIOLOGY & ADDITIONAL STUDIES:   INFECTIOUS DISEASES INITIAL CONSULT NOTE      HPI: 84 y/o Cantonese speaking, history obtained from daughter, F PMH HTN, dementia presents with 1 week of jaundice. She presented to her out patient GI, Dr. Aguero, and he referred her to our ED due to jaundice. She has had yellowing of the skin of 1 week and worsening fatigue over the same time. She usually ambulates with assistance but for the past 1 day she has not been too tired to get up. Other then fatigue the daughter notes no change in mental status. She has had reduced PO intake and unspecified amount of weight loss which the daughter contributes to not eating as much. CT showing pancreatic mass now s/p EUS guided Bx and attempted ERCP ( 4/27) : found Gwendolyn Ruano tear and DU, fungating and ulcerated mass ~ 6cm, not amenable for biliary stent, pending choleocuduodesntosoym with GI. Initially started on zosyn 4/25-4/28 changed to meropenem 1g q8hr on day 5 for presumed cholangitis. Leukocytosis on admission 22 initially improved to 14 by 4/29 however now rising back to 21. No further fevers.       Interval events: Patient seen and examined at bedside with daughter. Per daughter, pt lives w/  at home, difficulty walking and eating less prior to admission. No hx of infections in the past. No sick contacts. History from patient limited as daughter reports baseline mental status tends to be more AOx1-2. PT denies abdominal pain, burning with urination, fevers, sweating. Daughter reports she had intermittent coughing overnight.           PAST MEDICAL & SURGICAL HISTORY:        Review of Systems:   Constitutional, eyes, ENT, cardiovascular, respiratory, gastrointestinal, genitourinary, integumentary, neurological, psychiatric and heme/lymph are otherwise negative other than noted above       ANTIBIOTICS:  MEDICATIONS  (STANDING):  meropenem  IVPB 1000 milliGRAM(s) IV Intermittent every 8 hours  pantoprazole  Injectable 40 milliGRAM(s) IV Push every 12 hours    MEDICATIONS  (PRN):      Allergies    No Known Allergies    Intolerances        SOCIAL HISTORY:  From [facility] came to [place] in 2006  Lives at home w/ , no sick contacts  no toxic habits     FAMILY HISTORY:   no FH leading to current infection    Vital Signs Last 24 Hrs  T(C): 37.1 (02 May 2023 12:13), Max: 37.1 (02 May 2023 12:13)  T(F): 98.8 (02 May 2023 12:13), Max: 98.8 (02 May 2023 12:13)  HR: 99 (02 May 2023 12:13) (88 - 99)  BP: 151/92 (02 May 2023 12:13) (136/79 - 151/92)  BP(mean): --  RR: 18 (02 May 2023 12:13) (18 - 18)  SpO2: 93% (02 May 2023 12:13) (93% - 98%)    Parameters below as of 02 May 2023 12:13  Patient On (Oxygen Delivery Method): nasal cannula  O2 Flow (L/min): 2      05-01-23 @ 07:01  -  05-02-23 @ 07:00  --------------------------------------------------------  IN: 370 mL / OUT: 400 mL / NET: -30 mL        PHYSICAL EXAM:  Constitutional: alert, NAD  Eyes: the sclera and conjunctiva were normal.   ENT: the ears and nose were normal in appearance.   Neck: the appearance of the neck was normal and the neck was supple.   Pulmonary: no respiratory distress and lungs were clear to auscultation bilaterally.   Heart: heart rate was normal and rhythm regular, normal S1 and S2  Vascular:. there was no peripheral edema  Abdomen: normal bowel sounds, soft, non-tender  Neurological: no focal deficits.   Psychiatric: the affect was normal      LABS:                        10.3   21.60 )-----------( 269      ( 02 May 2023 05:30 )             31.7     05-02    134<L>  |  101  |  15  ----------------------------<  145<H>  4.2   |  25  |  0.55    Ca    8.9      02 May 2023 05:30  Phos  2.2     05-02  Mg     2.1     05-02    TPro  5.6<L>  /  Alb  2.5<L>  /  TBili  12.4<H>  /  DBili  x   /  AST  162<H>  /  ALT  101<H>  /  AlkPhos  558<H>  05-02    PT/INR - ( 02 May 2023 05:30 )   PT: 13.9 sec;   INR: 1.17          PTT - ( 02 May 2023 05:30 )  PTT:26.4 sec      MICROBIOLOGY:    RADIOLOGY & ADDITIONAL STUDIES:

## 2023-05-02 NOTE — PROGRESS NOTE ADULT - PROBLEM SELECTOR PLAN 2
Normocytic anemia w hgb 5.7 (down from 9.7 1 month ago on ferrous sulfate PO 325mg daily). S/p 2 units PRBC transfusion with improvement in hemoglobin. LDH and hapto wnl. Borderline iron deficiency noted on lab work. Most recently, received 1 unit pRBC's overnight 05/01 in preparation for advanced endoscopy.     - Maintain active T&S.   - Transfuse <7

## 2023-05-02 NOTE — PROGRESS NOTE ADULT - ASSESSMENT
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

## 2023-05-02 NOTE — CONSULT NOTE ADULT - ASSESSMENT
84 y/o Cantonese speaking, history obtained from daughter, F PMH HTN, dementia presents with 1 week of jaundice found to have pancreatic mass s/p EUS and biopsy pending choleocuduodesntosoym. Course c/b leukocytosis to 22k and Tmax 100.9 on 4/28, started on zosyn then switched to meropenem on 4/29. ID consulted for leukocytosis and IV abx duration       Afebrile for 48hr+  Ucx 4/25 contaminated  Blood cx 4/28 NGTD x2  CXR with progressive lung infiltrates bilaterally      Recommendations  84 y/o Cantonese speaking, history obtained from daughter, F PMH HTN, dementia presents with 1 week of jaundice found to have pancreatic mass s/p EUS and biopsy pending choleocuduodesntosoym. Course c/b leukocytosis to 22k and Tmax 100.9 on 4/28, started on zosyn then switched to meropenem on 4/29. ID consulted for leukocytosis and IV abx duration       Afebrile for 48hr+  Ucx 4/25 contaminated  Blood cx 4/28 NGTD x2  CXR with progressive lung infiltrates bilaterally      Recommendations:             Team 1 will  84 y/o Cantonese speaking, history obtained from daughter, F PMH HTN, dementia presents with 1 week of jaundice found to have pancreatic mass s/p EUS and biopsy pending choleocuduodesntosoym. Course c/b leukocytosis to 22k and Tmax 100.9 on 4/28, started on zosyn then switched to meropenem on 4/29. ID consulted for leukocytosis and IV abx duration       Afebrile for 48hr+  Ucx 4/25 contaminated  Blood cx 4/28 NGTD x2  CXR with progressive lung infiltrates bilaterally      Recommendations:   - persistent leukocytosis despite 7 days of antibiotics  - afebrile for 48hr+ hours, suspect isolated fever on 4/28 was post-procedure related  - monitor off antibiotics       Team 1 will continue to follow with you. Thank you for the consult.

## 2023-05-02 NOTE — PROGRESS NOTE ADULT - ASSESSMENT
83 y/oF  Cantonese speaking PMH Dementia,  HTN, LEW ( baseline Hgb 9.7), presented with 1 week of jaundice, fatigue and decreased po intake. Pt was seen by PMD Dr. Kary Ly on 4/24/23, blood test showed Tbili 10.8, , , , WBC 22.8K, Hgb 6, and Serum Sodium 125.Pt had normal LFT one month ago ( TBili<0.2, ALP 56, AST 18 and ALT 11).  Pt was sent to [facility]/Dr. Yonas Evans whom referred pt to the hospital for further management. CTAP reviewed w. 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  # Malignant CBD obstruction   # Pulm nodules likely Pulm mets   # Severe Hyponatremia/SIADH   # Failure to thrive   # Dementia   # Leukocytosis /possible cholangitis 2/2 biliary obstruction   # Severe Anemia   # N/V   # Abd pain   # Duodenal ulcer/ Gwendolyn Ruano tear  # Coagulopathy     - Pt initially admitted to [facility] stepdown for severe hyponatremia SNa+ 120->127( 4/26)  after 1L IVF, and s/p 2u RPBC ( 4/25) for anemia with Hgb 5.7-> 8.8 ( 4/26) , hemodynamically stable, transferred to [facility] ( 4/26)   -  INR 1.95( 4/25)> 2 ( 4/26)-> 1.2( 4/27) likely 2/2 Vit K deficiency, given Vitamin K 10mg iv ( 4/26), repeat INR 1.2, met the goal <1.5 for ERCP ( 4/27) , repeat INR 1.17( 5/2)   - GI f/u appreciated  - s/p EUS guided Bx and attempted ERCP ( 4/27) : found Gwendolyn Ruano tear and DU, fungating and ulcerated mass ~ 6cm, not amenable for biliary stent. s/p biopsied.  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 Darren Enriquez and Dr. Ziyad Jones ( Hgb 10.3 and INR 1.17 on 5/2)   - c/w PPI iv bid   - c/w Reglan 10mg iv q6hr as pro-motility agent   - Tumor markers: CA 19-9=306 and CEA level =6.3  - Hyponatremia ( resolved): Renal f/u appreciated, 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, f/u TSH and am cortisol ok , trend BMP SNa+ 131 ( 4/29)-> 134 (4/30) -> 136( 5/2)   - Pt on full liquid diet as tolerated ( no need for fluid restriction as pt not taking in liquid more than ~ 1L )  -trend BMP SCr 127(4/26)-. 125( 4/27)-> 128( 4/28) -> 131( 4/29)-> 134( 4/30)- >136( 5/2)   - transfused 2U PRBC, keep Hgb>7, Hgb stable 9.1---> 7.7( 5/1) s/p 1U PRBC(5/1)-> 10.3( 5/2) total 3U PRBC since adm  - Leukocytosis: c/w Zosyn empirically ( 4/25-4/28), changed to Meropenem 1gm iv q8hr( 4/28-)  Day 5 for cholangitis, trend WBC ,Leukocytosis could also be 2/2 leukemoid reaction from large necrotic tumor, WBC 18.68K( 4/27) -> 21.23K( 4/28)- >14K( 4/29)-> 17.38( 4/30) -> 21.6K(5/2)   - f/u BCx : no growth for 12hrs  - ID approval appreciated from , will get ID consult given pt on such broad spectrum iv abx on Meropenem  - trend LFT for transaminitis/obstructive pattern, TBIli 11.8-> 12.6-> 11.6, - >504-> 501, -> 124-> 142, ALT 96-> 91-> 90  -  viral hepatitis serology negative   - Heme Onc f/u appreciated, not a candidate for chemotherapy given frailed condition  - Palliative care consult appreciated for GOC discussion ( initiated),Pt is DNR/DNI, pt not a candidate for home hospice due to lack of family support  - Rehab consult appreciated rec: [facility] Vs home with home PT with increased HHA for assistance   - code status: DNR/DNI ( pt has living will) and HCP previously completed , will complete MOLST form and make copy of HCP/living will for record  1st HCP: Alessandro Mares ( )  Alternate HCP: Elly Mares ( daughter)   - VTE ppx; SCDs    Contact:  Elly Mares ( daughter) [phone]   PMD: Dr. Kary Ozuna [phone]  GI: Dr. Yonas Evans/[facility] [phone]    Disposition: medically active, d/c possible later this week awaiting biliary decompression/Choledochoduodenostomy today on Tues 5/2 , hopefully able to perform the procedure successfully and no complication.   SW consult, pt needs wheelchair, rolling walker, and increasing HHA ( pt will benefit 24/7 HHA and at least 12hr HHA given frailed condition from this newly dx advanced metastatic pancreatic CaVs Deuoenal Ca w/ lung mets and biliary obs and FTT). As d/w daughter Elly whom  spoken with pt's  , family prefers bringing pt home rather than [facility] or inpatient Hospice     POC as d/w daughter Elly and [facility] team Dr.Alexandra Brown.    Dr. Ladarius David covering 5/3-5/10/23

## 2023-05-02 NOTE — PROGRESS NOTE ADULT - SUBJECTIVE AND OBJECTIVE BOX
Subjective/ROS: Patient seen and examined at bedside, no acute events overnight. Patient lethargic but opens eyes to conversation. Elly at bedside. Will likely go for GI procedure this afternoon. 12pt ROS otherwise negative.    VITALS  Vital Signs Last 24 Hrs  T(C): 37.1 (02 May 2023 12:13), Max: 37.1 (02 May 2023 12:13)  T(F): 98.8 (02 May 2023 12:13), Max: 98.8 (02 May 2023 12:13)  HR: 99 (02 May 2023 12:13) (88 - 99)  BP: 151/92 (02 May 2023 12:13) (136/79 - 151/92)  BP(mean): --  RR: 18 (02 May 2023 12:13) (18 - 18)  SpO2: 93% (02 May 2023 12:13) (93% - 98%)    Parameters below as of 02 May 2023 12:13  Patient On (Oxygen Delivery Method): nasal cannula  O2 Flow (L/min): 2      CAPILLARY BLOOD GLUCOSE      POCT Blood Glucose.: 124 mg/dL (02 May 2023 12:22)  POCT Blood Glucose.: 134 mg/dL (02 May 2023 08:43)      PHYSICAL EXAM  General: NAD  Head: NC/AT; MMM; PERRL; EOMI;  Neck: Supple; no JVD  Respiratory: CTAB; no wheezes/rales/rhonchi  Cardiovascular: Regular rhythm/rate; S1/S2+, no murmurs, rubs gallops   Gastrointestinal: Soft; NTND; bowel sounds normal and present  Extremities: WWP; no edema/cyanosis  Neurological: A&Ox3, CNII-XII grossly intact; no obvious focal deficits    MEDICATIONS  (STANDING):  meropenem  IVPB 1000 milliGRAM(s) IV Intermittent every 8 hours  pantoprazole  Injectable 40 milliGRAM(s) IV Push every 12 hours    MEDICATIONS  (PRN):      No Known Allergies      LABS                        10.3   21.60 )-----------( 269      ( 02 May 2023 05:30 )             31.7     05-02    134<L>  |  101  |  15  ----------------------------<  145<H>  4.2   |  25  |  0.55    Ca    8.9      02 May 2023 05:30  Phos  2.2     05-02  Mg     2.1     05-02    TPro  5.6<L>  /  Alb  2.5<L>  /  TBili  12.4<H>  /  DBili  x   /  AST  162<H>  /  ALT  101<H>  /  AlkPhos  558<H>  05-02    PT/INR - ( 02 May 2023 05:30 )   PT: 13.9 sec;   INR: 1.17          PTT - ( 02 May 2023 05:30 )  PTT:26.4 sec            IMAGING/EKG/ETC   83 year old female Cantonese speaking, history obtained from daughter, PMH HTN, dementia presents with 1 week of painless jaundice and fatigue. She originally presented to her out patient GI, Dr. Aguero, and he referred her to our ED due to jaundice. In the ED, patient found to have Hb 5.6, Na 120, T bili 11.8 (direct), Alk Phos 669, , . 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 Admitted to tele for severe hyponatremia and symptomatic anemia, pt received 2 u PRBCs. Now Na self-corrected to 127 with fluid restriction only, Hb improved to 8.8. GI consulted, plan for EUS-ERCP for CBD stent placement 4/28 after Na optimized. Palliative care consulted, pending further GOC discussion. Medically stable to stepdown.      Subjective/ROS: Patient seen and examined at bedside, no acute events overnight. Patient lethargic but opens eyes to conversation. Elly at bedside. Will likely go for GI procedure this afternoon. 12pt ROS otherwise negative.    VITALS  Vital Signs Last 24 Hrs  T(C): 37.1 (02 May 2023 12:13), Max: 37.1 (02 May 2023 12:13)  T(F): 98.8 (02 May 2023 12:13), Max: 98.8 (02 May 2023 12:13)  HR: 99 (02 May 2023 12:13) (88 - 99)  BP: 151/92 (02 May 2023 12:13) (136/79 - 151/92)  BP(mean): --  RR: 18 (02 May 2023 12:13) (18 - 18)  SpO2: 93% (02 May 2023 12:13) (93% - 98%)    Parameters below as of 02 May 2023 12:13  Patient On (Oxygen Delivery Method): nasal cannula  O2 Flow (L/min): 2      CAPILLARY BLOOD GLUCOSE      POCT Blood Glucose.: 124 mg/dL (02 May 2023 12:22)  POCT Blood Glucose.: 134 mg/dL (02 May 2023 08:43)      PHYSICAL EXAM  General: NAD  Head: NC/AT; MMM; PERRL; EOMI;  Neck: Supple; no JVD  Respiratory: CTAB; no wheezes/rales/rhonchi  Cardiovascular: Regular rhythm/rate; S1/S2+, no murmurs, rubs gallops   Gastrointestinal: Soft; NTND; bowel sounds normal and present  Extremities: WWP; no edema/cyanosis  Neurological: A&Ox3, CNII-XII grossly intact; no obvious focal deficits    MEDICATIONS  (STANDING):  meropenem  IVPB 1000 milliGRAM(s) IV Intermittent every 8 hours  pantoprazole  Injectable 40 milliGRAM(s) IV Push every 12 hours    MEDICATIONS  (PRN):      No Known Allergies      LABS                        10.3   21.60 )-----------( 269      ( 02 May 2023 05:30 )             31.7     05-02    134<L>  |  101  |  15  ----------------------------<  145<H>  4.2   |  25  |  0.55    Ca    8.9      02 May 2023 05:30  Phos  2.2     05-02  Mg     2.1     05-02    TPro  5.6<L>  /  Alb  2.5<L>  /  TBili  12.4<H>  /  DBili  x   /  AST  162<H>  /  ALT  101<H>  /  AlkPhos  558<H>  05-02    PT/INR - ( 02 May 2023 05:30 )   PT: 13.9 sec;   INR: 1.17          PTT - ( 02 May 2023 05:30 )  PTT:26.4 sec            IMAGING/EKG/ETC   83 year old female Cantonese speaking, history obtained from daughter, PMH HTN, dementia presents with 1 week of painless jaundice and fatigue. She originally presented to her out patient GI, Dr. Aguero, and he referred her to our ED due to jaundice. In the ED, patient found to have Hb 5.6, Na 120, T bili 11.8 (direct), Alk Phos 669, , . 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 Admitted to tele for severe hyponatremia and symptomatic anemia, pt received 2 u PRBCs. Now Na self-corrected to 127 with fluid restriction only, Hb improved to 8.8. GI consulted, plan for EUS-ERCP for CBD stent placement which was unsuccessful so now patient to go for choledocoduodenostomy with advanced endoscopy tomorrow - for which she received 1U pRCB's overnight on 05/01 for surgical optimization. From ID standpoint, patient febrile on Friday, was on Vanc and Meropenem. ID consulted for rising WBC on Meropenem, though no more fevers, Ab were discontinued and she will be monitored off Ab therapy at this time.     Subjective/ROS: Patient seen and examined at bedside, no acute events overnight. Patient lethargic but opens eyes to conversation. Elly at bedside. Will likely go for GI procedure this afternoon. 12pt ROS otherwise negative.    VITALS  Vital Signs Last 24 Hrs  T(C): 37.1 (02 May 2023 12:13), Max: 37.1 (02 May 2023 12:13)  T(F): 98.8 (02 May 2023 12:13), Max: 98.8 (02 May 2023 12:13)  HR: 99 (02 May 2023 12:13) (88 - 99)  BP: 151/92 (02 May 2023 12:13) (136/79 - 151/92)  BP(mean): --  RR: 18 (02 May 2023 12:13) (18 - 18)  SpO2: 93% (02 May 2023 12:13) (93% - 98%)    Parameters below as of 02 May 2023 12:13  Patient On (Oxygen Delivery Method): nasal cannula  O2 Flow (L/min): 2      CAPILLARY BLOOD GLUCOSE      POCT Blood Glucose.: 124 mg/dL (02 May 2023 12:22)  POCT Blood Glucose.: 134 mg/dL (02 May 2023 08:43)      PHYSICAL EXAM  General: NAD  Head: NC/AT; MMM; PERRL; EOMI;  Neck: Supple; no JVD  Respiratory: CTAB; no wheezes/rales/rhonchi  Cardiovascular: Regular rhythm/rate; S1/S2+, no murmurs, rubs gallops   Gastrointestinal: Soft; NTND; bowel sounds normal and present  Extremities: WWP; no edema/cyanosis  Neurological: A&Ox3, CNII-XII grossly intact; no obvious focal deficits    MEDICATIONS  (STANDING):  meropenem  IVPB 1000 milliGRAM(s) IV Intermittent every 8 hours  pantoprazole  Injectable 40 milliGRAM(s) IV Push every 12 hours    MEDICATIONS  (PRN):      No Known Allergies      LABS                        10.3   21.60 )-----------( 269      ( 02 May 2023 05:30 )             31.7     05-02    134<L>  |  101  |  15  ----------------------------<  145<H>  4.2   |  25  |  0.55    Ca    8.9      02 May 2023 05:30  Phos  2.2     05-02  Mg     2.1     05-02    TPro  5.6<L>  /  Alb  2.5<L>  /  TBili  12.4<H>  /  DBili  x   /  AST  162<H>  /  ALT  101<H>  /  AlkPhos  558<H>  05-02    PT/INR - ( 02 May 2023 05:30 )   PT: 13.9 sec;   INR: 1.17          PTT - ( 02 May 2023 05:30 )  PTT:26.4 sec            IMAGING/EKG/ETC

## 2023-05-02 NOTE — PROGRESS NOTE ADULT - PROBLEM SELECTOR PLAN 8
F: Fluid restriction.   E: K > 4, Mg > 2.   N: NPO, pending mental status improvement.   DVT px: SCDs due to low hgb.   Dispo: [address] [place] [address]

## 2023-05-02 NOTE — PROGRESS NOTE ADULT - ATTENDING COMMENTS
Hyponatremia with improvement in serum Na.  Case reviewed with fellow during renal rounds.  Ulysses with details of note above.

## 2023-05-02 NOTE — PROGRESS NOTE ADULT - ASSESSMENT
83 female PMH HTN dementia presented to the ED with complaints of painless jaundice x1 week  Nephrology consulted for hyponatremia management. 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 Thank you.

## 2023-05-02 NOTE — PROGRESS NOTE ADULT - PROBLEM SELECTOR PLAN 4
RESOLVED.   Assume chronic hyponatremia, though mild pretibial edema, does not appear fluid overloaded. 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 AM cortisol and TSH wnl.     Plan:   - Keep fluid restriction to <1L.   - changed medication carrier to NS

## 2023-05-02 NOTE — PROGRESS NOTE ADULT - SUBJECTIVE AND OBJECTIVE BOX
SUBJECTIVE: Patient seen and evaluated. Pending GI intervention        MEDICATIONS  (STANDING):  meropenem  IVPB 1000 milliGRAM(s) IV Intermittent every 8 hours  pantoprazole  Injectable 40 milliGRAM(s) IV Push every 12 hours    MEDICATIONS  (PRN):      Vital Signs Last 24 Hrs  T(C): 37.1 (02 May 2023 12:13), Max: 37.1 (02 May 2023 12:13)  T(F): 98.8 (02 May 2023 12:13), Max: 98.8 (02 May 2023 12:13)  HR: 99 (02 May 2023 12:13) (88 - 99)  BP: 151/92 (02 May 2023 12:13) (136/79 - 151/92)  BP(mean): --  RR: 18 (02 May 2023 12:13) (18 - 18)  SpO2: 93% (02 May 2023 12:13) (93% - 98%)    Parameters below as of 02 May 2023 12:13  Patient On (Oxygen Delivery Method): nasal cannula  O2 Flow (L/min): 2      Physical Exam:  General: NAD, resting comfortably in bed, jaundice  Pulmonary: Nonlabored breathing, no respiratory distress  Cardiovascular: NSR  Abdominal: soft, mildly distended, no tenderness no rebound or guarding    I&O's Summary    01 May 2023 07:01  -  02 May 2023 07:00  --------------------------------------------------------  IN: 370 mL / OUT: 400 mL / NET: -30 mL        LABS:                        10.3   21.60 )-----------( 269      ( 02 May 2023 05:30 )             31.7     05-02    134<L>  |  101  |  15  ----------------------------<  145<H>  4.2   |  25  |  0.55    Ca    8.9      02 May 2023 05:30  Phos  2.2     05-02  Mg     2.1     05-02    TPro  5.6<L>  /  Alb  2.5<L>  /  TBili  12.4<H>  /  DBili  x   /  AST  162<H>  /  ALT  101<H>  /  AlkPhos  558<H>  05-02    PT/INR - ( 02 May 2023 05:30 )   PT: 13.9 sec;   INR: 1.17          PTT - ( 02 May 2023 05:30 )  PTT:26.4 sec    CAPILLARY BLOOD GLUCOSE      POCT Blood Glucose.: 124 mg/dL (02 May 2023 12:22)  POCT Blood Glucose.: 134 mg/dL (02 May 2023 08:43)    LIVER FUNCTIONS - ( 02 May 2023 05:30 )  Alb: 2.5 g/dL / Pro: 5.6 g/dL / ALK PHOS: 558 U/L / ALT: 101 U/L / AST: 162 U/L / GGT: x             RADIOLOGY & ADDITIONAL STUDIES:

## 2023-05-02 NOTE — PROGRESS NOTE ADULT - ASSESSMENT
This is a 83F with dementia and HTN presents with worsening jaundice and anemia found to have obstructive jaundice from large uncinate mass. S/p endoscopic evaluation with biopsy. Pending endoscopic choledochoduodenostomy and GJ.     - no acute surgical intervention  - f/u GI recs  - Surgery Team 1C will continue to follow. Please page Team 1 with questions/clinical changes. [phone]

## 2023-05-02 NOTE — PROGRESS NOTE ADULT - SUBJECTIVE AND OBJECTIVE BOX
Patient is a 83y Female seen and evaluated at bedside. No acute distress, sNA at 134.       Meds:    meropenem  IVPB 1000 every 8 hours  pantoprazole  Injectable 40 every 12 hours  potassium phosphate IVPB 30 once      T(C): , Max: 36.5 (05-02-23 @ 05:22)  T(F): , Max: 97.7 (05-02-23 @ 05:22)  HR: 94 (05-02-23 @ 05:22)  BP: 148/88 (05-02-23 @ 05:22)  RR: 18 (05-02-23 @ 05:22)  SpO2: 95% (05-02-23 @ 05:22)  Wt(kg): --    05-01 @ 07:01  -  05-02 @ 07:00  --------------------------------------------------------  IN: 370 mL / OUT: 400 mL / NET: -30 mL          Review of Systems:  ROS negative except as per HPI      PHYSICAL EXAM:  Gen: Reclining in bed at time of exam, appears stated age  HEENT: NCAT, MMM, clear OP  Neck: supple, trachea at midline  CV: RRR, +S1/S2  Pulm: adequate respiratory effort, no increase in work of breathing  Abd: mildly distended, normal BS, soft NT   Skin: warm and dry,   Ext: WWP, no LE edema  Neuro: AOx3, no gross focal neurological deficits    LABS:                        10.3   21.60 )-----------( 269      ( 02 May 2023 05:30 )             31.7     05-02    134<L>  |  101  |  15  ----------------------------<  145<H>  4.2   |  25  |  0.55    Ca    8.9      02 May 2023 05:30  Phos  2.2     05-02  Mg     2.1     05-02    TPro  5.6<L>  /  Alb  2.5<L>  /  TBili  12.4<H>  /  DBili  x   /  AST  162<H>  /  ALT  101<H>  /  AlkPhos  558<H>  05-02      PT/INR - ( 02 May 2023 05:30 )   PT: 13.9 sec;   INR: 1.17          PTT - ( 02 May 2023 05:30 )  PTT:26.4 sec          RADIOLOGY & ADDITIONAL STUDIES:

## 2023-05-02 NOTE — PROGRESS NOTE ADULT - PROBLEM SELECTOR PLAN 1
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 GI and Surgery consultation placed with plans for ERCP with CBD stenting/biopsy for diagnosis.   CTAP 4/25: cystic/necrotic lesion in uncinate process of pancreas 6x10cm, CBD dilation 2.4cm, pulmonary nodules suspicious for metastasis.   Patient now s/p EUS-ERCP on 4/27 for biliary stent, unable to place due to mass morphology, plan for choledocoduodenostomy today.     Plan:   - advanced cholodocoduodenostomy today for biliary obstruction relief  - further GOC conversations for outpatient planning upon discharge   - monitor for post ERCP complications (CBC, pain control/monitoring)   - Patient is DNR/DNI (Living will scanned into chart)

## 2023-05-02 NOTE — PROGRESS NOTE ADULT - SUBJECTIVE AND OBJECTIVE BOX
Patient is a 83y old  Female who presents with a chief complaint of Anemia concern for hemolysis, SIRS (01 May 2023 18:46)    INTERVAL EVENTS: s/p 1U PRBC last night  for Hgb 7.7 for the goal of Hgb>8 for the GI procedure.     SUBJECTIVE:  Patient was seen and examined at bedside. awake, alert oriented x0 at baseline, on NC2L, no tachypnea or WOB. Non-verbal but answers simple questions by nodding or shaking her head. Denies abd pain or SOB.    Review of systems: No fever, chills, dizziness, HA, Changes in vision, CP, dyspnea, nausea or vomiting, dysuria, changes in bowel movements, LE edema. Rest of 12 point Review of systems negative unless otherwise documented elsewhere in note.     Diet, NPO after Midnight:      NPO Start Date: 01-May-2023,   NPO Start Time: 23:59 (05-01-23 @ 08:52) [Active]  Diet, NPO:   Except Medications  With Ice Chips/Sips of Water (04-28-23 @ 11:19) [Active]      MEDICATIONS:  MEDICATIONS  (STANDING):  meropenem  IVPB 1000 milliGRAM(s) IV Intermittent every 8 hours  pantoprazole  Injectable 40 milliGRAM(s) IV Push every 12 hours    MEDICATIONS  (PRN):      Allergies    No Known Allergies    Intolerances        OBJECTIVE:  Vital Signs Last 24 Hrs  T(C): 36.5 (02 May 2023 05:22), Max: 36.5 (02 May 2023 05:22)  T(F): 97.7 (02 May 2023 05:22), Max: 97.7 (02 May 2023 05:22)  HR: 94 (02 May 2023 05:22) (88 - 94)  BP: 148/88 (02 May 2023 05:22) (136/79 - 148/88)  BP(mean): --  RR: 18 (02 May 2023 05:22) (18 - 18)  SpO2: 95% (02 May 2023 05:22) (95% - 98%)    Parameters below as of 02 May 2023 05:22  Patient On (Oxygen Delivery Method): room air      I&O's Summary    01 May 2023 07:01  -  02 May 2023 07:00  --------------------------------------------------------  IN: 370 mL / OUT: 400 mL / NET: -30 mL        PHYSICAL EXAM:  Gen: Reclining in bed at time of exam, appears stated age  HEENT: NCAT, MMM, clear OP  Neck: supple, trachea at midline  CV: RRR, +S1/S2  Pulm: adequate respiratory effort, no increase in work of breathing  Abd: mildly distended, normal BS, soft NT   Skin: warm and dry,   Ext: WWP, no LE edema  Neuro: AOx3, no gross focal neurological deficits  Psych: affect and behavior appropriate, pleasant at time of interview     LABS:                        10.3   21.60 )-----------( 269      ( 02 May 2023 05:30 )             31.7     05-01    136  |  104  |  13  ----------------------------<  151<H>  4.2   |  22  |  0.50    Ca    8.4      01 May 2023 05:30  Phos  2.3     05-01  Mg     2.1     05-01    TPro  5.3<L>  /  Alb  2.2<L>  /  TBili  11.6<H>  /  DBili  x   /  AST  142<H>  /  ALT  90<H>  /  AlkPhos  501<H>  05-01    LIVER FUNCTIONS - ( 01 May 2023 05:30 )  Alb: 2.2 g/dL / Pro: 5.3 g/dL / ALK PHOS: 501 U/L / ALT: 90 U/L / AST: 142 U/L / GGT: x           PT/INR - ( 02 May 2023 05:30 )   PT: 13.9 sec;   INR: 1.17          PTT - ( 02 May 2023 05:30 )  PTT:26.4 sec  CAPILLARY BLOOD GLUCOSE      POCT Blood Glucose.: 113 mg/dL (01 May 2023 12:05)  POCT Blood Glucose.: 158 mg/dL (01 May 2023 08:32)        MICRODATA:      RADIOLOGY/OTHER STUDIES:

## 2023-05-03 NOTE — CONSULT NOTE ADULT - CONSULT REQUESTED DATE/TIME
25-Apr-2023 17:14
26-Apr-2023 10:47
02-May-2023 10:15
25-Apr-2023 13:00
25-Apr-2023 15:04
26-Apr-2023 08:34
25-Apr-2023 16:04
03-May-2023 19:34
27-Apr-2023 10:28

## 2023-05-03 NOTE — ANESTHESIA FOLLOW-UP NOTE - NSEVALATIONFT_GEN_ALL_CORE
Took patient directly to ICU after procedure, remaining intubated for airway protection with a concern for periprocedural pulmonary aspiration.

## 2023-05-03 NOTE — PROGRESS NOTE ADULT - ATTENDING COMMENTS
pt seen and examined with daughter at bedside, seen with ID.   on oxygen nasal canula, looks frail.   not much verbalizing, weak, npo for GI procedure.  exam - awake, good air entry, RRR. abdominal exam-benign.    83 y/oF  Cantonese speaking PMH Dementia,  HTN, LEW ( baseline Hgb 9.7), presented with 1 week of jaundice, fatigue and decreased po intake. Pt was seen by PMD Dr. Kary Ly on 4/24/23, blood test showed Tbili 10.8, , , , WBC 22.8K, Hgb 6, and Serum Sodium 125.Pt had normal LFT one month ago ( TBili<0.2, ALP 56, AST 18 and ALT 11).  Pt was sent to [facility]/Dr. Yonas Evans whom referred pt to the hospital for further management. 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  # Failure to thrive   # Dementia   # Leukocytosis /possible leukemoid reaction.  # Severe Anemia - sp 3 units of pbrc.  # N/V   # Abd pain   # Duodenal ulcer/ Gwendolyn Ruano tear  # Coagulopathy       -  plan on endoscopic choledochoduodenostomy ( stent) for palliative measure ( to relieve malignant obstruction - t bili is 12 )  - c/w PPI iv bid   - c/w Reglan 10mg iv q6hr as pro-motility agent   - Tumor markers: CA 19-9=306 and CEA level =6.3  - Hyponatremia resolved.   - Anemia- sp 3 units PRBC since admission- hb 10.3 on 5/3-    - Leukocytosis:  possibly leukemoid, wbc in 20s, clinically not toxic appearing though she look frail, on antibiotics for almost one week, dw ID - hold off antibiotics for now - if post GI procedure -fever/chills/septic picture would need to restart.  - f/u BCx : no growth for 12hrs  - ID approval appreciated from   -  viral hepatitis serology negative     - advanced pancreatic vs duodenal CA- both biopsy with poorly differentiated adeno with sarcoid feature-  Heme Onc f/u appreciated, not a candidate for chemotherapy given frailed condition  ECOG is now at 4 ( she need full assist in daily activities)    - Surgery evaluation appreciated, note reviewed. not candidate for surgery.     - Palliative care consult appreciated for GOC discussion ( initiated),Pt is DNR/DNI, pt not a candidate for home hospice due to lack of family support  - Rehab consult appreciated rec: [facility] Vs home with home PT with increased HHA for assistance   - code status: DNR/DNI ( pt has living will) and HCP previously completed.   1st HCP: Alessandro Mares ( )  Alternate HCP: Elly Mares ( daughter)   - VTE ppx; SCDs    Contact:  Elly Mares ( daughter) [phone]   PMD: Dr. Kary Ozuna [phone]  GI: Dr. Yonas Evans/[facility] [phone]    Disposition: medically active, d/c possible later this week awaiting biliary decompression/Choledochoduodenostomy   SW consult, pt needs wheelchair, rolling walker, and increasing HHA ( pt will benefit 24/7 HHA and at least 12hr HHA given frailed condition from this newly dx advanced metastatic pancreatic Ca Vs Deuoenal Ca w/ lung mets and biliary obs and FTT).    Nutritional Assessment:  · Nutritional Assessment	This patient has been assessed with a concern for Malnutrition and has been determined to have a diagnosis/diagnoses of Severe protein-calorie malnutrition.    This patient is being managed with:   Diet NPO after Midnight-     NPO Start Date: 01-May-2023   NPO Start Time: 23:59  Entered: May  1 2023  8:52AM    Diet NPO-  Except Medications  With Ice Chips/Sips of Water  Entered: Apr 28 2023 11:18AM      poc dw daughter, Medical team Dr. Bruno Price, Dr. Ilya Medina. ID Dr. Donato.  high risk due to comorbs =newly diagnosed cancer, with significant anemia required transfusion, malignant biliary obstruction and failure to thrive.   prognosis is guarded.

## 2023-05-03 NOTE — PROGRESS NOTE ADULT - SUBJECTIVE AND OBJECTIVE BOX
INFECTIOUS DISEASES CONSULT FOLLOW-UP NOTE    INTERVAL HPI/OVERNIGHT EVENTS:  Lethargic. Not in distress    ROS:   Constitutional, eyes, ENT, cardiovascular, respiratory, gastrointestinal, genitourinary, integumentary, neurological, psychiatric and heme/lymph are otherwise negative other than noted above       ANTIBIOTICS/RELEVANT:    MEDICATIONS  (STANDING):  pantoprazole Infusion 8 mG/Hr (10 mL/Hr) IV Continuous <Continuous>    MEDICATIONS  (PRN):  ondansetron Injectable 4 milliGRAM(s) IV Push every 8 hours PRN Nausea and/or Vomiting        Vital Signs Last 24 Hrs  T(C): 37.1 (03 May 2023 05:19), Max: 37.1 (02 May 2023 12:13)  T(F): 98.7 (03 May 2023 05:19), Max: 98.8 (02 May 2023 12:13)  HR: 98 (03 May 2023 05:19) (96 - 99)  BP: 133/85 (03 May 2023 05:19) (127/84 - 151/92)  BP(mean): --  RR: 18 (03 May 2023 06:54) (18 - 18)  SpO2: 96% (03 May 2023 06:54) (90% - 96%)    Parameters below as of 03 May 2023 06:54  Patient On (Oxygen Delivery Method): nasal cannula  O2 Flow (L/min): 3      PHYSICAL EXAM:  Constitutional: alert, NAD  Eyes: the sclera and conjunctiva were normal.   ENT: the ears and nose were normal in appearance.   Neck: the appearance of the neck was normal and the neck was supple.   Pulmonary: no respiratory distress and lungs were clear to auscultation bilaterally.   Heart: heart rate was normal and rhythm regular, normal S1 and S2  Vascular:. there was no peripheral edema  Abdomen: normal bowel sounds, soft, non-tender  Neurological: no focal deficits.   Psychiatric: the affect was normal        LABS:                        10.3   27.05 )-----------( 264      ( 03 May 2023 05:30 )             32.7     05-03    139  |  104  |  20  ----------------------------<  139<H>  4.3   |  24  |  0.59    Ca    8.5      03 May 2023 05:30  Phos  2.7     05-03  Mg     2.2     05-03    TPro  5.8<L>  /  Alb  2.6<L>  /  TBili  12.1<H>  /  DBili  x   /  AST  147<H>  /  ALT  95<H>  /  AlkPhos  532<H>  05-03    PT/INR - ( 03 May 2023 05:30 )   PT: 13.7 sec;   INR: 1.15          PTT - ( 03 May 2023 05:30 )  PTT:26.5 sec      MICROBIOLOGY:      RADIOLOGY & ADDITIONAL STUDIES:  Reviewed

## 2023-05-03 NOTE — CONSULT NOTE ADULT - SUBJECTIVE AND OBJECTIVE BOX
Patient is a 83y old  Female who presents with a chief complaint of Anemia concern for hemolysis, SIRS (03 May 2023 10:06)      Consult reason: unable to extubate after EGD   ED vitals: T   HR   RR  BP  SpO2  Labs signficant:  Imaging/EKG:   Interventions:    HPI:  83 year old female Cantonese speaking, history obtained from daughter, PMH HTN, dementia presents with 1 week of painless jaundice and fatigue. She originally presented to her out patient GI, Dr. Aguero, and he referred her to our ED due to jaundice. In the ED, patient found to have Hb 5.6, Na 120, T bili 11.8 (direct), Alk Phos 669, , . 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 Admitted to [facility] for severe hyponatremia and symptomatic anemia, pt received 2 u PRBCs. Now Na self-corrected to 127 with fluid restriction only, Hb improved to 8.8. GI consulted, plan for EUS-ERCP for CBD stent placement which was unsuccessful with plans to undergo choledocoduodenostomy with advanced endoscopy today - for which she received 1U pRCB's overnight on 05/01 for surgical optimization. From ID standpoint, patient febrile on Friday 4/28, was on Vanc and Meropenem. ID consulted for rising WBC on Meropenem, though no more fevers, Ab were discontinued. On evening of 5/3, pt went for choledocoduodenostomy with advanced endoscopy. During procedure, DNR/DNI order temporarily rescinded, pt found to have profuse gastric contents and was unable to be extubated. Pt is now pending upgrade to MICU after procedure.        Consult reason: hyponatremia, anemia  ED vitals: T 98.6  -->98  RR 18 /61 SpO2 94% RA  Labs signficant: WBC 22.35, 92% neutrophils, hgb 5.6, MCV 94.6, Na 122, serum osm 261, , , , total protein 5.8, albumin 2.8, total bili 10.8, direct bili 8.2, indirect bili 2.6, lipase 12  Imaging/EKG: CXR w patchy b/l opacities, ekg w sinus tachycardia to 122  Interventions: 1 L NS, pantoprazole IV 80mg    HPI: 84 y/o Cantonese speaking, history obtained from daughter, F PMH HTN, dementia presents with 1 week of jaundice. She presented to her out patient GI, Dr. Aguero, and he referred her to our ED due to jaundice. She has had yellowing of the skin of 1 week and worsening fatigue over the same time. She usually ambulates with assistance but for the past 1 day she has not been too tired to get up. Other then fatigue the daughter notes no change in mental status. Although the patient denied pain, her daughter has seen her intermittently holding her stomach in discomfort. She has had reduced PO intake and unspecified amount of weight loss which the daughter contributes to not eating as much. She denies any black stools, BRBPR, diarrhea, vomiting, f/c, SOB, CP.    One month ago her hgb was 9.7, ALP 56, AST 18, ALT 11, total protein 6.8, Albumin 3.8, total bilirubin <0.2 (25 Apr 2023 19:50)      Allergies    No Known Allergies    Intolerances      Home Medications:  Aricept 10 mg oral tablet: 1 orally once a day (25 Apr 2023 18:59)  Avapro 75 mg oral tablet: 0.5 tab(s) orally once a day (25 Apr 2023 19:01)  calcium (as carbonate) 600 mg oral tablet: 1 orally once a day (25 Apr 2023 19:01)  ferrous fumarate 325 mg (106 mg elemental iron) oral tablet: 1 orally once a day (25 Apr 2023 19:01)  memantine 5 mg oral tablet: 1 orally 2 times a day (25 Apr 2023 18:58)  PARoxetine 20 mg oral tablet: 1 orally once a day (25 Apr 2023 19:01)  Vascepa 1 g oral capsule: 2 orally once a day (25 Apr 2023 18:59)      PHYSICAL EXAM     ICU Vital Signs Last 24 Hrs  T(C): 36.9 (03 May 2023 16:26), Max: 37.1 (03 May 2023 05:19)  T(F): 98.5 (03 May 2023 11:36), Max: 98.7 (03 May 2023 05:19)  HR: 101 (03 May 2023 16:26) (96 - 101)  BP: 132/83 (03 May 2023 16:26) (127/84 - 133/85)  BP(mean): 85 (03 May 2023 16:26) (85 - 85)  ABP: --  ABP(mean): --  RR: 20 (03 May 2023 16:26) (18 - 20)  SpO2: 96% (03 May 2023 16:26) (90% - 96%)    O2 Parameters below as of 03 May 2023 16:26    O2 Flow (L/min): 3      EXAM  pt in endoscopy suite in procedure at time of consult         LABS:                          10.3   27.05 )-----------( 264      ( 03 May 2023 05:30 )             32.7                                               05-03    139  |  104  |  20  ----------------------------<  139<H>  4.3   |  24  |  0.59    Ca    8.5      03 May 2023 05:30  Phos  2.7     05-03  Mg     2.2     05-03    TPro  5.8<L>  /  Alb  2.6<L>  /  TBili  12.1<H>  /  DBili  x   /  AST  147<H>  /  ALT  95<H>  /  AlkPhos  532<H>  05-03      PT/INR - ( 03 May 2023 05:30 )   PT: 13.7 sec;   INR: 1.15          PTT - ( 03 May 2023 05:30 )  PTT:26.5 sec                                                                                     LIVER FUNCTIONS - ( 03 May 2023 05:30 )  Alb: 2.6 g/dL / Pro: 5.8 g/dL / ALK PHOS: 532 U/L / ALT: 95 U/L / AST: 147 U/L / GGT: x                                                                                             MEDICATIONS  (STANDING):  pantoprazole Infusion 8 mG/Hr (10 mL/Hr) IV Continuous <Continuous>    MEDICATIONS  (PRN):  ondansetron Injectable 4 milliGRAM(s) IV Push every 8 hours PRN Nausea and/or Vomiting         Patient is a 83y old  Female who presents with a chief complaint of Anemia concern for hemolysis, SIRS (03 May 2023 10:06)      HPI:  83 year old female Cantonese speaking, history obtained from daughter, PMH HTN, dementia presents with 1 week of painless jaundice and fatigue. She originally presented to her out patient GI, Dr. Aguero, and he referred her to our ED due to jaundice. In the ED, patient found to have Hb 5.6, Na 120, T bili 11.8 (direct), Alk Phos 669, , . 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 Admitted to tele for severe hyponatremia and symptomatic anemia, pt received 2 u PRBCs. Now Na self-corrected to 127 with fluid restriction only, Hb improved to 8.8. GI consulted, plan for EUS-ERCP for CBD stent placement which was unsuccessful with plans to undergo choledocoduodenostomy with advanced endoscopy today - for which she received 1U pRCB's overnight on 05/01 for surgical optimization. From ID standpoint, patient febrile on Friday 4/28, was on Vanc and Meropenem. ID consulted for rising WBC on Meropenem, though no more fevers, Ab were discontinued. On evening of 5/3, pt went for choledocoduodenostomy with advanced endoscopy. During procedure, DNR/DNI order temporarily rescinded, pt found to have profuse gastric contents and was unable to be extubated. Pt is now pending upgrade to MICU after procedure.        Consult reason: hyponatremia, anemia  ED vitals: T 98.6  -->98  RR 18 /61 SpO2 94% RA  Labs signficant: WBC 22.35, 92% neutrophils, hgb 5.6, MCV 94.6, Na 122, serum osm 261, , , , total protein 5.8, albumin 2.8, total bili 10.8, direct bili 8.2, indirect bili 2.6, lipase 12  Imaging/EKG: CXR w patchy b/l opacities, ekg w sinus tachycardia to 122  Interventions: 1 L NS, pantoprazole IV 80mg    HPI: 82 y/o Cantonese speaking, history obtained from daughter, F PMH HTN, dementia presents with 1 week of jaundice. She presented to her out patient GI, Dr. Aguero, and he referred her to our ED due to jaundice. She has had yellowing of the skin of 1 week and worsening fatigue over the same time. She usually ambulates with assistance but for the past 1 day she has not been too tired to get up. Other then fatigue the daughter notes no change in mental status. Although the patient denied pain, her daughter has seen her intermittently holding her stomach in discomfort. She has had reduced PO intake and unspecified amount of weight loss which the daughter contributes to not eating as much. She denies any black stools, BRBPR, diarrhea, vomiting, f/c, SOB, CP.    One month ago her hgb was 9.7, ALP 56, AST 18, ALT 11, total protein 6.8, Albumin 3.8, total bilirubin <0.2 (25 Apr 2023 19:50)      Allergies    No Known Allergies    Intolerances      Home Medications:  Aricept 10 mg oral tablet: 1 orally once a day (25 Apr 2023 18:59)  Avapro 75 mg oral tablet: 0.5 tab(s) orally once a day (25 Apr 2023 19:01)  calcium (as carbonate) 600 mg oral tablet: 1 orally once a day (25 Apr 2023 19:01)  ferrous fumarate 325 mg (106 mg elemental iron) oral tablet: 1 orally once a day (25 Apr 2023 19:01)  memantine 5 mg oral tablet: 1 orally 2 times a day (25 Apr 2023 18:58)  PARoxetine 20 mg oral tablet: 1 orally once a day (25 Apr 2023 19:01)  Vascepa 1 g oral capsule: 2 orally once a day (25 Apr 2023 18:59)      PHYSICAL EXAM     ICU Vital Signs Last 24 Hrs  T(C): 36.9 (03 May 2023 16:26), Max: 37.1 (03 May 2023 05:19)  T(F): 98.5 (03 May 2023 11:36), Max: 98.7 (03 May 2023 05:19)  HR: 101 (03 May 2023 16:26) (96 - 101)  BP: 132/83 (03 May 2023 16:26) (127/84 - 133/85)  BP(mean): 85 (03 May 2023 16:26) (85 - 85)  ABP: --  ABP(mean): --  RR: 20 (03 May 2023 16:26) (18 - 20)  SpO2: 96% (03 May 2023 16:26) (90% - 96%)    O2 Parameters below as of 03 May 2023 16:26    O2 Flow (L/min): 3      EXAM  pt in endoscopy suite in procedure at time of consult         LABS:                          10.3   27.05 )-----------( 264      ( 03 May 2023 05:30 )             32.7                                               05-03    139  |  104  |  20  ----------------------------<  139<H>  4.3   |  24  |  0.59    Ca    8.5      03 May 2023 05:30  Phos  2.7     05-03  Mg     2.2     05-03    TPro  5.8<L>  /  Alb  2.6<L>  /  TBili  12.1<H>  /  DBili  x   /  AST  147<H>  /  ALT  95<H>  /  AlkPhos  532<H>  05-03      PT/INR - ( 03 May 2023 05:30 )   PT: 13.7 sec;   INR: 1.15          PTT - ( 03 May 2023 05:30 )  PTT:26.5 sec                                                                                     LIVER FUNCTIONS - ( 03 May 2023 05:30 )  Alb: 2.6 g/dL / Pro: 5.8 g/dL / ALK PHOS: 532 U/L / ALT: 95 U/L / AST: 147 U/L / GGT: x                                                                                             MEDICATIONS  (STANDING):  pantoprazole Infusion 8 mG/Hr (10 mL/Hr) IV Continuous <Continuous>    MEDICATIONS  (PRN):  ondansetron Injectable 4 milliGRAM(s) IV Push every 8 hours PRN Nausea and/or Vomiting         ICU CONSULT NOTE      HPI:  83 year old female Cantonese speaking, history obtained from daughter, PMH HTN, dementia presents with 1 week of painless jaundice and fatigue. She originally presented to her out patient GI, Dr. Aguero, and he referred her to our ED due to jaundice. In the ED, patient found to have Hb 5.6, Na 120, T bili 11.8 (direct), Alk Phos 669, , . 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 Admitted to tele for severe hyponatremia and symptomatic anemia, pt received 2 u PRBCs. Now Na self-corrected to 127 with fluid restriction only, Hb improved to 8.8. GI consulted, plan for EUS-ERCP for CBD stent placement which was unsuccessful with plans to undergo choledocoduodenostomy with advanced endoscopy today - for which she received 1U pRCB's overnight on 05/01 for surgical optimization. From ID standpoint, patient febrile on Friday 4/28, was on Vanc and Meropenem. ID consulted for rising WBC on Meropenem, though no more fevers, Ab were discontinued. On evening of 5/3, pt went for choledocoduodenostomy with advanced endoscopy. During procedure, DNR/DNI order temporarily rescinded, pt found to have profuse gastric contents and was unable to be extubated. Pt is now pending upgrade to MICU after procedure.        Consult reason: hyponatremia, anemia  ED vitals: T 98.6  -->98  RR 18 /61 SpO2 94% RA  Labs signficant: WBC 22.35, 92% neutrophils, hgb 5.6, MCV 94.6, Na 122, serum osm 261, , , , total protein 5.8, albumin 2.8, total bili 10.8, direct bili 8.2, indirect bili 2.6, lipase 12  Imaging/EKG: CXR w patchy b/l opacities, ekg w sinus tachycardia to 122  Interventions: 1 L NS, pantoprazole IV 80mg    HPI: 84 y/o Cantonese speaking, history obtained from daughter, F PMH HTN, dementia presents with 1 week of jaundice. She presented to her out patient GI, Dr. Aguero, and he referred her to our ED due to jaundice. She has had yellowing of the skin of 1 week and worsening fatigue over the same time. She usually ambulates with assistance but for the past 1 day she has not been too tired to get up. Other then fatigue the daughter notes no change in mental status. Although the patient denied pain, her daughter has seen her intermittently holding her stomach in discomfort. She has had reduced PO intake and unspecified amount of weight loss which the daughter contributes to not eating as much. She denies any black stools, BRBPR, diarrhea, vomiting, f/c, SOB, CP.    One month ago her hgb was 9.7, ALP 56, AST 18, ALT 11, total protein 6.8, Albumin 3.8, total bilirubin <0.2 (25 Apr 2023 19:50)      Allergies    No Known Allergies    Intolerances      Home Medications:  Aricept 10 mg oral tablet: 1 orally once a day (25 Apr 2023 18:59)  Avapro 75 mg oral tablet: 0.5 tab(s) orally once a day (25 Apr 2023 19:01)  calcium (as carbonate) 600 mg oral tablet: 1 orally once a day (25 Apr 2023 19:01)  ferrous fumarate 325 mg (106 mg elemental iron) oral tablet: 1 orally once a day (25 Apr 2023 19:01)  memantine 5 mg oral tablet: 1 orally 2 times a day (25 Apr 2023 18:58)  PARoxetine 20 mg oral tablet: 1 orally once a day (25 Apr 2023 19:01)  Vascepa 1 g oral capsule: 2 orally once a day (25 Apr 2023 18:59)      PHYSICAL EXAM     ICU Vital Signs Last 24 Hrs  T(C): 36.9 (03 May 2023 16:26), Max: 37.1 (03 May 2023 05:19)  T(F): 98.5 (03 May 2023 11:36), Max: 98.7 (03 May 2023 05:19)  HR: 101 (03 May 2023 16:26) (96 - 101)  BP: 132/83 (03 May 2023 16:26) (127/84 - 133/85)  BP(mean): 85 (03 May 2023 16:26) (85 - 85)  ABP: --  ABP(mean): --  RR: 20 (03 May 2023 16:26) (18 - 20)  SpO2: 96% (03 May 2023 16:26) (90% - 96%)    O2 Parameters below as of 03 May 2023 16:26    O2 Flow (L/min): 3      EXAM  pt in endoscopy suite in procedure at time of consult         LABS:                          10.3   27.05 )-----------( 264      ( 03 May 2023 05:30 )             32.7                                               05-03    139  |  104  |  20  ----------------------------<  139<H>  4.3   |  24  |  0.59    Ca    8.5      03 May 2023 05:30  Phos  2.7     05-03  Mg     2.2     05-03    TPro  5.8<L>  /  Alb  2.6<L>  /  TBili  12.1<H>  /  DBili  x   /  AST  147<H>  /  ALT  95<H>  /  AlkPhos  532<H>  05-03      PT/INR - ( 03 May 2023 05:30 )   PT: 13.7 sec;   INR: 1.15          PTT - ( 03 May 2023 05:30 )  PTT:26.5 sec                                                                                     LIVER FUNCTIONS - ( 03 May 2023 05:30 )  Alb: 2.6 g/dL / Pro: 5.8 g/dL / ALK PHOS: 532 U/L / ALT: 95 U/L / AST: 147 U/L / GGT: x                                                                                             MEDICATIONS  (STANDING):  pantoprazole Infusion 8 mG/Hr (10 mL/Hr) IV Continuous <Continuous>    MEDICATIONS  (PRN):  ondansetron Injectable 4 milliGRAM(s) IV Push every 8 hours PRN Nausea and/or Vomiting         ICU CONSULT NOTE      HPI:  83 year old female Cantonese speaking, history obtained from daughter, PMH HTN, dementia presents with 1 week of painless jaundice and fatigue. She originally presented to her out patient GI, Dr. Aguero, and he referred her to our ED due to jaundice. In the ED, patient found to have Hb 5.6, Na 120, T bili 11.8 (direct), Alk Phos 669, , . 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 Admitted to tele for severe hyponatremia and symptomatic anemia, pt received 2 u PRBCs. Now Na self-corrected to 127 with fluid restriction only, Hb improved to 8.8. GI consulted, plan for EUS-ERCP for CBD stent placement which was unsuccessful with plans to undergo choledocoduodenostomy with advanced endoscopy today - for which she received 1U pRCB's overnight on 05/01 for surgical optimization. From ID standpoint, patient febrile on Friday 4/28, was on Vanc and Meropenem. ID consulted for rising WBC on Meropenem, though no more fevers, Ab were discontinued. On evening of 5/3, pt went for choledocoduodenostomy with advanced endoscopy. During procedure, DNR/DNI order temporarily rescinded, pt found to have profuse gastric contents and was unable to be extubated. Pt is now pending upgrade to MICU after procedure.        Consult reason: hyponatremia, anemia  ED vitals: T 98.6  -->98  RR 18 /61 SpO2 94% RA  Labs signficant: WBC 22.35, 92% neutrophils, hgb 5.6, MCV 94.6, Na 122, serum osm 261, , , , total protein 5.8, albumin 2.8, total bili 10.8, direct bili 8.2, indirect bili 2.6, lipase 12  Imaging/EKG: CXR w patchy b/l opacities, ekg w sinus tachycardia to 122  Interventions: 1 L NS, pantoprazole IV 80mg    HPI: 82 y/o Cantonese speaking, history obtained from daughter, F PMH HTN, dementia presents with 1 week of jaundice. She presented to her out patient GI, Dr. Aguero, and he referred her to our ED due to jaundice. She has had yellowing of the skin of 1 week and worsening fatigue over the same time. She usually ambulates with assistance but for the past 1 day she has not been too tired to get up. Other then fatigue the daughter notes no change in mental status. Although the patient denied pain, her daughter has seen her intermittently holding her stomach in discomfort. She has had reduced PO intake and unspecified amount of weight loss which the daughter contributes to not eating as much. She denies any black stools, BRBPR, diarrhea, vomiting, f/c, SOB, CP.    One month ago her hgb was 9.7, ALP 56, AST 18, ALT 11, total protein 6.8, Albumin 3.8, total bilirubin <0.2 (25 Apr 2023 19:50)      Allergies    No Known Allergies    Intolerances      Home Medications:  Aricept 10 mg oral tablet: 1 orally once a day (25 Apr 2023 18:59)  Avapro 75 mg oral tablet: 0.5 tab(s) orally once a day (25 Apr 2023 19:01)  calcium (as carbonate) 600 mg oral tablet: 1 orally once a day (25 Apr 2023 19:01)  ferrous fumarate 325 mg (106 mg elemental iron) oral tablet: 1 orally once a day (25 Apr 2023 19:01)  memantine 5 mg oral tablet: 1 orally 2 times a day (25 Apr 2023 18:58)  PARoxetine 20 mg oral tablet: 1 orally once a day (25 Apr 2023 19:01)  Vascepa 1 g oral capsule: 2 orally once a day (25 Apr 2023 18:59)      PHYSICAL EXAM     ICU Vital Signs Last 24 Hrs  T(C): 36.9 (03 May 2023 16:26), Max: 37.1 (03 May 2023 05:19)  T(F): 98.5 (03 May 2023 11:36), Max: 98.7 (03 May 2023 05:19)  HR: 101 (03 May 2023 16:26) (96 - 101)  BP: 132/83 (03 May 2023 16:26) (127/84 - 133/85)  BP(mean): 85 (03 May 2023 16:26) (85 - 85)  ABP: --  ABP(mean): --  RR: 20 (03 May 2023 16:26) (18 - 20)  SpO2: 96% (03 May 2023 16:26) (90% - 96%)    O2 Parameters below as of 03 May 2023 16:26    O2 Flow (L/min): 3      EXAM  PHYSICAL EXAM:    GENERAL: intubated, non responsive to physical stimuli   HEAD:  Normocephalic, atraumatic  HEENT: intubated  NECK: Supple, No JVD  NERVOUS SYSTEM:  non responsive to physical or verbal stimuli  CHEST/LUNG: decreased breath sounds at b/l bases   HEART: Regular rate and rhythm  ABDOMEN: Soft, distended   GENITOURINARY: Voiding, no palpable bladder  EXTREMITIES:   No clubbing, cyanosis, or edema  SKIN-jaundiced                 LABS:                          10.3   27.05 )-----------( 264      ( 03 May 2023 05:30 )             32.7                                               05-03    139  |  104  |  20  ----------------------------<  139<H>  4.3   |  24  |  0.59    Ca    8.5      03 May 2023 05:30  Phos  2.7     05-03  Mg     2.2     05-03    TPro  5.8<L>  /  Alb  2.6<L>  /  TBili  12.1<H>  /  DBili  x   /  AST  147<H>  /  ALT  95<H>  /  AlkPhos  532<H>  05-03      PT/INR - ( 03 May 2023 05:30 )   PT: 13.7 sec;   INR: 1.15          PTT - ( 03 May 2023 05:30 )  PTT:26.5 sec                                                                                     LIVER FUNCTIONS - ( 03 May 2023 05:30 )  Alb: 2.6 g/dL / Pro: 5.8 g/dL / ALK PHOS: 532 U/L / ALT: 95 U/L / AST: 147 U/L / GGT: x                                                                                             MEDICATIONS  (STANDING):  pantoprazole Infusion 8 mG/Hr (10 mL/Hr) IV Continuous <Continuous>    MEDICATIONS  (PRN):  ondansetron Injectable 4 milliGRAM(s) IV Push every 8 hours PRN Nausea and/or Vomiting

## 2023-05-03 NOTE — PROGRESS NOTE ADULT - PROBLEM SELECTOR PLAN 3
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 GI and Surgery consultation placed with plans for ERCP with CBD stenting/biopsy for diagnosis.   CTAP 4/25: cystic/necrotic lesion in uncinate process of pancreas 6x10cm, CBD dilation 2.4cm, pulmonary nodules suspicious for metastasis.   Patient now s/p EUS-ERCP on 4/27 for biliary stent, unable to place due to mass morphology, plan for choledocoduodenostomy today.     Plan:   - advanced cholodocoduodenostomy today for biliary obstruction relief  - further GOC conversations for outpatient planning upon discharge   - monitor for post ERCP complications (CBC, pain control/monitoring)   - Patient is DNR/DNI (Living will scanned into chart) Normocytic anemia w hgb 5.7 (down from 9.7 1 month ago on ferrous sulfate PO 325mg daily). S/p 2 units PRBC transfusion with improvement in hemoglobin. LDH and hapto wnl. Borderline iron deficiency noted on lab work. Most recently, received 1 unit pRBC's overnight 05/01 in preparation for advanced endoscopy. Hgb on 5/3 of 10.3 with Hct of 32.7   Plan:   - Maintain active T&S.   - Transfuse <7

## 2023-05-03 NOTE — PRE-ANESTHESIA EVALUATION ADULT - NSANTHOSAYNRD_GEN_A_CORE
No. LUKAS screening performed.  STOP BANG Legend: 0-2 = LOW Risk; 3-4 = INTERMEDIATE Risk; 5-8 = HIGH Risk
No. LUKAS screening performed.  STOP BANG Legend: 0-2 = LOW Risk; 3-4 = INTERMEDIATE Risk; 5-8 = HIGH Risk

## 2023-05-03 NOTE — PROGRESS NOTE ADULT - PROBLEM SELECTOR PLAN 8
F: Fluid restriction.   E: K > 4, Mg > 2.   N: NPO, pending mental status improvement.   DVT px: SCDs due to low hgb.   Dispo: [address] [place] [address] Likely metastatic lesions, on 3 L 02. Wean as tolerated. - c/w home memantine 5mg bid and donepezil 10mh qhs and paroxetine 20mg qd  - monitor for changes in mental status given transaminitis and hyponatremia (plan below)

## 2023-05-03 NOTE — PROGRESS NOTE ADULT - PROBLEM SELECTOR PLAN 5
Elevated transaminases w cholestatic pattern and hyper bilirubinemia mostly direct bili, low albumin, INR 1.94. Negative gaming's sign, abd soft non tender, and she is afebrile higher concern for malignancy.    Results are lateral with cholestatic picture 2/2 pancreatic mass. Do not expect many changes until definitive intervention. Will continue to monitor for now.     Bili up to 14.3 - no pruritus. 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 RESOLVED.   Assume chronic hyponatremia, though mild pretibial edema, does not appear fluid overloaded. 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 AM cortisol and TSH wnl.     Plan:   - Keep fluid restriction to <1L.   - changed medication carrier to NS

## 2023-05-03 NOTE — PROGRESS NOTE ADULT - PROBLEM SELECTOR PLAN 4
RESOLVED.   Assume chronic hyponatremia, though mild pretibial edema, does not appear fluid overloaded. 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 AM cortisol and TSH wnl.     Plan:   - Keep fluid restriction to <1L.   - changed medication carrier to NS Normocytic anemia w hgb 5.7 (down from 9.7 1 month ago on ferrous sulfate PO 325mg daily). S/p 2 units PRBC transfusion with improvement in hemoglobin. LDH and hapto wnl. Borderline iron deficiency noted on lab work. Most recently, received 1 unit pRBC's overnight 05/01 in preparation for advanced endoscopy.     - Maintain active T&S.   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 On EGD performed 4/27, pt was found to have a small, linear Gwendolyn-Ruano tear visualized in the distal esophagus with some liquefied retained gastric contents that were suctioned out. Pt had 1x episode of hematemesis w clots mixed in sputum and old blood in evening of 5/2, CXR unchanged, H&H stable.   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 On EGD performed 4/27, pt was found to have a small, linear Gwendolyn-Ruano tear visualized in the distal esophagus with some liquefied retained gastric contents that were suctioned out. Pt had 1x episode of hematemesis w clots mixed in sputum and old blood in evening of 5/2, CXR unchanged, H&H stable.   Plan:  - Continue to monitor   - F/u with GI recs as above  - Pantoprazole gtt at 10mL/hr started o/n on 5/2. Will continue for now.

## 2023-05-03 NOTE — PRE-ANESTHESIA EVALUATION ADULT - NSANTHPMHFT_GEN_ALL_CORE
Frail 82yo F with HTN and advanced dementia, A&Ox1, limited mobility was admitted for decreased PO intake, possibly abdominal discomfort and jaundice, found to have biliary obstruction. She was also noted to be hyponatremic to 122, elevated LFT and coagulation profile. She has been treated with fluid restriction for hyponatremia and presents to endoscopy for ERCP.   The pt's daughter reports that the pt has had no known cardiac history, was able to do 4METs prior to being so weak
83F (Cantonese speaking) c Dementia, Jaundice, Anemia, HTN, Fatigue, Hemolysis

## 2023-05-03 NOTE — PROGRESS NOTE ADULT - ASSESSMENT
82 y/o Cantonese speaking, history obtained from daughter, F PMH HTN, dementia presents with 1 week of jaundice found to have pancreatic mass s/p EUS and biopsy pending choleocuduodesntosoym. Course c/b leukocytosis to 22k and Tmax 100.9 on 4/28, started on zosyn then switched to meropenem on 4/29. ID consulted for leukocytosis and IV abx duration       Afebrile for 48hr+  Ucx 4/25 contaminated  Blood cx 4/28 NGTD x2  CXR with progressive lung infiltrates bilaterally      Recommendations:   - rising leukocytosis today 21-->27  - afebrile overnight off meropenem  - continue to monitor off antibiotics       Team 1 will continue to follow with you. Thank you for the consult.  84 y/o Cantonese speaking, history obtained from daughter, F PMH HTN, dementia presents with 1 week of jaundice found to have pancreatic mass s/p EUS and biopsy pending choleocuduodesntosoym. Course c/b leukocytosis to 22k and Tmax 100.9 on 4/28, started on zosyn then switched to meropenem on 4/29. ID consulted for leukocytosis and IV abx duration       Afebrile for 48hr+  Ucx 4/25 contaminated  Blood cx 4/28 NGTD x2  CXR with progressive lung infiltrates bilaterally      Recommendations:   - rising leukocytosis today 21-->27  - afebrile overnight off meropenem  - continue to monitor off antibiotics       Team 1 will continue to follow. Thank you for the consult.   Recommendations not final until attending attestation.

## 2023-05-03 NOTE — PROGRESS NOTE ADULT - PROBLEM SELECTOR PLAN 2
Normocytic anemia w hgb 5.7 (down from 9.7 1 month ago on ferrous sulfate PO 325mg daily). S/p 2 units PRBC transfusion with improvement in hemoglobin. LDH and hapto wnl. Borderline iron deficiency noted on lab work. Most recently, received 1 unit pRBC's overnight 05/01 in preparation for advanced endoscopy.     - Maintain active T&S.   - Transfuse <7 See above

## 2023-05-03 NOTE — PRE-ANESTHESIA EVALUATION ADULT - NSANTHPEFT_GEN_ALL_CORE
frail appearing somnolent elderly female with her daughter by the bedside. Jaundiced skin, no edema, rrr
Constitutional: Alert and in no acute distress.  Mouth: Mouth opening within normal limits.   Neck: The appearance of the neck was normal, no neck mass was observed. Thyromental distance within normal limits. Range of motion of neck is within normal limits.  Respiratory: Unlabored breathing.

## 2023-05-03 NOTE — CONSULT NOTE ADULT - PROVIDER SPECIALTY LIST ADULT
Critical Care
Gastroenterology
Surgery
Critical Care
Hospitalist
Infectious Disease
Heme/Onc
Nephrology
Palliative Care

## 2023-05-03 NOTE — CONSULT NOTE ADULT - ATTENDING COMMENTS
Patient seen and d/w Dr. Nj. Agree with plan above.  Hx obtained from dtr at bedside and primary team (Dr. Mathis).  This is a 83F w/ dementia here with 1 week of jaundice found to have a pancreatic mass. Patient is noted to have worsening mentation. However, patient has been tolerating liquid without nausea or vomiting per dtr.   Initial tachycardia now improving with resuscitation.   Lab notable for WBC of 22.91, Hgb of 5.7, MCV 93.7, , INR 1.9, Na 120, Bili 10.8, , , .   Imaging reviewed, notable for a large uncinate mass with biliary dilation and erosion of the duodenal wall. CT chest concerning for pulmonary lesions.  Patient is 83F w/ poor functional status here with biliary obstruction from pancreatic mass, concerning for pancreatic adenocarcinoma. She also has anemia without overt GI bleeding.   Recommendation  -Continue to trend CBC and transfuse to goal  -Trend LT and INR  -Tentative plan for endoscopic evaluation and biliary drainage pending medical optimization  -Agree with GOC
have reviewed course since admission and current data, and have discussed with staff.   agree with findings and recommendations.
New diagnosis of metastatic disease, of pancreatic origin based on imaging with malignant CBD obstruction, hyponatremia and anemia. Imaging with mass erosion with impending perforation and probable microperforation. Broad spectrum abx. Transfuse to Hb > 7. Involve GI for palliative CBD decompression if possible. Monitor asymptomatic hyponatremia for now; workup in progress. Continue GOC discussion. Rest of plan as per above. Admit to med [facility].
Please see progress note for our service
82yo Cantonese speaking F h/o dementia p/w jauntice, fatigue, decreased PO intake, AMS x 1 week. Daughter at bedside denied fever/chills, n/v/d, CP, SOB, cough, but seemed patient was holding stomach c/f intermittent abdominal pain. Upon presentation, VSS, lab notable for WBC 22.35, Hgb 5.6, Plt 307, Cr 0.63, Tbili 10.8, Alkp hos 669, AST//112. CT a/p showed large cystic and solid, partially necrotic lesion in the pancreas, c/f primary adenocarcinoma, which is causing severe diffuse biliary ductal dilatation, eroding portion of duodenum resulting possible duodenal perforation, multiple pulmonary nodules c/f mets. GI, heme/onc, surgery consulted. Likely pancreatic ca with mets, with poor prognosis. Empiric zosyn started on admission. Patient underwent EGD/EUS/ERCP on 4/27 - Gwendolyn-Ruano tear, fungating, ulcerated mass with spontaneous bleeding found, which was invading duodenum. Multiple biopsies taken. Given poor clinical status, heme/onc determined that patient not a chemo candidate and recommended suportive care. Patient spiked 100.9 fever on 4/28, and abx broadened to rissa. Patient is scheduled for choledochoduoenostomy and G-tube placement by GI.  ID was consulted for abx rec for uptrending lukocytosis.  On exam, patient lethargic, non-tender abdomen. LFT stable since admission, WBC 26. BCx 4/28 ngtd.   Suspect leukocytosis due to advanced necrotic cancer. Patient not septic, fever 4/28 likely post-procedural.  Would stop meropenem as it is not helping her and monitor off abx. Can check CRP and procal.    Team 1 will follow you.  Case d/w primary team.    Leida Donato MD, MS  Infectious Disease attending  work cell [phone]   For any questions during evening/weekend/holiday, please page ID on call
Patient initially discussed with Dr. Rudd on 4.27 however patient not seen until today as was off the floor when rounding yesterday for endscopy.  Patient seen today with daughter at bedside who provided history as patient minimal verbal and withdrawn at this time.    Patient is an 83F with recent weight loss, decreased PO intake, and jaundice who presented to ED found to have obstructive pancreatic lesion and elevated bilirubin. Underwent ERCP and brushings yesterday for confirmation of pathology, but clinical presentation is most consistent with at least a locally advanced pancreatic adenocarcinoma and given lung nodules, likely metastatic disease.    Patient is ECOG 3, baseline dementia, and is cachectic on exam.  She is not a candidate for FOLFIRINOX or gemcitabine with nab-paclitaxel. On exam today, no severe localized pain, so no indication for palliative radiation at this time.    Agree with ongoing GI efforts to relieve or circumvent biliary obstruction.  If GOO were to develop, a duodenal stent or venting PEG may have benefit down the road. Palliative care, supportive medications will likely be most beneficial to moderate what will likely be an increasing symptom burden.    I have told the daughter, acknowledging the low likely possibility pathology reveals something other than adenocarcinoma, that this type of malignancy is aggressive and invariably fatal with median prognosis in clinical trials being 6 months, however given patient's performance status, I would anticipate a shorter time frame in this situation.  Daughter stated understanding and all questions answered.

## 2023-05-03 NOTE — PROGRESS NOTE ADULT - ASSESSMENT
This is a 83F with dementia and HTN presents with worsening jaundice and anemia found to have obstructive jaundice from large uncinate mass. S/p endoscopic evaluation with biopsy. Pending endoscopic choledochoduodenostomy and GJ.     - no acute surgical intervention  - f/u GI recs  - Surgery Team 1C will continue to follow. Please page Team 1 with questions/clinical changes. [phone]

## 2023-05-03 NOTE — PROGRESS NOTE ADULT - PROBLEM SELECTOR PLAN 9
- c/w home memantine 5mg bid and donepezil 10mh qhs and paroxetine 20mg qd  - monitor for changes in mental status given transaminitis and hyponatremia (plan below) F: Fluid restriction.   E: K > 4, Mg > 2.   N: NPO, pending mental status improvement.   DVT px: SCDs due to low hgb.   Dispo: [address] [place] [address] F: Fluid restriction.   E: K > 4, Mg > 2.   N: NPO, pending choledochoduodenostomy and endoscopic GJ 5/3   GI: Pantoprazole gtt   DVT px: SCDs due to low hgb.   Dispo:  [facility]

## 2023-05-03 NOTE — PROGRESS NOTE ADULT - PROBLEM SELECTOR PLAN 6
Likely metastatic lesions, on 3 L 02. Wean as tolerated. RESOLVED.   Assume chronic hyponatremia, though mild pretibial edema, does not appear fluid overloaded. 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 AM cortisol and TSH wnl.     Plan:   - Keep fluid restriction to <1L.   - changed medication carrier to NS Elevated transaminases w cholestatic pattern and hyper bilirubinemia mostly direct bili, low albumin, INR 1.94. Negative gaming's sign, abd soft non tender, and she is afebrile higher concern for malignancy. Results are lateral with cholestatic picture 2/2 pancreatic mass. Do not expect many changes until definitive intervention. Bili up to 14.3 - no pruritus.   Plan:   - Total bilirubin 12.1 on 5/3 with AST/ALT of 147/95 respectively   - Continue to monitor, follow GI recs

## 2023-05-03 NOTE — CONSULT NOTE ADULT - ASSESSMENT
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    #ARDS  patient unable to be extubated in endoscopy today  DNR DNI was temporarily rescinded for procedure today  pt also with hx of pulmonary metastases  is likely 2/2 ards after aspiration   -upgrade to micu 2/2 intubation  -discuss with family regarding intubation/extubation plan as pt was DNR DNI prior with hospice planning    #Adenocarcinoma of pancreas.    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 GI and Surgery consultation placed with plans for ERCP with CBD stenting/biopsy for diagnosis. CTAP 4/25: cystic/necrotic lesion in uncinate process of pancreas 6x10cm, CBD dilation 2.4cm, pulmonary nodules suspicious for metastasis.   Patient now s/p EUS-ERCP on 4/27 for biliary stent, unable to place due to mass morphology, choledocoduodenostomy today 5/3.  unable to be extubated - see above problem   Plan:   - c/w GOC  - f/u GI recs  - monitor for post ERCP complications (CBC, pain control/monitoring)   - c/w PPI iv bid   - c/w Reglan 10mg iv q6hr as pro-motility agent     DISPO: MICU     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    #AHRF  patient unable to be extubated in endoscopy today  DNR DNI was temporarily rescinded for procedure today  pt also with hx of pulmonary metastases  is likely 2/2 ards after aspiration   POCUS showing diffuse b lines with small consolidation at R base  -upgrade to micu 2/2 intubation  -discuss with family regarding intubation/extubation plan as pt was DNR DNI prior with hospice planning  -vent settings at this time, VCAC: RR 12 FIO2 70  PEEP 8   -f/u ABG after changes    #Adenocarcinoma of pancreas.    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 GI and Surgery consultation placed with plans for ERCP with CBD stenting/biopsy for diagnosis. CTAP 4/25: cystic/necrotic lesion in uncinate process of pancreas 6x10cm, CBD dilation 2.4cm, pulmonary nodules suspicious for metastasis.   Patient now s/p EUS-ERCP on 4/27 for biliary stent, unable to place due to mass morphology, choledocoduodenostomy today 5/3.  unable to be extubated - see above problem   Plan:   - c/w GOC  - f/u GI recs  - monitor for post ERCP complications (CBC, pain control/monitoring)   - c/w PPI iv bid   - c/w Reglan 10mg iv q6hr as pro-motility agent     DISPO: MICU

## 2023-05-03 NOTE — PROGRESS NOTE ADULT - PROBLEM SELECTOR PLAN 7
- c/w home memantine 5mg bid and donepezil 10mh qhs and paroxetine 20mg qd  - monitor for changes in mental status given transaminitis and hyponatremia (plan below) Elevated transaminases w cholestatic pattern and hyper bilirubinemia mostly direct bili, low albumin, INR 1.94. Negative gaming's sign, abd soft non tender, and she is afebrile higher concern for malignancy.    Results are lateral with cholestatic picture 2/2 pancreatic mass. Do not expect many changes until definitive intervention. Will continue to monitor for now.     Bili up to 14.3 - no pruritus. Likely metastatic lesions, on 3 L 02. Wean as tolerated.

## 2023-05-03 NOTE — CONSULT NOTE ADULT - REASON FOR ADMISSION
Anemia concern for hemolysis, SIRS

## 2023-05-03 NOTE — PROGRESS NOTE ADULT - SUBJECTIVE AND OBJECTIVE BOX
CC: Patient is a 83y old  Female who presents with a chief complaint of Anemia concern for hemolysis, SIRS    INTERVAL EVENTS: ADELAIDA  SUBJECTIVE / INTERVAL HPI: Patient seen and examined at bedside.   ROS: negative unless otherwise stated above.    VITAL SIGNS:  Vital Signs Last 24 Hrs  T(C): 37.1 (03 May 2023 05:19), Max: 37.1 (02 May 2023 12:13)  T(F): 98.7 (03 May 2023 05:19), Max: 98.8 (02 May 2023 12:13)  HR: 98 (03 May 2023 05:19) (96 - 99)  BP: 133/85 (03 May 2023 05:19) (127/84 - 151/92)  BP(mean): --  RR: 18 (03 May 2023 06:54) (18 - 18)  SpO2: 96% (03 May 2023 06:54) (90% - 96%)    Parameters below as of 03 May 2023 06:54  Patient On (Oxygen Delivery Method): nasal cannula  O2 Flow (L/min): 3          PHYSICAL EXAM:    General: NAD  HEENT: MMM  Neck: supple  Cardiovascular: +S1/S2; RRR  Respiratory: CTA B/L; no W/R/R  Gastrointestinal: soft, NT/ND  Extremities: WWP; no edema, clubbing or cyanosis  Vascular: 2+ radial, DP/PT pulses B/L  Neurological: AAOx3; no focal deficits    MEDICATIONS:  MEDICATIONS  (STANDING):  pantoprazole Infusion 8 mG/Hr (10 mL/Hr) IV Continuous <Continuous>    MEDICATIONS  (PRN):  ondansetron Injectable 4 milliGRAM(s) IV Push every 8 hours PRN Nausea and/or Vomiting      ALLERGIES:  Allergies    No Known Allergies    Intolerances        LABS:                        10.3   27.05 )-----------( 264      ( 03 May 2023 05:30 )             32.7     05-03    139  |  104  |  20  ----------------------------<  139<H>  4.3   |  24  |  0.59    Ca    8.5      03 May 2023 05:30  Phos  2.7     05-03  Mg     2.2     05-03    TPro  5.8<L>  /  Alb  2.6<L>  /  TBili  12.1<H>  /  DBili  x   /  AST  147<H>  /  ALT  95<H>  /  AlkPhos  532<H>  05-03    PT/INR - ( 03 May 2023 05:30 )   PT: 13.7 sec;   INR: 1.15          PTT - ( 03 May 2023 05:30 )  PTT:26.5 sec    CAPILLARY BLOOD GLUCOSE      POCT Blood Glucose.: 128 mg/dL (03 May 2023 06:14)      RADIOLOGY & ADDITIONAL TESTS: Reviewed. CC: Patient is a 83y old  Female who presents with a chief complaint of Anemia concern for hemolysis, SIRS    INTERVAL EVENTS: Pt with 1 episode of vomiting o/n on 5/2-5/3, CXR performed showing diffuse bilateral infiltrates, unchanged from previous, with an enlarged cardiomediastinal silhouette and no acute osseous abnormalities. Repeat stat cbc. gave zofran. switched ppi bid to protonix gtt. informed GI.   SUBJECTIVE / INTERVAL HPI: Patient seen and examined at bedside.   ROS: negative unless otherwise stated above.    VITAL SIGNS:  Vital Signs Last 24 Hrs  T(C): 37.1 (03 May 2023 05:19), Max: 37.1 (02 May 2023 12:13)  T(F): 98.7 (03 May 2023 05:19), Max: 98.8 (02 May 2023 12:13)  HR: 98 (03 May 2023 05:19) (96 - 99)  BP: 133/85 (03 May 2023 05:19) (127/84 - 151/92)  BP(mean): --  RR: 18 (03 May 2023 06:54) (18 - 18)  SpO2: 96% (03 May 2023 06:54) (90% - 96%)    Parameters below as of 03 May 2023 06:54  Patient On (Oxygen Delivery Method): nasal cannula  O2 Flow (L/min): 3          PHYSICAL EXAM:    General: NAD  HEENT: MMM  Neck: supple  Cardiovascular: +S1/S2; RRR  Respiratory: CTA B/L; no W/R/R  Gastrointestinal: soft, NT/ND  Extremities: WWP; no edema, clubbing or cyanosis  Vascular: 2+ radial, DP/PT pulses B/L  Neurological: AAOx3; no focal deficits    MEDICATIONS:  MEDICATIONS  (STANDING):  pantoprazole Infusion 8 mG/Hr (10 mL/Hr) IV Continuous <Continuous>    MEDICATIONS  (PRN):  ondansetron Injectable 4 milliGRAM(s) IV Push every 8 hours PRN Nausea and/or Vomiting      ALLERGIES:  Allergies    No Known Allergies    Intolerances        LABS:                        10.3   27.05 )-----------( 264      ( 03 May 2023 05:30 )             32.7     05-03    139  |  104  |  20  ----------------------------<  139<H>  4.3   |  24  |  0.59    Ca    8.5      03 May 2023 05:30  Phos  2.7     05-03  Mg     2.2     05-03    TPro  5.8<L>  /  Alb  2.6<L>  /  TBili  12.1<H>  /  DBili  x   /  AST  147<H>  /  ALT  95<H>  /  AlkPhos  532<H>  05-03    PT/INR - ( 03 May 2023 05:30 )   PT: 13.7 sec;   INR: 1.15          PTT - ( 03 May 2023 05:30 )  PTT:26.5 sec    CAPILLARY BLOOD GLUCOSE      POCT Blood Glucose.: 128 mg/dL (03 May 2023 06:14)      RADIOLOGY & ADDITIONAL TESTS: Reviewed. CC: Patient is a 83y old  Female who presents with a chief complaint of Anemia concern for hemolysis, SIRS    INTERVAL EVENTS: Pt with 1 episode of vomiting o/n on 5/2-5/3, CXR performed showing diffuse bilateral infiltrates, unchanged from previous, with an enlarged cardiomediastinal silhouette and no acute osseous abnormalities. Repeat stat cbc. gave zofran. switched ppi bid to protonix gtt. informed GI.   SUBJECTIVE / INTERVAL HPI: Patient seen and examined at bedside.   ROS: negative unless otherwise stated above.    VITAL SIGNS:  Vital Signs Last 24 Hrs  T(C): 37.1 (03 May 2023 05:19), Max: 37.1 (02 May 2023 12:13)  T(F): 98.7 (03 May 2023 05:19), Max: 98.8 (02 May 2023 12:13)  HR: 98 (03 May 2023 05:19) (96 - 99)  BP: 133/85 (03 May 2023 05:19) (127/84 - 151/92)  BP(mean): --  RR: 18 (03 May 2023 06:54) (18 - 18)  SpO2: 96% (03 May 2023 06:54) (90% - 96%)    Parameters below as of 03 May 2023 06:54  Patient On (Oxygen Delivery Method): nasal cannula  O2 Flow (L/min): 3          PHYSICAL EXAM:  General: Alert and oriented x 1-2. No acute distress. + Jaundiced on scalp, face, and throughout body.   Eyes: Eyes closed during examination, would not open when asked with daughter at bedside.   HEENT: Dry mucous membranes. No scleral icterus. No cervical lymphadenopathy.  Lungs: Clear to auscultation bilaterally. No accessory muscle use.  Cardiovascular: Regular rate and rhythm. No murmur. No JVD.  Abdomen: Soft, non-distended, mild grimacing with palpation. No palpable masses.  Extremities: No edema. Non-tender.  Skin: No rashes or lesions. Warm.  Neurologic: No focal neurological deficits. CN II-XII grossly intact, but not individually tested.  Psychiatric: Not speaking, eyes closed on examination.       MEDICATIONS:  MEDICATIONS  (STANDING):  pantoprazole Infusion 8 mG/Hr (10 mL/Hr) IV Continuous <Continuous>    MEDICATIONS  (PRN):  ondansetron Injectable 4 milliGRAM(s) IV Push every 8 hours PRN Nausea and/or Vomiting      ALLERGIES:  Allergies    No Known Allergies    Intolerances        LABS:                        10.3   27.05 )-----------( 264      ( 03 May 2023 05:30 )             32.7     05-03    139  |  104  |  20  ----------------------------<  139<H>  4.3   |  24  |  0.59    Ca    8.5      03 May 2023 05:30  Phos  2.7     05-03  Mg     2.2     05-03    TPro  5.8<L>  /  Alb  2.6<L>  /  TBili  12.1<H>  /  DBili  x   /  AST  147<H>  /  ALT  95<H>  /  AlkPhos  532<H>  05-03    PT/INR - ( 03 May 2023 05:30 )   PT: 13.7 sec;   INR: 1.15          PTT - ( 03 May 2023 05:30 )  PTT:26.5 sec    CAPILLARY BLOOD GLUCOSE      POCT Blood Glucose.: 128 mg/dL (03 May 2023 06:14)      RADIOLOGY & ADDITIONAL TESTS: Reviewed. CC: Patient is a 83y old  Female who presents with a chief complaint of Anemia concern for hemolysis, SIRS    Stepup Note from [facility] to M-ICU:   83 year old female Cantonese speaking, history obtained from daughter, PMH HTN, dementia presents with 1 week of painless jaundice and fatigue. She originally presented to her out patient GI, Dr. Aguero, and he referred her to our ED due to jaundice. In the ED, patient found to have Hb 5.6, Na 120, T bili 11.8 (direct), Alk Phos 669, , . 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 Admitted to [facility] for severe hyponatremia and symptomatic anemia, pt received 2 u PRBCs. Now Na self-corrected to 127 with fluid restriction only, Hb improved to 8.8. GI consulted, plan for EUS-ERCP for CBD stent placement which was unsuccessful with plans to undergo choledocoduodenostomy with advanced endoscopy today - for which she received 1U pRCB's overnight on 05/01 for surgical optimization. From ID standpoint, patient febrile on Friday 4/28, was on Vanc and Meropenem. ID consulted for rising WBC on Meropenem, though no more fevers, Ab were discontinued. On evening of 5/3, pt went for choledocoduodenostomy with advanced endoscopy. During procedure, DNR/DNI order rescinded, pt found to have gastric contents suggestive of aspiration event and was unable to be extubated. Pt is now pending upgrade to M-ICU.     INTERVAL EVENTS: Pt with 1 episode of vomiting o/n on 5/2-5/3, CXR performed showing diffuse bilateral infiltrates, unchanged from previous, with an enlarged cardiomediastinal silhouette and no acute osseous abnormalities. Repeat stat cbc. gave zofran. switched ppi bid to protonix gtt. informed GI.   SUBJECTIVE / INTERVAL HPI: Patient seen and examined at bedside.   ROS: negative unless otherwise stated above.    VITAL SIGNS:  Vital Signs Last 24 Hrs  T(C): 37.1 (03 May 2023 05:19), Max: 37.1 (02 May 2023 12:13)  T(F): 98.7 (03 May 2023 05:19), Max: 98.8 (02 May 2023 12:13)  HR: 98 (03 May 2023 05:19) (96 - 99)  BP: 133/85 (03 May 2023 05:19) (127/84 - 151/92)  BP(mean): --  RR: 18 (03 May 2023 06:54) (18 - 18)  SpO2: 96% (03 May 2023 06:54) (90% - 96%)    Parameters below as of 03 May 2023 06:54  Patient On (Oxygen Delivery Method): nasal cannula  O2 Flow (L/min): 3          PHYSICAL EXAM:  General: Alert and oriented x 1-2. No acute distress. + Jaundiced on scalp, face, and throughout body.   Eyes: Eyes closed during examination, would not open when asked with daughter at bedside.   HEENT: Dry mucous membranes. No scleral icterus. No cervical lymphadenopathy.  Lungs: Clear to auscultation bilaterally. No accessory muscle use.  Cardiovascular: Regular rate and rhythm. No murmur. No JVD.  Abdomen: Soft, non-distended, mild grimacing with palpation. No palpable masses.  Extremities: No edema. Non-tender.  Skin: No rashes or lesions. Warm.  Neurologic: No focal neurological deficits. CN II-XII grossly intact, but not individually tested.  Psychiatric: Not speaking, eyes closed on examination.       MEDICATIONS:  MEDICATIONS  (STANDING):  pantoprazole Infusion 8 mG/Hr (10 mL/Hr) IV Continuous <Continuous>    MEDICATIONS  (PRN):  ondansetron Injectable 4 milliGRAM(s) IV Push every 8 hours PRN Nausea and/or Vomiting      ALLERGIES:  Allergies    No Known Allergies    Intolerances        LABS:                        10.3   27.05 )-----------( 264      ( 03 May 2023 05:30 )             32.7     05-03    139  |  104  |  20  ----------------------------<  139<H>  4.3   |  24  |  0.59    Ca    8.5      03 May 2023 05:30  Phos  2.7     05-03  Mg     2.2     05-03    TPro  5.8<L>  /  Alb  2.6<L>  /  TBili  12.1<H>  /  DBili  x   /  AST  147<H>  /  ALT  95<H>  /  AlkPhos  532<H>  05-03    PT/INR - ( 03 May 2023 05:30 )   PT: 13.7 sec;   INR: 1.15          PTT - ( 03 May 2023 05:30 )  PTT:26.5 sec    CAPILLARY BLOOD GLUCOSE      POCT Blood Glucose.: 128 mg/dL (03 May 2023 06:14)      RADIOLOGY & ADDITIONAL TESTS: Reviewed. CC: Patient is a 83y old  Female who presents with a chief complaint of Anemia concern for hemolysis, SIRS    Stepup Note from [facility] to M-ICU:   83 year old female Cantonese speaking, history obtained from daughter, PMH HTN, dementia presents with 1 week of painless jaundice and fatigue. She originally presented to her out patient GI, Dr. Aguero, and he referred her to our ED due to jaundice. In the ED, patient found to have Hb 5.6, Na 120, T bili 11.8 (direct), Alk Phos 669, , . 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 Admitted to [facility] for severe hyponatremia and symptomatic anemia, pt received 2 u PRBCs. Now Na self-corrected to 127 with fluid restriction only, Hb improved to 8.8. GI consulted, plan for EUS-ERCP for CBD stent placement which was unsuccessful with plans to undergo choledocoduodenostomy with advanced endoscopy today - for which she received 1U pRCB's overnight on 05/01 for surgical optimization. From ID standpoint, patient febrile on Friday 4/28, was on Vanc and Meropenem. ID consulted for rising WBC on Meropenem, though no more fevers, Ab were discontinued. On evening of 5/3, pt went for choledocoduodenostomy with advanced endoscopy. During procedure, DNR/DNI order temporarily rescinded, pt found to have gastric contents suggestive of aspiration event and was unable to be extubated, presumably from ARDS. Pt is now pending upgrade to M-ICU.     INTERVAL EVENTS: Pt with 1 episode of vomiting o/n on 5/2-5/3, CXR performed showing diffuse bilateral infiltrates, unchanged from previous, with an enlarged cardiomediastinal silhouette and no acute osseous abnormalities. Repeat stat cbc. gave zofran. switched ppi bid to protonix gtt. informed GI.   SUBJECTIVE / INTERVAL HPI: Patient seen and examined at bedside.   ROS: negative unless otherwise stated above.    VITAL SIGNS:  Vital Signs Last 24 Hrs  T(C): 37.1 (03 May 2023 05:19), Max: 37.1 (02 May 2023 12:13)  T(F): 98.7 (03 May 2023 05:19), Max: 98.8 (02 May 2023 12:13)  HR: 98 (03 May 2023 05:19) (96 - 99)  BP: 133/85 (03 May 2023 05:19) (127/84 - 151/92)  BP(mean): --  RR: 18 (03 May 2023 06:54) (18 - 18)  SpO2: 96% (03 May 2023 06:54) (90% - 96%)    Parameters below as of 03 May 2023 06:54  Patient On (Oxygen Delivery Method): nasal cannula  O2 Flow (L/min): 3          PHYSICAL EXAM:  General: Alert and oriented x 1-2. No acute distress. + Jaundiced on scalp, face, and throughout body.   Eyes: Eyes closed during examination, would not open when asked with daughter at bedside.   HEENT: Dry mucous membranes. No scleral icterus. No cervical lymphadenopathy.  Lungs: Clear to auscultation bilaterally. No accessory muscle use.  Cardiovascular: Regular rate and rhythm. No murmur. No JVD.  Abdomen: Soft, non-distended, mild grimacing with palpation. No palpable masses.  Extremities: No edema. Non-tender.  Skin: No rashes or lesions. Warm.  Neurologic: No focal neurological deficits. CN II-XII grossly intact, but not individually tested.  Psychiatric: Not speaking, eyes closed on examination.       MEDICATIONS:  MEDICATIONS  (STANDING):  pantoprazole Infusion 8 mG/Hr (10 mL/Hr) IV Continuous <Continuous>    MEDICATIONS  (PRN):  ondansetron Injectable 4 milliGRAM(s) IV Push every 8 hours PRN Nausea and/or Vomiting      ALLERGIES:  Allergies    No Known Allergies    Intolerances        LABS:                        10.3   27.05 )-----------( 264      ( 03 May 2023 05:30 )             32.7     05-03    139  |  104  |  20  ----------------------------<  139<H>  4.3   |  24  |  0.59    Ca    8.5      03 May 2023 05:30  Phos  2.7     05-03  Mg     2.2     05-03    TPro  5.8<L>  /  Alb  2.6<L>  /  TBili  12.1<H>  /  DBili  x   /  AST  147<H>  /  ALT  95<H>  /  AlkPhos  532<H>  05-03    PT/INR - ( 03 May 2023 05:30 )   PT: 13.7 sec;   INR: 1.15          PTT - ( 03 May 2023 05:30 )  PTT:26.5 sec    CAPILLARY BLOOD GLUCOSE      POCT Blood Glucose.: 128 mg/dL (03 May 2023 06:14)      RADIOLOGY & ADDITIONAL TESTS: Reviewed. CC: Patient is a 83y old  Female who presents with a chief complaint of Anemia concern for hemolysis, SIRS    Stepup Note from [facility] to M-ICU:   83 year old female Cantonese speaking, history obtained from daughter, PMH HTN, dementia presents with 1 week of painless jaundice and fatigue. She originally presented to her out patient GI, Dr. Aguero, and he referred her to our ED due to jaundice. In the ED, patient found to have Hb 5.6, Na 120, T bili 11.8 (direct), Alk Phos 669, , . 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 Admitted to [facility] for severe hyponatremia and symptomatic anemia, pt received 2 u PRBCs. Now Na self-corrected to 127 with fluid restriction only, Hb improved to 8.8. GI consulted, plan for EUS-ERCP for CBD stent placement which was unsuccessful with plans to undergo choledocoduodenostomy with advanced endoscopy today - for which she received 1U pRCB's overnight on 05/01 for surgical optimization. From ID standpoint, patient febrile on Friday 4/28, was on Vanc and Meropenem. ID consulted for rising WBC on Meropenem, though no more fevers, Ab were discontinued. On evening of 5/3, pt went for choledocoduodenostomy with advanced endoscopy. During procedure, DNR/DNI order temporarily rescinded, pt found to have profuse gastric contents and was unable to be extubated. Pt is now pending upgrade to M-ICU.     INTERVAL EVENTS: Pt with 1 episode of vomiting o/n on 5/2-5/3, CXR performed showing diffuse bilateral infiltrates, unchanged from previous, with an enlarged cardiomediastinal silhouette and no acute osseous abnormalities. Repeat stat cbc. gave zofran. switched ppi bid to protonix gtt. informed GI.   SUBJECTIVE / INTERVAL HPI: Patient seen and examined at bedside.   ROS: negative unless otherwise stated above.    VITAL SIGNS:  Vital Signs Last 24 Hrs  T(C): 37.1 (03 May 2023 05:19), Max: 37.1 (02 May 2023 12:13)  T(F): 98.7 (03 May 2023 05:19), Max: 98.8 (02 May 2023 12:13)  HR: 98 (03 May 2023 05:19) (96 - 99)  BP: 133/85 (03 May 2023 05:19) (127/84 - 151/92)  BP(mean): --  RR: 18 (03 May 2023 06:54) (18 - 18)  SpO2: 96% (03 May 2023 06:54) (90% - 96%)    Parameters below as of 03 May 2023 06:54  Patient On (Oxygen Delivery Method): nasal cannula  O2 Flow (L/min): 3          PHYSICAL EXAM:  General: Alert and oriented x 1-2. No acute distress. + Jaundiced on scalp, face, and throughout body.   Eyes: Eyes closed during examination, would not open when asked with daughter at bedside.   HEENT: Dry mucous membranes. No scleral icterus. No cervical lymphadenopathy.  Lungs: Clear to auscultation bilaterally. No accessory muscle use.  Cardiovascular: Regular rate and rhythm. No murmur. No JVD.  Abdomen: Soft, non-distended, mild grimacing with palpation. No palpable masses.  Extremities: No edema. Non-tender.  Skin: No rashes or lesions. Warm.  Neurologic: No focal neurological deficits. CN II-XII grossly intact, but not individually tested.  Psychiatric: Not speaking, eyes closed on examination.       MEDICATIONS:  MEDICATIONS  (STANDING):  pantoprazole Infusion 8 mG/Hr (10 mL/Hr) IV Continuous <Continuous>    MEDICATIONS  (PRN):  ondansetron Injectable 4 milliGRAM(s) IV Push every 8 hours PRN Nausea and/or Vomiting      ALLERGIES:  Allergies    No Known Allergies    Intolerances        LABS:                        10.3   27.05 )-----------( 264      ( 03 May 2023 05:30 )             32.7     05-03    139  |  104  |  20  ----------------------------<  139<H>  4.3   |  24  |  0.59    Ca    8.5      03 May 2023 05:30  Phos  2.7     05-03  Mg     2.2     05-03    TPro  5.8<L>  /  Alb  2.6<L>  /  TBili  12.1<H>  /  DBili  x   /  AST  147<H>  /  ALT  95<H>  /  AlkPhos  532<H>  05-03    PT/INR - ( 03 May 2023 05:30 )   PT: 13.7 sec;   INR: 1.15          PTT - ( 03 May 2023 05:30 )  PTT:26.5 sec    CAPILLARY BLOOD GLUCOSE      POCT Blood Glucose.: 128 mg/dL (03 May 2023 06:14)      RADIOLOGY & ADDITIONAL TESTS: Reviewed.

## 2023-05-03 NOTE — PROGRESS NOTE ADULT - ATTENDING COMMENTS
#Leukocytosis, metastatic pancreatic cancer    Patient more awake, reports abdominal pain, denied fever/chills, n/v/d.  Exam with mildly tender abdomen.  WBC up to 27, Cr 0.4, Tbili 12, transaminitis to 147/90.  Patient remains stable and not septic off abx.  Suspect leukocytosis due to advanced necrotic cancer.  She is s/p empiric broad spectrum abx for 7 days.  Patient is planned for procedure today.  If patient becomes febrile after procedure, then restart zosyn 3.375g IV q8h over 4h.       Team 1 will follow you.  Case d/w primary team.    Leida Donato MD, MS  Infectious Disease attending  work cell [phone]   For any questions during evening/weekend/holiday, please page ID on call

## 2023-05-03 NOTE — PROGRESS NOTE ADULT - PROBLEM SELECTOR PLAN 1
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 GI and Surgery consultation placed with plans for ERCP with CBD stenting/biopsy for diagnosis.   CTAP 4/25: cystic/necrotic lesion in uncinate process of pancreas 6x10cm, CBD dilation 2.4cm, pulmonary nodules suspicious for metastasis.   Patient now s/p EUS-ERCP on 4/27 for biliary stent, unable to place due to mass morphology, plan for choledocoduodenostomy today.     Plan:   - advanced cholodocoduodenostomy today for biliary obstruction relief  - further GOC conversations for outpatient planning upon discharge   - monitor for post ERCP complications (CBC, pain control/monitoring)   - Patient is DNR/DNI (Living will scanned into chart) Pt presenting w painless jaundice, found to have large pancreatic mass suspicious for adenocarcinoma. GI and Surgery consultation placed with plans for ERCP with CBD stenting/biopsy for diagnosis. CTAP 4/25: cystic/necrotic lesion in uncinate process of pancreas 6x10cm, CBD dilation 2.4cm, pulmonary nodules suspicious for metastasis.   Patient now s/p EUS-ERCP on 4/27 for biliary stent, unable to place due to mass morphology, plan for holedocoduodenostomy today 5/3.   Plan:   - advanced cholodocoduodenostomy today for biliary obstruction relief  - further GOC conversations for outpatient planning upon discharge   - monitor for post ERCP complications (CBC, pain control/monitoring)   - Patient is DNR/DNI (Living will scanned into chart) Pt presenting w painless jaundice, found to have large pancreatic mass suspicious for adenocarcinoma. GI and Surgery consultation placed with plans for ERCP with CBD stenting/biopsy for diagnosis. CTAP 4/25: cystic/necrotic lesion in uncinate process of pancreas 6x10cm, CBD dilation 2.4cm, pulmonary nodules suspicious for metastasis.   Patient now s/p EUS-ERCP on 4/27 for biliary stent, unable to place due to mass morphology, plan for holedocoduodenostomy today 5/3.   Plan:   - advanced choledochoduodenostomy and endoscopic GJ today for biliary obstruction relief  - further GOC conversations for outpatient planning upon discharge   - monitor for post ERCP complications (CBC, pain control/monitoring)   - Patient is DNR/DNI (Living will scanned into chart)

## 2023-05-03 NOTE — PROGRESS NOTE ADULT - SUBJECTIVE AND OBJECTIVE BOX
SUBJECTIVE: Patient seen and evaluated.        MEDICATIONS  (STANDING):  pantoprazole Infusion 8 mG/Hr (10 mL/Hr) IV Continuous <Continuous>    MEDICATIONS  (PRN):  ondansetron Injectable 4 milliGRAM(s) IV Push every 8 hours PRN Nausea and/or Vomiting      Vital Signs Last 24 Hrs  T(C): 36.9 (03 May 2023 11:36), Max: 37.1 (02 May 2023 12:13)  T(F): 98.5 (03 May 2023 11:36), Max: 98.8 (02 May 2023 12:13)  HR: 101 (03 May 2023 11:36) (96 - 101)  BP: 132/83 (03 May 2023 11:36) (127/84 - 151/92)  BP(mean): --  RR: 20 (03 May 2023 11:36) (18 - 20)  SpO2: 96% (03 May 2023 11:36) (90% - 96%)    Parameters below as of 03 May 2023 11:36  Patient On (Oxygen Delivery Method): nasal cannula  O2 Flow (L/min): 3      Physical Exam:  General: NAD, resting comfortably in bed, jaundice  Pulmonary: Nonlabored breathing, no respiratory distress  Cardiovascular: NSR  Abdominal: soft, mildly distended, no tenderness no rebound or guarding    I&O's Summary      LABS:                        10.3   27.05 )-----------( 264      ( 03 May 2023 05:30 )             32.7     05-03    139  |  104  |  20  ----------------------------<  139<H>  4.3   |  24  |  0.59    Ca    8.5      03 May 2023 05:30  Phos  2.7     05-03  Mg     2.2     05-03    TPro  5.8<L>  /  Alb  2.6<L>  /  TBili  12.1<H>  /  DBili  x   /  AST  147<H>  /  ALT  95<H>  /  AlkPhos  532<H>  05-03    PT/INR - ( 03 May 2023 05:30 )   PT: 13.7 sec;   INR: 1.15          PTT - ( 03 May 2023 05:30 )  PTT:26.5 sec    CAPILLARY BLOOD GLUCOSE      POCT Blood Glucose.: 128 mg/dL (03 May 2023 06:14)  POCT Blood Glucose.: 125 mg/dL (03 May 2023 00:14)  POCT Blood Glucose.: 129 mg/dL (02 May 2023 17:28)  POCT Blood Glucose.: 124 mg/dL (02 May 2023 12:22)    LIVER FUNCTIONS - ( 03 May 2023 05:30 )  Alb: 2.6 g/dL / Pro: 5.8 g/dL / ALK PHOS: 532 U/L / ALT: 95 U/L / AST: 147 U/L / GGT: x             RADIOLOGY & ADDITIONAL STUDIES:

## 2023-05-04 NOTE — PROGRESS NOTE ADULT - ATTENDING COMMENTS
Dementia, HTN, pancreatic cancer with obstructive jaundice and now AHRF with aspiration pneumonia after choledochoduodenostomy and gastric stenting for outlet obstruction  physical as above  TV 6 ml/kg, now 30% FiO2 to SaO2 over 90%  follow off antibiotics  Goal RASS 0 for now  slow to awaken for CPAP trial; the CXR is unchanged from baseline

## 2023-05-04 NOTE — PROGRESS NOTE ADULT - SUBJECTIVE AND OBJECTIVE BOX
OPAL FOSS , [id number],  [facility] [id number]    Time of encounter : 1:50 pm    remain intubated on SBT/CPAP, not responding to stimulus yet, she resist attempt to open eyes.  hemodynamically stable.       T(C): 37.8 (05-04-23 @ 10:50), Max: 37.8 (05-04-23 @ 10:50)  HR: 99 (05-04-23 @ 13:00) (79 - 109)  BP: 120/66 (05-04-23 @ 10:50) (88/51 - 132/83)  RR: 17 (05-04-23 @ 13:00) (14 - 23)  SpO2: 95% (05-04-23 @ 13:00) (92% - 100%)    chlorhexidine 0.12% Liquid 15 milliLiter(s) Oral Mucosa every 12 hours  chlorhexidine 2% Cloths 1 Application(s) Topical <User Schedule>  dexMEDEtomidine Infusion 0.1 MICROgram(s)/kG/Hr IV Continuous <Continuous>  metoclopramide Injectable 10 milliGRAM(s) IV Push every 6 hours  ondansetron Injectable 4 milliGRAM(s) IV Push every 8 hours PRN  pantoprazole  Injectable 40 milliGRAM(s) IV Push every 12 hours  propofol Infusion 35 MICROgram(s)/kG/Min IV Continuous <Continuous>      Physical Exam :    General exam : intubated, resist eyes opening.  RRR  good air entry bilaterally  bs present, soft, nt ,nd  ext- no edema   neuro -not able to access.     CBC Full  -  ( 04 May 2023 05:18 )  WBC Count : 28.54 K/uL  RBC Count : 2.79 M/uL  Hemoglobin : 8.6 g/dL  Hematocrit : 27.4 %  Platelet Count - Automated : 237 K/uL  Mean Cell Volume : 98.2 fl  Mean Cell Hemoglobin : 30.8 pg  Mean Cell Hemoglobin Concentration : 31.4 gm/dL  Auto Neutrophil # : 25.44 K/uL  Auto Lymphocyte # : 1.59 K/uL  Auto Monocyte # : 0.92 K/uL  Auto Eosinophil # : 0.19 K/uL  Auto Basophil # : 0.08 K/uL  Auto Neutrophil % : 89.1 %  Auto Lymphocyte % : 5.6 %  Auto Monocyte % : 3.2 %  Auto Eosinophil % : 0.7 %  Auto Basophil % : 0.3 %        05-04    136  |  104  |  28<H>  ----------------------------<  131<H>  4.3   |  25  |  0.68    Ca    8.1<L>      04 May 2023 05:18  Phos  2.7     05-04  Mg     2.1     05-04    TPro  5.0<L>  /  Alb  2.4<L>  /  TBili  10.6<H>  /  DBili  x   /  AST  133<H>  /  ALT  79<H>  /  AlkPhos  406<H>  05-04    Daily Height in cm: 152.4 (03 May 2023 16:26)    Daily   CAPILLARY BLOOD GLUCOSE      POCT Blood Glucose.: 117 mg/dL (04 May 2023 11:35)          PT/INR - ( 03 May 2023 05:30 )   PT: 13.7 sec;   INR: 1.15          PTT - ( 03 May 2023 05:30 )  PTT:26.5 sec

## 2023-05-04 NOTE — PROVIDER CONTACT NOTE (OTHER) - SITUATION
Pt SBP dipped below 90 & sustained 30 seconds of SVT w/ HR in the 140s
pt SBP below 90 & MAP dipping below 65

## 2023-05-04 NOTE — PROGRESS NOTE ADULT - SUBJECTIVE AND OBJECTIVE BOX
*** [facility] ****    Hospital Course:    Overnight Events:    Subjective: Patient seen and examined at bedside.    [OBJECTIVE]:    Vital Signs:  T(F): , Max: 98.7 (05-03-23 @ 05:19)  HR:  (79 - 101)  BP:  (88/51 - 133/85)  BP(mean):  (65 - 90)  RR:  (14 - 20)  SpO2:  (90% - 100%)  Wt(kg): --  CVP(cm H2O): --  Mode: AC/ CMV (Assist Control/ Continuous Mandatory Ventilation), RR (machine): 12, RR (patient): 20, TV (machine): 270, FiO2: 35, PEEP: 8, ITime: 1, MAP: 11, PC: 25, PIP: 17    05-03 @ 07:01  -  05-04 @ 01:09  --------------------------------------------------------  IN: 23.2 mL / OUT: 0 mL / NET: 23.2 mL      CAPILLARY BLOOD GLUCOSE      POCT Blood Glucose.: 134 mg/dL (03 May 2023 12:31)      Physcial Exam:  T(F): 97.7 (05-03-23 @ 22:11)  HR: 97 (05-04-23 @ 00:15)  BP: 93/55 (05-04-23 @ 00:15)  RR: 18 (05-04-23 @ 00:15)  SpO2: 99% (05-04-23 @ 00:15)  Wt(kg): --    General: AO x 3, NAD, Comfortable, Pleasant, Anxious, Agitated, Ill, Frail, Cachectic, Resp distress  HEENT: PERRL/ EOMI, no scleral icterus, no ptosis, MMM, no JVD, no thyromegaly  Respiratory: CTA b/l, no wheezes, rales or rhonchi  Cardiovascular: Regular, +S1 + S2  Abdomen: Soft, NTND, normoactive bowel sounds, no rebound, no guarding, no suprapubic tenderness  Extremities: No cyanosis, no clubbing, no edema, pulses equal, no calf tenderness  Skin: No rashes  Lymphatic: No cervical/supraclavicular LAD  Neurological: CN II-XII grossly intact, follows commands, moves all extremities    Medications:  MEDICATIONS  (STANDING):  chlorhexidine 0.12% Liquid 15 milliLiter(s) Oral Mucosa every 12 hours  fentaNYL   Infusion. 0.5 MICROgram(s)/kG/Hr (2.25 mL/Hr) IV Continuous <Continuous>  pantoprazole  Injectable 40 milliGRAM(s) IV Push every 12 hours  propofol Infusion 35 MICROgram(s)/kG/Min (9.43 mL/Hr) IV Continuous <Continuous>    MEDICATIONS  (PRN):  ondansetron Injectable 4 milliGRAM(s) IV Push every 8 hours PRN Nausea and/or Vomiting      Allergies:  Allergies    No Known Allergies    Intolerances        Labs:                        9.0    26.34 )-----------( 232      ( 03 May 2023 20:40 )             27.8     05-03    134<L>  |  103  |  26<H>  ----------------------------<  121<H>  4.0   |  24  |  0.67    Ca    8.3<L>      03 May 2023 20:40  Phos  2.7     05-03  Mg     2.2     05-03    TPro  5.1<L>  /  Alb  2.3<L>  /  TBili  11.7<H>  /  DBili  x   /  AST  138<H>  /  ALT  82<H>  /  AlkPhos  431<H>  05-03    PT/INR - ( 03 May 2023 05:30 )   PT: 13.7 sec;   INR: 1.15          PTT - ( 03 May 2023 05:30 )  PTT:26.5 sec      Radiology and other tests:   *** Transfer from [facility] to MICU ****    Hospital Course:  83 year old female Cantonese speaking, history obtained from daughter, PMH HTN, dementia presents with 1 week of painless jaundice and fatigue. She originally presented to her out patient GI, Dr. Aguero, and he referred her to our ED due to jaundice. In the ED, patient found to have Hb 5.6, Na 120, T bili 11.8 (direct), Alk Phos 669, , . 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 Admitted to [facility] for severe hyponatremia and symptomatic anemia, pt received 2 u PRBCs. Now Na self-corrected to 127 with fluid restriction only, Hb improved to 8.8. GI consulted, plan for EUS-ERCP for CBD stent placement which was unsuccessful with plans to undergo choledocoduodenostomy with advanced endoscopy today - for which she received 1U pRCB's overnight on 05/01 for surgical optimization. From ID standpoint, patient febrile on Friday 4/28, was on Vanc and Meropenem. ID consulted for rising WBC on Meropenem, though no more fevers, Ab were discontinued. On evening of 5/3, pt went for choledocoduodenostomy with advanced endoscopy. During procedure, DNR/DNI order temporarily rescinded, pt found to have profuse gastric contents and was unable to be extubated, transferred to MICU post procedure.    Subjective: Patient seen and examined at bedside. Pt remains intubated and sedated. Does not appear to be in any distress.    [OBJECTIVE]:    Vital Signs:  T(F): , Max: 98.7 (05-03-23 @ 05:19)  HR:  (79 - 101)  BP:  (88/51 - 133/85)  BP(mean):  (65 - 90)  RR:  (14 - 20)  SpO2:  (90% - 100%)  Wt(kg): --  CVP(cm H2O): --  Mode: AC/ CMV (Assist Control/ Continuous Mandatory Ventilation), RR (machine): 12, RR (patient): 20, TV (machine): 270, FiO2: 35, PEEP: 8, ITime: 1, MAP: 11, PC: 25, PIP: 17    05-03 @ 07:01  -  05-04 @ 01:09  --------------------------------------------------------  IN: 23.2 mL / OUT: 0 mL / NET: 23.2 mL      CAPILLARY BLOOD GLUCOSE      POCT Blood Glucose.: 134 mg/dL (03 May 2023 12:31)      Physcial Exam:  T(F): 97.7 (05-03-23 @ 22:11)  HR: 97 (05-04-23 @ 00:15)  BP: 93/55 (05-04-23 @ 00:15)  RR: 18 (05-04-23 @ 00:15)  SpO2: 99% (05-04-23 @ 00:15)  Wt(kg): --    General: Sedated. No acute distress. + Jaundiced on scalp, face, and throughout body.   HEENT: Dry mucous membranes. No scleral icterus. No cervical lymphadenopathy.  Lungs: . No accessory muscle use.  Cardiovascular: Regular rate and rhythm. No murmur. No JVD.  Abdomen: Soft, non-distended, mild grimacing with palpation. No palpable masses.  Extremities: No edema. Non-tender.  Skin: No rashes or lesions. Warm.  Neurologic: Unable to assess  Psychiatric: Unable to assess    Medications:  MEDICATIONS  (STANDING):  chlorhexidine 0.12% Liquid 15 milliLiter(s) Oral Mucosa every 12 hours  fentaNYL   Infusion. 0.5 MICROgram(s)/kG/Hr (2.25 mL/Hr) IV Continuous <Continuous>  pantoprazole  Injectable 40 milliGRAM(s) IV Push every 12 hours  propofol Infusion 35 MICROgram(s)/kG/Min (9.43 mL/Hr) IV Continuous <Continuous>    MEDICATIONS  (PRN):  ondansetron Injectable 4 milliGRAM(s) IV Push every 8 hours PRN Nausea and/or Vomiting      Allergies:  Allergies    No Known Allergies    Intolerances        Labs:                        9.0    26.34 )-----------( 232      ( 03 May 2023 20:40 )             27.8     05-03    134<L>  |  103  |  26<H>  ----------------------------<  121<H>  4.0   |  24  |  0.67    Ca    8.3<L>      03 May 2023 20:40  Phos  2.7     05-03  Mg     2.2     05-03    TPro  5.1<L>  /  Alb  2.3<L>  /  TBili  11.7<H>  /  DBili  x   /  AST  138<H>  /  ALT  82<H>  /  AlkPhos  431<H>  05-03    PT/INR - ( 03 May 2023 05:30 )   PT: 13.7 sec;   INR: 1.15          PTT - ( 03 May 2023 05:30 )  PTT:26.5 sec      Radiology and other tests:

## 2023-05-04 NOTE — PROVIDER CONTACT NOTE (OTHER) - ACTION/TREATMENT ORDERED:
500cc LR bolus over 15 minutes per MD order. No further interventions at this time. Will continue to monitor & reassess in 15 minutes.

## 2023-05-04 NOTE — PROGRESS NOTE ADULT - ASSESSMENT
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 s/p attempted biliary stent placement without success, s/p choledochoduodenostomy, unable to be extubated -- stepped up to MICU, currently undergoing CPAP trial for extubation    Neurology:  #Intubated and sedated  s/p fentanyl, precedex     Pulmonary:  #AHRF  patient unable to be extubated in endoscopy s/p proecudre  DNR DNI was temporarily rescinded for procedure today  pt also with hx of pulmonary metastases  is likely 2/2 ards after aspiration   POCUS showing diffuse b lines with small consolidation at R base  -upgrade to micu 2/2 intubation  -discuss with family regarding intubation/extubation plan as pt was DNR DNI prior with hospice planning  -vent settings at this time, VCAC: RR 12 FIO2 35  PEEP 8   - ABG normal    #pulmonary nodules  Suspicious for pulmonary metastasis.    Cardiology:  #hypotension  Likely 2/2 sedation    GI:  #Adenocarcinoma of pancreas.    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 GI and Surgery consultation placed with plans for ERCP with CBD stenting/biopsy for diagnosis. CTAP 4/25: cystic/necrotic lesion in uncinate process of pancreas 6x10cm, CBD dilation 2.4cm, pulmonary nodules suspicious for metastasis.   Patient now s/p EUS-ERCP on 4/27 for biliary stent, unable to place due to mass morphology, choledocoduodenostomy today 5/3.  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   On EGD performed 4/27, pt was found to have a small, linear Gwendolyn-Ruano tear visualized in the distal esophagus with some liquefied retained gastric contents that were suctioned out. Pt had 1x episode of hematemesis w clots mixed in sputum and old blood in evening of 5/2, CXR unchanged, H&H stable.     - Continue to monitor   - F/u with GI recs as above  - Pantoprazole 40mg IVP BID    Renal/:  NIMESH     Endocrine:  NIMESH    ID:  #Leukocytosis  Afebrile for 48hrs+, s/p 7 days of broad spectrum abx (merrem/zosyn). 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 s/p attempted biliary stent placement without success, s/p choledochoduodenostomy, unable to be extubated -- stepped up to MICU, currently undergoing CPAP trial for extubation    Neurology:  #Intubated and sedated  s/p fentanyl, precedex     Pulmonary:  #AHRF  patient transfer to MICU failed extubation trail in endoscopy after procedure (DNR DNI temporarily rescinded for procedure)  pt with hx of pulmonary metastases  POCUS showing diffuse b lines with small consolidation at R base  CXR after procedure unchanged to previous, airspace opacities likely 2/2 to pulmonary mets   -upgrade to micu 2/2 intubation  -vent settings at this time, CPAP: RR 17 FIO2 30  PEEP 5   - ABG normal  - plan for extubation trial today     #pulmonary nodules  Suspicious for pulmonary metastasis.    Cardiology:  #hypotension  Likely 2/2 sedation    GI:  #Adenocarcinoma of pancreas.    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 GI and Surgery consultation placed with plans for ERCP with CBD stenting/biopsy for diagnosis. CTAP 4/25: cystic/necrotic lesion in uncinate process of pancreas 6x10cm, CBD dilation 2.4cm, pulmonary nodules suspicious for metastasis.   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   On EGD performed 4/27, pt was found to have a small, linear Gwendolyn-Ruano tear visualized in the distal esophagus with some liquefied retained gastric contents that were suctioned out. Pt had 1x episode of hematemesis w clots mixed in sputum and old blood in evening of 5/2, CXR unchanged, H&H stable.   - Continue to monitor   - F/u with GI recs as above  - Pantoprazole 40mg IVP BID    Renal/:  NIMESH     Endocrine:  NIMESH    ID:  #Leukocytosis  Afebrile for 48hrs+, s/p 7 days of broad spectrum abx (merrem/zosyn). 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 s/p attempted biliary stent placement without success, s/p choledochoduodenostomy, unable to be extubated -- stepped up to MICU, currently undergoing CPAP trial for extubation    Neurology:  #Intubated and sedated  s/p fentanyl, precedex   - CPAP trial today wean to HFNC iso lung metastases (pt on 2L NC prior to intubation)    Pulmonary:  #AHRF  patient transfer to MICU failed extubation trail in endoscopy after procedure (DNR DNI temporarily rescinded for procedure)  pt with hx of pulmonary metastases  POCUS showing diffuse b lines with small consolidation at R base  CXR after procedure unchanged to previous, airspace opacities likely 2/2 to pulmonary mets   -upgrade to micu 2/2 intubation  -vent settings at this time, CPAP: RR 17 FIO2 30  PEEP 5   - ABG normal  - plan for extubation trial today     #pulmonary nodules  Suspicious for pulmonary metastasis.    Cardiology:  #hypotension  Likely 2/2 sedation    GI:  #Adenocarcinoma of pancreas.    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 GI and Surgery consultation placed with plans for ERCP with CBD stenting/biopsy for diagnosis. CTAP 4/25: cystic/necrotic lesion in uncinate process of pancreas 6x10cm, CBD dilation 2.4cm, pulmonary nodules suspicious for metastasis.   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   On EGD performed 4/27, pt was found to have a small, linear Gwendolyn-Ruano tear visualized in the distal esophagus with some liquefied retained gastric contents that were suctioned out. Pt had 1x episode of hematemesis w clots mixed in sputum and old blood in evening of 5/2, CXR unchanged, H&H stable.   - Continue to monitor   - F/u with GI recs as above  - Pantoprazole 40mg IVP BID    Renal/:  NIMESH     Endocrine:  NIMESH    ID:  #Leukocytosis  Afebrile for 48hrs+, s/p 7 days of broad spectrum abx (merrem/zosyn). Suspect leukocytosis 2/2 advanced necrotic cancer vs reactive post procedure   - ID following  - Monitor off abx, if spikes fever, restart zosyn    FEN: NPO, Replete lytes PRN K>4, Mg>2  PPx: SCDs  Code: DNR/DNI, temporarily rescinded for procedure, pending further GOC discussion  Dispo: MICU

## 2023-05-04 NOTE — PROGRESS NOTE ADULT - ASSESSMENT
84 y/o Cantonese speaking, history obtained from daughter, F PMH HTN, dementia presents with 1 week of jaundice. GI consulted for jaundice.     #Jaundice  #Uncinate process pancreas mass  large heterogeneous necrotic mass with upstream dilation of PD and CBD  Imaging also suggests pulmonary metastases   - d/w daughter at bedside and advised GOC discussion with family  - s/p [id number] with fungating ulcerated nearly circumferential mass in D2, biopsies obtained from periphery of mass and from core under EUS guidance; given inability to visualize papilla, ERCP not performed  - pathology: poorly diff ca with sarcomatoid features  - s/p EGD and EUS-CD and EUS-GJ on 5/3/  - c/w PPI   - Recommendations discussed with primary team  Plan discussed with GI service attending    William Jones MD  PGY-[id number] GI fellow  Pager: [phone]   82 y/o Cantonese speaking, history obtained from daughter, F PMH HTN, dementia presents with 1 week of jaundice. GI consulted for jaundice.     #Jaundice  #Uncinate process pancreas mass  large heterogeneous necrotic mass with upstream dilation of PD and CBD  Imaging also suggests pulmonary metastases   - d/w daughter at bedside and advised GOC discussion with family  - s/p [id number] with fungating ulcerated nearly circumferential mass in D2, biopsies obtained from periphery of mass and from core under EUS guidance; given inability to visualize papilla, ERCP not performed  - pathology: poorly diff ca with sarcomatoid features  - s/p EGD and EUS-CD and EUS-GJ on 5/3/23. Post procedure went to ICU for monitoring  - LTs with slight downtrend, monitor LTs  - c/w PPI   - further care per primary team    Recommendations discussed with primary team  Plan discussed with GI service attending    William Jones MD  PGY-6 GI fellow  Pager: [phone]

## 2023-05-04 NOTE — PROGRESS NOTE ADULT - SUBJECTIVE AND OBJECTIVE BOX
GASTROENTEROLOGY PROGRESS NOTE  Patient seen and examined at bedside in ICU. Was on MV however off sedation and on CPAP, being weakned off the vent. VS stable without pressors.   Abdomen, soft NT    PERTINENT REVIEW OF SYSTEMS:  As noted above    Allergies    No Known Allergies    Intolerances      MEDICATIONS:  MEDICATIONS  (STANDING):  chlorhexidine 0.12% Liquid 15 milliLiter(s) Oral Mucosa every 12 hours  chlorhexidine 2% Cloths 1 Application(s) Topical <User Schedule>  dexMEDEtomidine Infusion 0.1 MICROgram(s)/kG/Hr (1.12 mL/Hr) IV Continuous <Continuous>  metoclopramide Injectable 10 milliGRAM(s) IV Push every 6 hours  pantoprazole  Injectable 40 milliGRAM(s) IV Push every 12 hours  propofol Infusion 35 MICROgram(s)/kG/Min (9.43 mL/Hr) IV Continuous <Continuous>    MEDICATIONS  (PRN):  ondansetron Injectable 4 milliGRAM(s) IV Push every 8 hours PRN Nausea and/or Vomiting    Vital Signs Last 24 Hrs  T(C): 37.7 (04 May 2023 14:36), Max: 37.8 (04 May 2023 10:50)  T(F): 99.9 (04 May 2023 14:36), Max: 100 (04 May 2023 10:50)  HR: 106 (04 May 2023 16:00) (79 - 109)  BP: 102/57 (04 May 2023 16:00) (88/51 - 132/83)  BP(mean): 76 (04 May 2023 16:00) (65 - 90)  RR: 20 (04 May 2023 16:00) (14 - 23)  SpO2: 95% (04 May 2023 16:00) (92% - 100%)    Parameters below as of 04 May 2023 15:00  Patient On (Oxygen Delivery Method): ventilator,CPAP        05-03 @ 07:01  -  05-04 @ 07:00  --------------------------------------------------------  IN: 599.9 mL / OUT: 200 mL / NET: 399.9 mL    05-04 @ 07:01  -  05-04 @ 16:07  --------------------------------------------------------  IN: 10.5 mL / OUT: 50 mL / NET: -39.5 mL      PHYSICAL EXAM:    General: thin built. on MV  HEENT: scleral icterus  Gastrointestinal: Soft non-tender non-distended; No rebound or guarding  Skin: Warm and dry.    LABS:                        8.6    28.54 )-----------( 237      ( 04 May 2023 05:18 )             27.4     05-04    136  |  104  |  28<H>  ----------------------------<  131<H>  4.3   |  25  |  0.68    Ca    8.1<L>      04 May 2023 05:18  Phos  2.7     05-04  Mg     2.1     05-04    TPro  5.0<L>  /  Alb  2.4<L>  /  TBili  10.6<H>  /  DBili  x   /  AST  133<H>  /  ALT  79<H>  /  AlkPhos  406<H>  05-04    PT/INR - ( 03 May 2023 05:30 )   PT: 13.7 sec;   INR: 1.15          PTT - ( 03 May 2023 05:30 )  PTT:26.5 sec                  RADIOLOGY & ADDITIONAL STUDIES:  Reviewed

## 2023-05-04 NOTE — PROGRESS NOTE ADULT - SUBJECTIVE AND OBJECTIVE BOX
ICU Progress Note      INTERVAL HPI/OVERNIGHT EVENTS:   No overnight events   Afebrile, hemodynamically stable     Subjective: Patient seen and examined at bedside on CPAP trial, arousable to voice.     ICU Vital Signs Last 24 Hrs  T(C): 37.8 (04 May 2023 10:50), Max: 37.8 (04 May 2023 10:50)  T(F): 100 (04 May 2023 10:50), Max: 100 (04 May 2023 10:50)  HR: 99 (04 May 2023 13:00) (79 - 109)  BP: 120/66 (04 May 2023 10:50) (88/51 - 132/83)  BP(mean): 88 (04 May 2023 10:50) (65 - 90)  ABP: 107/56 (04 May 2023 13:00) (88/46 - 123/66)  ABP(mean): 76 (04 May 2023 13:00) (61 - 90)  RR: 17 (04 May 2023 13:00) (14 - 23)  SpO2: 95% (04 May 2023 13:00) (92% - 100%)    O2 Parameters below as of 04 May 2023 13:00  Patient On (Oxygen Delivery Method): Ventilator Cpap    O2 Concentration (%): 30      I&O's Summary    03 May 2023 07:01  -  04 May 2023 07:00  --------------------------------------------------------  IN: 599.9 mL / OUT: 200 mL / NET: 399.9 mL    04 May 2023 07:01  -  04 May 2023 14:07  --------------------------------------------------------  IN: 10.5 mL / OUT: 50 mL / NET: -39.5 mL      Mode: CPAP with PS  FiO2: 30  PEEP: 5  PS: 7  MAP: 7  PIP: 12      PHYSICAL EXAM:  GENERAL: NAD intubated   EYES: pupils sluggish, conjunctiva and sclera clear  NECK: Supple, No JVD  HEART: Regular rate and rhythm; No murmurs, rubs, or gallops  RESPIRATORY: CTA B/L, No W/R/R  ABDOMEN: Soft, Nontender, Nondistended; hypoactive bowel sounds present  NEUROLOGY: not following commands, move extremities to stimuli  EXTREMITIES:  2+ Peripheral Pulses, No clubbing, cyanosis, or edema  SKIN: warm, dry, jaundiced skin    LABS:                        8.6    28.54 )-----------( 237      ( 04 May 2023 05:18 )             27.4     05-04    136  |  104  |  28<H>  ----------------------------<  131<H>  4.3   |  25  |  0.68    Ca    8.1<L>      04 May 2023 05:18  Phos  2.7     05-04  Mg     2.1     05-04    TPro  5.0<L>  /  Alb  2.4<L>  /  TBili  10.6<H>  /  DBili  x   /  AST  133<H>  /  ALT  79<H>  /  AlkPhos  406<H>  05-04    PT/INR - ( 03 May 2023 05:30 )   PT: 13.7 sec;   INR: 1.15          PTT - ( 03 May 2023 05:30 )  PTT:26.5 sec    CAPILLARY BLOOD GLUCOSE      POCT Blood Glucose.: 117 mg/dL (04 May 2023 11:35)    ABG - ( 04 May 2023 05:26 )  pH, Arterial: 7.41  pH, Blood: x     /  pCO2: 42    /  pO2: 97    / HCO3: 27    / Base Excess: 1.7   /  SaO2: incalc               Consultant(s) Notes Reviewed:  [x ] YES  [ ] NO    MEDICATIONS  (STANDING):  chlorhexidine 0.12% Liquid 15 milliLiter(s) Oral Mucosa every 12 hours  chlorhexidine 2% Cloths 1 Application(s) Topical <User Schedule>  dexMEDEtomidine Infusion 0.1 MICROgram(s)/kG/Hr (1.12 mL/Hr) IV Continuous <Continuous>  metoclopramide Injectable 10 milliGRAM(s) IV Push every 6 hours  pantoprazole  Injectable 40 milliGRAM(s) IV Push every 12 hours  propofol Infusion 35 MICROgram(s)/kG/Min (9.43 mL/Hr) IV Continuous <Continuous>    MEDICATIONS  (PRN):  ondansetron Injectable 4 milliGRAM(s) IV Push every 8 hours PRN Nausea and/or Vomiting      Care Discussed with Consultants/Other Providers [ x] YES  [ ] NO    RADIOLOGY & ADDITIONAL TESTS: ICU Progress Note      INTERVAL HPI/OVERNIGHT EVENTS:   No overnight events   Afebrile, hemodynamically stable   .  Subjective: Patient seen and examined at bedside on CPAP trial, arousable to voice. Breathing comfortably, non tachypneic    ICU Vital Signs Last 24 Hrs  T(C): 37.8 (04 May 2023 10:50), Max: 37.8 (04 May 2023 10:50)  T(F): 100 (04 May 2023 10:50), Max: 100 (04 May 2023 10:50)  HR: 99 (04 May 2023 13:00) (79 - 109)  BP: 120/66 (04 May 2023 10:50) (88/51 - 132/83)  BP(mean): 88 (04 May 2023 10:50) (65 - 90)  ABP: 107/56 (04 May 2023 13:00) (88/46 - 123/66)  ABP(mean): 76 (04 May 2023 13:00) (61 - 90)  RR: 17 (04 May 2023 13:00) (14 - 23)  SpO2: 95% (04 May 2023 13:00) (92% - 100%)    O2 Parameters below as of 04 May 2023 13:00  Patient On (Oxygen Delivery Method): Ventilator Cpap    O2 Concentration (%): 30      I&O's Summary    03 May 2023 07:01  -  04 May 2023 07:00  --------------------------------------------------------  IN: 599.9 mL / OUT: 200 mL / NET: 399.9 mL    04 May 2023 07:01  -  04 May 2023 14:07  --------------------------------------------------------  IN: 10.5 mL / OUT: 50 mL / NET: -39.5 mL      Mode: CPAP with PS  FiO2: 30  PEEP: 5  PS: 7  MAP: 7  PIP: 12      PHYSICAL EXAM:  GENERAL: NAD intubated   EYES: pupils sluggish, conjunctiva and sclera clear  NECK: Supple, No JVD  HEART: Regular rate and rhythm; No murmurs, rubs, or gallops  RESPIRATORY: CTA B/L, No W/R/R  ABDOMEN: Soft, Nontender, Nondistended; hypoactive bowel sounds present  NEUROLOGY: not following commands, move extremities to stimuli  EXTREMITIES:  2+ Peripheral Pulses, No clubbing, cyanosis, or edema  SKIN: warm, dry, jaundiced skin    LABS:                        8.6    28.54 )-----------( 237      ( 04 May 2023 05:18 )             27.4     05-04    136  |  104  |  28<H>  ----------------------------<  131<H>  4.3   |  25  |  0.68    Ca    8.1<L>      04 May 2023 05:18  Phos  2.7     05-04  Mg     2.1     05-04    TPro  5.0<L>  /  Alb  2.4<L>  /  TBili  10.6<H>  /  DBili  x   /  AST  133<H>  /  ALT  79<H>  /  AlkPhos  406<H>  05-04    PT/INR - ( 03 May 2023 05:30 )   PT: 13.7 sec;   INR: 1.15          PTT - ( 03 May 2023 05:30 )  PTT:26.5 sec    CAPILLARY BLOOD GLUCOSE      POCT Blood Glucose.: 117 mg/dL (04 May 2023 11:35)    ABG - ( 04 May 2023 05:26 )  pH, Arterial: 7.41  pH, Blood: x     /  pCO2: 42    /  pO2: 97    / HCO3: 27    / Base Excess: 1.7   /  SaO2: incalc               Consultant(s) Notes Reviewed:  [x ] YES  [ ] NO    MEDICATIONS  (STANDING):  chlorhexidine 0.12% Liquid 15 milliLiter(s) Oral Mucosa every 12 hours  chlorhexidine 2% Cloths 1 Application(s) Topical <User Schedule>  dexMEDEtomidine Infusion 0.1 MICROgram(s)/kG/Hr (1.12 mL/Hr) IV Continuous <Continuous>  metoclopramide Injectable 10 milliGRAM(s) IV Push every 6 hours  pantoprazole  Injectable 40 milliGRAM(s) IV Push every 12 hours  propofol Infusion 35 MICROgram(s)/kG/Min (9.43 mL/Hr) IV Continuous <Continuous>    MEDICATIONS  (PRN):  ondansetron Injectable 4 milliGRAM(s) IV Push every 8 hours PRN Nausea and/or Vomiting      Care Discussed with Consultants/Other Providers [ x] YES  [ ] NO    RADIOLOGY & ADDITIONAL TESTS: ICU Progress Note    INTERVAL HPI/OVERNIGHT EVENTS:   No overnight events, Afebrile, hemodynamically stable   Subjective: Patient seen and examined at bedside on CPAP trial, arousable to voice. Breathing comfortably, non tachypneic    ICU Vital Signs Last 24 Hrs  T(C): 37.8 (04 May 2023 10:50), Max: 37.8 (04 May 2023 10:50)  T(F): 100 (04 May 2023 10:50), Max: 100 (04 May 2023 10:50)  HR: 99 (04 May 2023 13:00) (79 - 109)  BP: 120/66 (04 May 2023 10:50) (88/51 - 132/83)  BP(mean): 88 (04 May 2023 10:50) (65 - 90)  ABP: 107/56 (04 May 2023 13:00) (88/46 - 123/66)  ABP(mean): 76 (04 May 2023 13:00) (61 - 90)  RR: 17 (04 May 2023 13:00) (14 - 23)  SpO2: 95% (04 May 2023 13:00) (92% - 100%)    O2 Parameters below as of 04 May 2023 13:00  Patient On (Oxygen Delivery Method): Ventilator Cpap    O2 Concentration (%): 30      I&O's Summary    03 May 2023 07:01  -  04 May 2023 07:00  --------------------------------------------------------  IN: 599.9 mL / OUT: 200 mL / NET: 399.9 mL    04 May 2023 07:01  -  04 May 2023 14:07  --------------------------------------------------------  IN: 10.5 mL / OUT: 50 mL / NET: -39.5 mL      Mode: CPAP with PS  FiO2: 30  PEEP: 5  PS: 7  MAP: 7  PIP: 12      PHYSICAL EXAM:  GENERAL: NAD intubated   EYES: pupils sluggish, conjunctiva and sclera clear  NECK: Supple, No JVD  HEART: Regular rate and rhythm; No murmurs, rubs, or gallops  RESPIRATORY: CTA B/L, No W/R/R  ABDOMEN: Soft, Nontender, Nondistended; hypoactive bowel sounds present  NEUROLOGY: not following commands, move extremities to stimuli  EXTREMITIES:  2+ Peripheral Pulses, No clubbing, cyanosis, or edema  SKIN: warm, dry, jaundiced skin    LABS:                        8.6    28.54 )-----------( 237      ( 04 May 2023 05:18 )             27.4     05-04    136  |  104  |  28<H>  ----------------------------<  131<H>  4.3   |  25  |  0.68    Ca    8.1<L>      04 May 2023 05:18  Phos  2.7     05-04  Mg     2.1     05-04    TPro  5.0<L>  /  Alb  2.4<L>  /  TBili  10.6<H>  /  DBili  x   /  AST  133<H>  /  ALT  79<H>  /  AlkPhos  406<H>  05-04    PT/INR - ( 03 May 2023 05:30 )   PT: 13.7 sec;   INR: 1.15          PTT - ( 03 May 2023 05:30 )  PTT:26.5 sec    CAPILLARY BLOOD GLUCOSE      POCT Blood Glucose.: 117 mg/dL (04 May 2023 11:35)    ABG - ( 04 May 2023 05:26 )  pH, Arterial: 7.41  pH, Blood: x     /  pCO2: 42    /  pO2: 97    / HCO3: 27    / Base Excess: 1.7   /  SaO2: incalc               Consultant(s) Notes Reviewed:  [x ] YES  [ ] NO    MEDICATIONS  (STANDING):  chlorhexidine 0.12% Liquid 15 milliLiter(s) Oral Mucosa every 12 hours  chlorhexidine 2% Cloths 1 Application(s) Topical <User Schedule>  dexMEDEtomidine Infusion 0.1 MICROgram(s)/kG/Hr (1.12 mL/Hr) IV Continuous <Continuous>  metoclopramide Injectable 10 milliGRAM(s) IV Push every 6 hours  pantoprazole  Injectable 40 milliGRAM(s) IV Push every 12 hours  propofol Infusion 35 MICROgram(s)/kG/Min (9.43 mL/Hr) IV Continuous <Continuous>    MEDICATIONS  (PRN):  ondansetron Injectable 4 milliGRAM(s) IV Push every 8 hours PRN Nausea and/or Vomiting      Care Discussed with Consultants/Other Providers [ x] YES  [ ] NO    RADIOLOGY & ADDITIONAL TESTS:

## 2023-05-04 NOTE — PROGRESS NOTE ADULT - ASSESSMENT
This is a 83F with dementia and HTN presents with worsening jaundice and anemia found to have obstructive jaundice from large uncinate mass. S/p endoscopic evaluation with biopsy. Now s/p endoscopic choledochoduodenostomy and GJ with course complicated by aspiration and intubation.      - no acute surgical intervention  - Appreciate excellent MICU care  - f/u GI recs  - Surgery Team 1C will continue to follow. Please page Team 1 with questions/clinical changes. [phone]

## 2023-05-04 NOTE — PROGRESS NOTE ADULT - ASSESSMENT
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    Neurology:    Pulmonary:  #AHRF  patient unable to be extubated in endoscopy today  DNR DNI was temporarily rescinded for procedure today  pt also with hx of pulmonary metastases  is likely 2/2 ards after aspiration   POCUS showing diffuse b lines with small consolidation at R base  -upgrade to micu 2/2 intubation  -discuss with family regarding intubation/extubation plan as pt was DNR DNI prior with hospice planning  -vent settings at this time, VCAC: RR 12 FIO2 70  PEEP 8   -f/u ABG after changes    Cardiology:    GI:  #Adenocarcinoma of pancreas.    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 GI and Surgery consultation placed with plans for ERCP with CBD stenting/biopsy for diagnosis. CTAP 4/25: cystic/necrotic lesion in uncinate process of pancreas 6x10cm, CBD dilation 2.4cm, pulmonary nodules suspicious for metastasis.   Patient now s/p EUS-ERCP on 4/27 for biliary stent, unable to place due to mass morphology, choledocoduodenostomy today 5/3.  unable to be extubated - see above problem   Plan:   - c/w GOC  - f/u GI recs  - monitor for post ERCP complications (CBC, pain control/monitoring)   - c/w PPI iv bid   - c/w Reglan 10mg iv q6hr as pro-motility agent     :    Renal:    Endocrine:    ID:    FEN: NPO, Replete lytes PRN K>4, Mg>2  PPx: Hep SQ, SCDs  Code: FULL CODE  Dispo: Patient requires continued monitoring in MICU   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    Neurology:  #Intubated and sedated  C/w fent and propofol for sedation  - Daily SAT/SBT    Pulmonary:  #AHRF  patient unable to be extubated in endoscopy today  DNR DNI was temporarily rescinded for procedure today  pt also with hx of pulmonary metastases  is likely 2/2 ards after aspiration   POCUS showing diffuse b lines with small consolidation at R base  -upgrade to micu 2/2 intubation  -discuss with family regarding intubation/extubation plan as pt was DNR DNI prior with hospice planning  -vent settings at this time, VCAC: RR 12 FIO2 35  PEEP 8   - F/u ABG in AM    Cardiology:  #      GI:  #Adenocarcinoma of pancreas.    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 GI and Surgery consultation placed with plans for ERCP with CBD stenting/biopsy for diagnosis. CTAP 4/25: cystic/necrotic lesion in uncinate process of pancreas 6x10cm, CBD dilation 2.4cm, pulmonary nodules suspicious for metastasis.   Patient now s/p EUS-ERCP on 4/27 for biliary stent, unable to place due to mass morphology, choledocoduodenostomy today 5/3.  unable to be extubated - see above problem   Plan:   - c/w GOC  - f/u GI recs  - monitor for post ERCP complications (CBC, pain control/monitoring)   - c/w PPI iv bid   - c/w Reglan 10mg iv q6hr as pro-motility agent     :    Renal:    Endocrine:    ID:  #Leukocytosis  Afebrile for 48hrs+,     FEN: NPO, Replete lytes PRN K>4, Mg>2  PPx: Lovenox  Code: DNR/DNI, temporarily rescinded for procedure, pending further GOC discussion  Dispo: Patient requires continued monitoring in MICU   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 s/p attempted biliary stent placement without success, s/p choledochoduodenostomy, unable to be extubated -- stepped up to MICU.    Neurology:  #Intubated and sedated  C/w fent and propofol for sedation  - Daily SAT/SBT    Pulmonary:  #AHRF  patient unable to be extubated in endoscopy today  DNR DNI was temporarily rescinded for procedure today  pt also with hx of pulmonary metastases  is likely 2/2 ards after aspiration   POCUS showing diffuse b lines with small consolidation at R base  -upgrade to micu 2/2 intubation  -discuss with family regarding intubation/extubation plan as pt was DNR DNI prior with hospice planning  -vent settings at this time, VCAC: RR 12 FIO2 35  PEEP 8   - F/u ABG in AM    #pulmonary nodules  Suspicious for pulmonary metastasis.    Cardiology:  #hypotension  Likely 2/2 sedation    GI:  #Adenocarcinoma of pancreas.    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 GI and Surgery consultation placed with plans for ERCP with CBD stenting/biopsy for diagnosis. CTAP 4/25: cystic/necrotic lesion in uncinate process of pancreas 6x10cm, CBD dilation 2.4cm, pulmonary nodules suspicious for metastasis.   Patient now s/p EUS-ERCP on 4/27 for biliary stent, unable to place due to mass morphology, choledocoduodenostomy today 5/3.  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   On EGD performed 4/27, pt was found to have a small, linear Gwendolyn-Ruano tear visualized in the distal esophagus with some liquefied retained gastric contents that were suctioned out. Pt had 1x episode of hematemesis w clots mixed in sputum and old blood in evening of 5/2, CXR unchanged, H&H stable.     - Continue to monitor   - F/u with GI recs as above  - Pantoprazole 40mg IVP BID    Renal/:  NIMESH     Endocrine:  NIMESH    ID:  #Leukocytosis  Afebrile for 48hrs+, s/p 7 days of broad spectrum abx (merrem/zosyn). Suspect leukocytosis 2/2 advanced necrotic cancer  - ID following  - Monitor off abx, if spikes fever, restart zosyn    FEN: NPO, Replete lytes PRN K>4, Mg>2  PPx: SCDs  Code: DNR/DNI, temporarily rescinded for procedure, pending further GOC discussion  Dispo: Patient requires continued monitoring in MICU

## 2023-05-04 NOTE — PROGRESS NOTE ADULT - ATTENDING COMMENTS
Dementia, HTN, pancreatic cancer with obstructive jaundice and now AHRF with aspiration pneumonia after choledochoduodenostomy and gastric stenting for outlet obstruction  physical as above  TV 6 ml/kg, titrate FiO2 to Sa)2 over 90%  follow off antibiotics  RASS -4 for now  discussed with GI and there is no need for NGT right now

## 2023-05-04 NOTE — PROGRESS NOTE ADULT - ATTENDING COMMENTS
#Leukocytosis, metastatic pancreatic cancer    Patient underwent choledocoduodenostomy and EUS-GJ yesterday, unable to be extubated and was sent to MICU.  Team planing for extubation today.  WBC remains elevated, but patient not septic.  patient is s/p empiric broad spectrum abx for 7 days and it didn't treat leukocytosis.  Suspect leukocytosis due to advanced cancer.  Monitor off abx.  If patient becomes septic, then restart zosyn 3.375g IV q8h over 4h.      Thank you for your consult.  Please re-consult us or call us with questions.  Case d/w primary team.    Leida Donato MD, MS  Infectious Disease attending  work cell [phone]  For any questions during evening/weekend/holiday, please page ID on call

## 2023-05-04 NOTE — PROGRESS NOTE ADULT - ASSESSMENT
82 y/o Cantonese speaking, history obtained from daughter, F PMH HTN, dementia presents with 1 week of jaundice found to have pancreatic mass s/p EUS and biopsy pending choleocuduodesntosoym. Course c/b leukocytosis to 22k and Tmax 100.9 on 4/28, started on zosyn then switched to meropenem on 4/29. ID consulted for leukocytosis and IV abx duration       Afebrile for 48hr+  Ucx 4/25 contaminated  Blood cx 4/28 NGTD x2  CXR with progressive lung infiltrates bilaterally      Recommendations:   - leukocytosis plateaued 27-26-28    - afebrile off meropenem  - continue to monitor off antibiotics   - if patient becomes febrile, would resume zosyn 3.375mg q8hr         Team 1 will sign off. Thank you for the consult.   Recommendations not final until attending attestation.  84 y/o Cantonese speaking, history obtained from daughter, F PMH HTN, dementia presents with 1 week of jaundice found to have pancreatic mass s/p EUS and biopsy pending choleocuduodesntosoym. Course c/b leukocytosis to 22k and Tmax 100.9 on 4/28, started on zosyn then switched to meropenem on 4/29. ID consulted for leukocytosis and IV abx duration       Afebrile for 48hr+  Ucx 4/25 contaminated  Blood cx 4/28 NGTD x2  CXR with progressive lung infiltrates bilaterally      Recommendations:   - leukocytosis plateaued 27-26-28    - afebrile off meropenem  - continue to monitor off antibiotics   - if patient becomes febrile, would resume zosyn 3.375mg q8hr         Team 1 will sign off. Thank you for the consult.   Recommendations not final until attending attestation.

## 2023-05-04 NOTE — PROGRESS NOTE ADULT - SUBJECTIVE AND OBJECTIVE BOX
SUBJECTIVE: Patient seen and evaluated. Remians intubated and sedated        MEDICATIONS  (STANDING):  chlorhexidine 0.12% Liquid 15 milliLiter(s) Oral Mucosa every 12 hours  dexMEDEtomidine Infusion 0.1 MICROgram(s)/kG/Hr (1.12 mL/Hr) IV Continuous <Continuous>  fentaNYL   Infusion. 0.5 MICROgram(s)/kG/Hr (2.25 mL/Hr) IV Continuous <Continuous>  metoclopramide Injectable 10 milliGRAM(s) IV Push every 6 hours  pantoprazole  Injectable 40 milliGRAM(s) IV Push every 12 hours  propofol Infusion 35 MICROgram(s)/kG/Min (9.43 mL/Hr) IV Continuous <Continuous>    MEDICATIONS  (PRN):  ondansetron Injectable 4 milliGRAM(s) IV Push every 8 hours PRN Nausea and/or Vomiting      Vital Signs Last 24 Hrs  T(C): 37.4 (04 May 2023 09:01), Max: 37.4 (04 May 2023 09:01)  T(F): 99.3 (04 May 2023 09:01), Max: 99.3 (04 May 2023 09:01)  HR: 105 (04 May 2023 09:00) (79 - 105)  BP: 110/64 (04 May 2023 09:00) (88/51 - 132/83)  BP(mean): 82 (04 May 2023 09:00) (65 - 90)  RR: 17 (04 May 2023 09:00) (14 - 20)  SpO2: 93% (04 May 2023 09:00) (93% - 100%)    Parameters below as of 04 May 2023 09:00  Patient On (Oxygen Delivery Method): BiPAP/CPAP    O2 Concentration (%): 30    Physical Exam:  General: NAD, resting comfortably in bed, intubated  Pulmonary: Nonlabored breathing, no respiratory distress on vent  Cardiovascular: RRR  Abdominal: soft, non distended    I&O's Summary    03 May 2023 07:01  -  04 May 2023 07:00  --------------------------------------------------------  IN: 599.9 mL / OUT: 200 mL / NET: 399.9 mL    04 May 2023 07:01  -  04 May 2023 09:56  --------------------------------------------------------  IN: 7 mL / OUT: 0 mL / NET: 7 mL        LABS:                        8.6    28.54 )-----------( 237      ( 04 May 2023 05:18 )             27.4     05-04    136  |  104  |  28<H>  ----------------------------<  131<H>  4.3   |  25  |  0.68    Ca    8.1<L>      04 May 2023 05:18  Phos  2.7     05-04  Mg     2.1     05-04    TPro  5.0<L>  /  Alb  2.4<L>  /  TBili  10.6<H>  /  DBili  x   /  AST  133<H>  /  ALT  79<H>  /  AlkPhos  406<H>  05-04    PT/INR - ( 03 May 2023 05:30 )   PT: 13.7 sec;   INR: 1.15          PTT - ( 03 May 2023 05:30 )  PTT:26.5 sec    CAPILLARY BLOOD GLUCOSE      POCT Blood Glucose.: 134 mg/dL (03 May 2023 12:31)    LIVER FUNCTIONS - ( 04 May 2023 05:18 )  Alb: 2.4 g/dL / Pro: 5.0 g/dL / ALK PHOS: 406 U/L / ALT: 79 U/L / AST: 133 U/L / GGT: x             RADIOLOGY & ADDITIONAL STUDIES:

## 2023-05-04 NOTE — PROGRESS NOTE ADULT - ATTENDING COMMENTS
83 y/oF  Cantonese speaking PMH Dementia,  HTN, LEW ( baseline Hgb 9.7), presented with 1 week of jaundice, fatigue and decreased po intake. Pt was seen by PMD Dr. Kary Ly on 4/24/23, blood test showed Tbili 10.8, , , , WBC 22.8K, Hgb 6, and Serum Sodium 125.Pt had normal LFT one month ago ( TBili<0.2, ALP 56, AST 18 and ALT 11).  Pt was sent to [facility]/Dr. Yonas Evans whom referred pt to the hospital for further management. 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  # Failure to thrive   # Dementia   # Leukocytosis /possible leukemoid reaction.  # Severe Anemia - sp 3 units of pbrc.  # N/V   # Abd pain   # Duodenal ulcer/ Gwendolyn Ruano tear  # Coagulopathy     - intervally- she had choledochoduoxenostomy 5/3- GO, appliative EUS GJ. tolerated procedure well, post procedure remain intubated and transferred to [facility].  ( GOO explained the patient vomitting in 5/2- hopefully she will be able to tolerate some diet later on.   - palliative GI procedure to relieve- malignant obstruction - t bili is 12 ---> 10   - care  by [facility] appreciated- plan to wean as she wakes up from sedation-   - c/w PPI iv bid   - Tumor markers: CA 19-9=306 and CEA level =6.3  - Hyponatremia resolved.   - Anemia- sp 3 units PRBC since admission- hb 10.3 on 5/3-    - Leukocytosis:  possibly leukemoid, wbc in 20s, clinically not toxic appearing though she look frail, on antibiotics for almost one week, dw ID - hold off antibiotics for now - if post GI procedure -fever/chills/septic picture would need to restart.  - f/u BCx : no growth for 12hrs  - ID approval appreciated from   -  viral hepatitis serology negative     - advanced pancreatic vs duodenal CA- both biopsy with poorly differentiated adeno with sarcoid feature-  Heme Onc f/u appreciated, not a candidate for chemotherapy given frailed condition  ECOG is now at 4 ( she need full assist in daily activities)    - Surgery evaluation appreciated, note reviewed. not candidate for surgery.     - Palliative care consult appreciated for GOC discussion ( initiated),Pt is DNR/DNI, pt not a candidate for home hospice due to lack of family support  - Rehab consult appreciated rec: [facility] Vs home with home PT with increased HHA for assistance   - code status: DNR/DNI ( pt has living will) and HCP previously completed.   1st HCP: Alessandro Mares ( )  Alternate HCP: Elly Mares ( daughter)   - VTE ppx; SCDs    Contact:  Elly Mares ( daughter) [phone]   PMD: Dr. Kary Ozuna [phone]  GI: Dr. Yonas Evans/[facility] [phone]    Disposition: medically active  SW consult, pt needs wheelchair, rolling walker, and increasing HHA ( pt will benefit 24/7 HHA and at least 12hr HHA given frailed condition from this newly dx advanced metastatic pancreatic Ca Vs Deuoenal Ca w/ lung mets and biliary obs and FTT).    poc dw [facility] , GI

## 2023-05-05 NOTE — CHART NOTE - NSCHARTNOTEFT_GEN_A_CORE
Admitting Diagnosis:   Patient is a 83y old  Female who presents with a chief complaint of Anemia concern for hemolysis, SIRS (05 May 2023 10:54)      PAST MEDICAL & SURGICAL HISTORY:      Current Nutrition Order: NPO      PO Intake: N/A    GI Issues: Abdomen distended, +BS x4, LBM 4/28 - no bowel     Pain: No non-verbal indicators present     Skin Integrity: DTI sacrum, no edema noted     Labs:   05-05    139  |  107  |  23  ----------------------------<  126<H>  4.2   |  24  |  0.65    Ca    8.4      05 May 2023 04:55  Phos  2.3     05-05  Mg     2.0     05-05    TPro  5.0<L>  /  Alb  2.2<L>  /  TBili  9.6<H>  /  DBili  x   /  AST  141<H>  /  ALT  72<H>  /  AlkPhos  344<H>  05-05    CAPILLARY BLOOD GLUCOSE    Medications:  MEDICATIONS  (STANDING):  chlorhexidine 2% Cloths 1 Application(s) Topical <User Schedule>  pantoprazole  Injectable 40 milliGRAM(s) IV Push every 12 hours  piperacillin/tazobactam IVPB.. 3.375 Gram(s) IV Intermittent every 8 hours  tranexamic acid IVPB 1000 milliGRAM(s) IV Intermittent once    MEDICATIONS  (PRN):  ondansetron Injectable 4 milliGRAM(s) IV Push every 8 hours PRN Nausea and/or Vomiting    Height for BMI (FEET)	5 Feet  Height for BMI (INCHES)	0 Inch(s)  Height for BMI (CENTIMETERS)	152.4 Centimeter(s)  Weight for BMI (lbs)	99 lb  Weight for BMI (kg)	44.9 kg  Body Mass Index	19.3    Weight Change:   5/3 - 44.9 kg     Nutrition Focused Physical Exam: See Dietitian Nutrition Risk Notification on 4/27    Estimated energy needs: Updated 5/5 to reflect intubation with CBW of 44.9 kg adjusted for vented pt.   EEN: 8974-1464 kcal (25-30 kcal/kg)  EPN: 63-72 gProt. (1.4-1.6 gProt./kg)  EFN: 0374-8997 ml (25-30 ml/kg)    Subjective: 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 pending biliary stent placement.       Pt care discussed in IDT rounds. Rx and labs reviewed. Pt intubated at time of assessment; vent to CPAP, MAP 81, no pressors or propofol at time of assessment. Pt noted to have improved liver fxn with decreasing T.bili trend and LFT's improving though remain elevated overall. No NGT/OGT; no plans for nutrition at this time. Last documented BM 4/28; unable to initiate bowel care at this time d/t no access. Team made aware; hopeful to extubate soon. No other reports GI distress or further nutritional concerns at this time. RDN will continue to reassess, intervene, and monitor as appropriate.     Nutrition Diagnoses:  - Malnutrition... Severe PCM RT inadequate energy intake to meet nutrition needs AEB <75% EER for >1 month, reported wt loss.  - Inadequate oral intake r/t intubation AEB need for NPO status.     Active [ x ]  Resolved [   ]    Goal:   - Initiate nutrition within 24 hrs   - Pt will meet 75% or more of protein/energy needs via most appropriate route for nutrition    Recommendations:  -Initiate nutrition within 24 hrs    *If able to extubate, dysphagia screen and regular diet with appropriate textures/consistencies    *If EN indicated, establish route and start Jevity 1.2 @42 ml/hr with LPS x1/day providing 1008 ml TF, 1310 kcal, 71 gProt., and 813 ml FW. This provides 22.9 non-protein kcal and 1.58 gProt. per kg CBW 44.9 kg.  -Monitor pressor and propofol demands   -Monitor GI fxn; monitor ability to initiate bowel care   -Monitor skin integrity and chemistry     Risk Level: High [ x ] Moderate [   ] Low [   ]

## 2023-05-05 NOTE — PROGRESS NOTE ADULT - SUBJECTIVE AND OBJECTIVE BOX
INFECTIOUS DISEASES CONSULT FOLLOW-UP NOTE    INTERVAL HPI/OVERNIGHT EVENTS:  Intubated and sedated  Fever 100.8 restarted zosyn 3.375    ROS:   Unable to obtain ROS due to clinical condition.       ANTIBIOTICS/RELEVANT:    MEDICATIONS  (STANDING):  chlorhexidine 2% Cloths 1 Application(s) Topical <User Schedule>  pantoprazole  Injectable 40 milliGRAM(s) IV Push every 12 hours  piperacillin/tazobactam IVPB.. 3.375 Gram(s) IV Intermittent every 8 hours  tranexamic acid IVPB 1000 milliGRAM(s) IV Intermittent once    MEDICATIONS  (PRN):  ondansetron Injectable 4 milliGRAM(s) IV Push every 8 hours PRN Nausea and/or Vomiting        Vital Signs Last 24 Hrs  T(C): 37.2 (05 May 2023 10:02), Max: 38.4 (04 May 2023 18:00)  T(F): 99 (05 May 2023 10:02), Max: 101.1 (04 May 2023 18:00)  HR: 99 (05 May 2023 12:34) (93 - 126)  BP: 110/62 (05 May 2023 12:00) (91/55 - 132/61)  BP(mean): 80 (05 May 2023 12:00) (68 - 85)  RR: 23 (05 May 2023 12:00) (17 - 27)  SpO2: 96% (05 May 2023 12:34) (91% - 100%)    Parameters below as of 05 May 2023 09:00  Patient On (Oxygen Delivery Method): ventilator,CPAP         23 @ 07:  -  23 @ 07:00  --------------------------------------------------------  IN: 1202 mL / OUT: 450 mL / NET: 752 mL    23 @ 07:01  -  23 @ 12:37  --------------------------------------------------------  IN: 25 mL / OUT: 50 mL / NET: -25 mL      PHYSICAL EXAM:  Constitutional: alert, NAD  Eyes: the sclera and conjunctiva were normal.   ENT: the ears and nose were normal in appearance.   Neck: the appearance of the neck was normal and the neck was supple.   Pulmonary: no respiratory distress and lungs were clear to auscultation bilaterally.   Heart: heart rate was normal and rhythm regular, normal S1 and S2  Vascular:. there was no peripheral edema  Abdomen: normal bowel sounds, soft, non-tender  Neurological: no focal deficits.   Psychiatric: the affect was normal        LABS:                        7.6    25.12 )-----------( 191      ( 05 May 2023 04:55 )             23.9     05-05    139  |  107  |  23  ----------------------------<  126<H>  4.2   |  24  |  0.65    Ca    8.4      05 May 2023 04:55  Phos  2.3     05-05  Mg     2.0     05-05    TPro  5.0<L>  /  Alb  2.2<L>  /  TBili  9.6<H>  /  DBili  x   /  AST  141<H>  /  ALT  72<H>  /  AlkPhos  344<H>  05-05      Urinalysis Basic - ( 04 May 2023 19:38 )    Color: Brown / Appearance: SL Cloudy / S.025 / pH: x  Gluc: x / Ketone: 40 mg/dL  / Bili: Large / Urobili: 1.0 E.U./dL   Blood: x / Protein: 100 mg/dL / Nitrite: NEGATIVE   Leuk Esterase: NEGATIVE / RBC: > 10 /HPF / WBC 5-10 /HPF   Sq Epi: x / Non Sq Epi: x / Bacteria: Many /HPF        MICROBIOLOGY:      RADIOLOGY & ADDITIONAL STUDIES:  Reviewed

## 2023-05-05 NOTE — PROGRESS NOTE ADULT - ASSESSMENT
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 continue to monitor CBC  - CPAP trial today wean to HFNC iso lung metastases (pt on 2L NC prior to intubation)    Pulmonary:  #AHRF  patient transfer to MICU failed extubation trail in endoscopy after procedure (DNR DNI temporarily rescinded for procedure)  pt with hx of pulmonary metastases  POCUS showing diffuse b lines with small consolidation at R base  CXR after procedure unchanged to previous, airspace opacities likely 2/2 to pulmonary mets   -upgrade to micu 2/2 intubation  -vent settings at this time, CPAP: RR 17 FIO2 30  PEEP 5   - ABG normal  - plan for extubation trial today     #pulmonary nodules  Suspicious for pulmonary metastasis.    Cardiology:  #hypotension  Likely 2/2 sedation    GI:  #Adenocarcinoma of pancreas.    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 GI and Surgery consultation placed with plans for ERCP with CBD stenting/biopsy for diagnosis. CTAP 4/25: cystic/necrotic lesion in uncinate process of pancreas 6x10cm, CBD dilation 2.4cm, pulmonary nodules suspicious for metastasis.   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   On EGD performed 4/27, pt was found to have a small, linear Gwendolyn-Ruano tear visualized in the distal esophagus with some liquefied retained gastric contents that were suctioned out. Pt had 1x episode of hematemesis w clots mixed in sputum and old blood in evening of 5/2, CXR unchanged, H&H stable.   - Continue to monitor   - F/u with GI recs as above  - Pantoprazole 40mg IVP BID    Renal/:  NIMESH     Endocrine:  NIMESH    ID:  #Leukocytosis  Afebrile for 48hrs+, s/p 7 days of broad spectrum abx (merrem/zosyn). 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 continue to monitor CBC  - CPAP trial today wean to HFNC iso lung metastases (pt on 2L NC prior to intubation)    Pulmonary:  #AHRF  patient transfer to MICU failed extubation trail in endoscopy after procedure (DNR DNI temporarily rescinded for procedure)  pt with hx of pulmonary metastases  POCUS showing diffuse b lines with small consolidation at R base  CXR after procedure unchanged to previous, airspace opacities likely 2/2 to pulmonary mets   -upgrade to micu 2/2 intubation  -   - ABG normal    #pulmonary nodules  Suspicious for pulmonary metastasis.    Cardiology:  NAEI    GI:  #Adenocarcinoma of pancreas.    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 GI and Surgery consultation placed with plans for ERCP with CBD stenting/biopsy for diagnosis. CTAP 4/25: cystic/necrotic lesion in uncinate process of pancreas 6x10cm, CBD dilation 2.4cm, pulmonary nodules suspicious for metastasis.   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   On EGD performed 4/27, pt was found to have a small, linear Gwendolyn-Ruano tear visualized in the distal esophagus with some liquefied retained gastric contents that were suctioned out. Pt had 1x episode of hematemesis w clots mixed in sputum and old blood in evening of 5/2, CXR unchanged, H&H stable.   - Continue to monitor   - F/u with GI recs as above  - Pantoprazole 40mg IVP BID    Renal/:  NIMESH     Endocrine:  NIMESH    ID:  #sepsis  Afebrile for 48hrs+, s/p 7 days of broad spectrum abx (merrem/zosyn).  - ID consulted, following- believes to be transient fever reactive to procedure  - f/u sputum cx  - f/u bcx (can deescalate bcx negative at 48 hours)  - Monitor off abx, if spikes fever, restart zosyn    FEN: NPO, Replete lytes PRN K>4, Mg>2  PPx: SCDs  Code: DNR/DNI, temporarily rescinded for procedure, pending further GOC discussion  Dispo: [facility] 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 continue to monitor CBC  - CPAP trial today wean to HFNC iso lung metastases (pt on 2L NC prior to intubation)    Pulmonary:  #AHRF  patient transfer to MICU failed extubation trail in endoscopy after procedure (DNR DNI temporarily rescinded for procedure)  pt with hx of pulmonary metastases  POCUS showing diffuse b lines with small consolidation at R base  CXR after procedure unchanged to previous, airspace opacities likely 2/2 to pulmonary mets   -upgrade to micu 2/2 intubation  -   - ABG normal    #pulmonary nodules  Suspicious for pulmonary metastasis.    Cardiology:  NAEI    GI:  #Adenocarcinoma of pancreas.    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 GI and Surgery consultation placed with plans for ERCP with CBD stenting/biopsy for diagnosis. CTAP 4/25: cystic/necrotic lesion in uncinate process of pancreas 6x10cm, CBD dilation 2.4cm, pulmonary nodules suspicious for metastasis.   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   On EGD performed 4/27, pt was found to have a small, linear Gwendolyn-Ruano tear in the distal esophagus with retained gastric contents. Pt had 1x episode of hematemesis w clots mixed in sputum and old blood in evening of 5/2, CXR unchanged, H&H stable.   - Continue to monitor   - F/u with GI recs as above  - c/w Pantoprazole 40mg IVP BID    Renal/:  #ATN   UA showed brown granular casts. Pt with poor PO intake for last 4 days  s/p 1L LR  - place pierre to monitor urine output    Endocrine:  NIMESH    ID:  #sepsis  s/p 7 days of broad spectrum abx (merrem/zosyn).  5/5 new Tmax rectal T 101.4, WBC 28.54  - downtrending leukocytosis,   - UA no leuk esterase, nitrites  - f/u sputum cx  - f/u bcx (can deescalate bcx negative at 48 hours)  - ID consulted, following- believes to be transient fever reactive to procedure  - Monitor off abx, if spikes fever, restart zosyn    FEN: NPO, Replete lytes PRN K>4, Mg>2  PPx: SCDs  Code: DNR/DNI, temporarily rescinded for procedure, pending further GOC discussion  Dispo: MICU 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 continue to monitor CBC  - CPAP trial today wean to HFNC iso lung metastases (pt on 2L NC prior to intubation)    Pulmonary:  #AHRF  patient transfer to MICU failed extubation trail in endoscopy after procedure (DNR DNI temporarily rescinded for procedure)  pt with hx of pulmonary metastases  POCUS showing diffuse b lines with small consolidation at R base  CXR after procedure unchanged to previous, airspace opacities likely 2/2 to pulmonary mets   -upgrade to micu 2/2 intubation  -   - ABG normal    #pulmonary nodules  Suspicious for pulmonary metastasis.    Cardiology:  NAEI    GI:  #Adenocarcinoma of pancreas   Pt presenting w painless jaundice, found to have large pancreatic mass suspicious for adenocarcinoma. GI and Surgery consultation placed with plans for ERCP with CBD stenting/biopsy for diagnosis. CTAP 4/25: cystic/necrotic lesion in uncinate process of pancreas 6x10cm, CBD dilation 2.4cm, pulmonary nodules suspicious for metastasis.   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   On EGD performed 4/27, pt was found to have a small, linear Gwendolyn-Ruano tear in the distal esophagus with retained gastric contents. Pt had 1x episode of hematemesis w clots mixed in sputum and old blood in evening of 5/2, CXR unchanged, H&H stable.   - Continue to monitor   - F/u with GI recs as above  - c/w Pantoprazole 40mg IVP BID    Renal/:  #ATN   UA showed brown granular casts. Pt with poor PO intake for last 4 days  s/p 1L LR  - place pierre to monitor urine output    Endocrine:  NIMESH    ID:  #sepsis  s/p 7 days of broad spectrum abx (merrem/zosyn)  5/5 new Tmax rectal T 101.4, WBC 28.54  - downtrending leukocytosis  - UA no leuk esterase, nitrites- UTI  - f/u sputum cx- no wbc, no bacteria   - f/u bcx (can deescalate bcx negative at 48 hours)  - ID consulted, following- believes to be transient fever reactive to procedure  - Monitor off abx, if spikes fever, restart zosyn    FEN: NPO, Replete lytes PRN K>4, Mg>2  PPx: SCDs  Code: DNR/DNI, temporarily rescinded for procedure, pending further GOC discussion  Dispo: MICU

## 2023-05-05 NOTE — PROGRESS NOTE ADULT - ASSESSMENT
83F with dementia and HTN presents with worsening jaundice and anemia found to have obstructive jaundice from large uncinate mass. S/p endoscopic evaluation with biopsy. Now s/p endoscopic choledochoduodenostomy and GJ with course complicated by aspiration and intubation. Febrile and hypotensive overnight, restarted on Abx. Remains intubated however tolerated CPAP trial overnight.    - no acute surgical intervention indicated at this time  - Appreciate excellent MICU care  - f/u GI recs  - f/u GOC disuccsions  - Surgery Team 1C will continue to follow. Please page Team 1 with questions/clinical changes. [phone]

## 2023-05-05 NOTE — PROGRESS NOTE ADULT - SUBJECTIVE AND OBJECTIVE BOX
ICU Progress Note      INTERVAL HPI/OVERNIGHT EVENTS:  250cc urine overnight after 1L bolus LR    Subjective: Patient seen and examined at bedside arousable to voice, not following commands. Bloody secretions in ET tube     ICU Vital Signs Last 24 Hrs  T(C): 37.2 (05 May 2023 10:02), Max: 38.4 (04 May 2023 18:00)  T(F): 99 (05 May 2023 10:02), Max: 101.1 (04 May 2023 18:00)  HR: 99 (05 May 2023 12:34) (93 - 126)  BP: 110/62 (05 May 2023 12:00) (91/55 - 132/61)  BP(mean): 80 (05 May 2023 12:00) (68 - 85)  ABP: --  ABP(mean): --  RR: 23 (05 May 2023 12:00) (17 - 27)  SpO2: 96% (05 May 2023 12:34) (91% - 100%)    O2 Parameters below as of 05 May 2023 09:00  Patient On (Oxygen Delivery Method): ventilator,CPAP           I&O's Summary    04 May 2023 07:01  -  05 May 2023 07:00  --------------------------------------------------------  IN: 1202 mL / OUT: 450 mL / NET: 752 mL    05 May 2023 07:01  -  05 May 2023 14:07  --------------------------------------------------------  IN: 25 mL / OUT: 50 mL / NET: -25 mL      Mode: CPAP with PS  FiO2: 30  PEEP: 5  PS: 5  MAP: 7  PIP: 10      PHYSICAL EXAM:  GENERAL: No acute distress   HEAD:  Atraumatic, Normocephalic  EYES: EOMI, PERRLA, conjunctiva and sclera clear  ENMT: No tonsillar erythema, exudates, or enlargement; Moist mucous membranes  NECK: Supple, No JVD, Normal thyroid  HEART: Regular rate and rhythm; No murmurs, rubs, or gallops  RESPIRATORY: CTA B/L, No W/R/R  ABDOMEN: Soft, Nontender, Nondistended; Bowel sounds present  NEUROLOGY: A&Ox3, nonfocal, moving all extremities  EXTREMITIES:  2+ Peripheral Pulses, No clubbing, cyanosis, or edema  SKIN: jaundiced skin    LABS:                        7.6    25.12 )-----------( 191      ( 05 May 2023 04:55 )             23.9     05-    139  |  107  |  23  ----------------------------<  126<H>  4.2   |  24  |  0.65    Ca    8.4      05 May 2023 04:55  Phos  2.3     05-05  Mg     2.0     05-    TPro  5.0<L>  /  Alb  2.2<L>  /  TBili  9.6<H>  /  DBili  x   /  AST  141<H>  /  ALT  72<H>  /  AlkPhos  344<H>  05-05      Urinalysis Basic - ( 04 May 2023 19:38 )    Color: Brown / Appearance: SL Cloudy / S.025 / pH: x  Gluc: x / Ketone: 40 mg/dL  / Bili: Large / Urobili: 1.0 E.U./dL   Blood: x / Protein: 100 mg/dL / Nitrite: NEGATIVE   Leuk Esterase: NEGATIVE / RBC: > 10 /HPF / WBC 5-10 /HPF   Sq Epi: x / Non Sq Epi: x / Bacteria: Many /HPF      CAPILLARY BLOOD GLUCOSE        ABG - ( 04 May 2023 05:26 )  pH, Arterial: 7.41  pH, Blood: x     /  pCO2: 42    /  pO2: 97    / HCO3: 27    / Base Excess: 1.7   /  SaO2: incalc               Consultant(s) Notes Reviewed:  [x ] YES  [ ] NO    MEDICATIONS  (STANDING):  chlorhexidine 2% Cloths 1 Application(s) Topical <User Schedule>  pantoprazole  Injectable 40 milliGRAM(s) IV Push every 12 hours  piperacillin/tazobactam IVPB.. 3.375 Gram(s) IV Intermittent every 8 hours    MEDICATIONS  (PRN):  ondansetron Injectable 4 milliGRAM(s) IV Push every 8 hours PRN Nausea and/or Vomiting      Care Discussed with Consultants/Other Providers [ x] YES  [ ] NO    RADIOLOGY & ADDITIONAL TESTS: ICU Progress Note      INTERVAL HPI/OVERNIGHT EVENTS:  250cc urine overnight after 1L bolus LR    Subjective: Patient seen and examined at bedside arousable to voice, not following commands. Bloody secretions in ET tube     ICU Vital Signs Last 24 Hrs  T(C): 37.2 (05 May 2023 10:02), Max: 38.4 (04 May 2023 18:00)  T(F): 99 (05 May 2023 10:02), Max: 101.1 (04 May 2023 18:00)  HR: 99 (05 May 2023 12:34) (93 - 126)  BP: 110/62 (05 May 2023 12:00) (91/55 - 132/61)  BP(mean): 80 (05 May 2023 12:00) (68 - 85)  ABP: --  ABP(mean): --  RR: 23 (05 May 2023 12:00) (17 - 27)  SpO2: 96% (05 May 2023 12:34) (91% - 100%)    O2 Parameters below as of 05 May 2023 09:00  Patient On (Oxygen Delivery Method): ventilator,CPAP           I&O's Summary    04 May 2023 07:01  -  05 May 2023 07:00  --------------------------------------------------------  IN: 1202 mL / OUT: 450 mL / NET: 752 mL    05 May 2023 07:01  -  05 May 2023 14:07  --------------------------------------------------------  IN: 25 mL / OUT: 50 mL / NET: -25 mL      Mode: CPAP with PS  FiO2: 30  PEEP: 5  PS: 5  MAP: 7  PIP: 10      PHYSICAL EXAM:  GENERAL: NAD intubated undergoing CPAP trial  EYES: pupils sluggish, conjunctiva and sclera icteric  NECK: Supple, No JVD  HEART: Regular rate and rhythm; No murmurs, rubs, or gallops  RESPIRATORY: rhonchi throughout b/l  ABDOMEN: Soft, Nontender, Nondistended; hypoactive bowel sounds present  NEUROLOGY: not following commands, move extremities to painful stimuli  EXTREMITIES:  2+ Peripheral Pulses, No clubbing, cyanosis, or edema  SKIN: warm, dry, jaundiced skin      LABS:                        7.6    25.12 )-----------( 191      ( 05 May 2023 04:55 )             23.9     05-05    139  |  107  |  23  ----------------------------<  126<H>  4.2   |  24  |  0.65    Ca    8.4      05 May 2023 04:55  Phos  2.3     05-05  Mg     2.0     05-05    TPro  5.0<L>  /  Alb  2.2<L>  /  TBili  9.6<H>  /  DBili  x   /  AST  141<H>  /  ALT  72<H>  /  AlkPhos  344<H>  05-05      Urinalysis Basic - ( 04 May 2023 19:38 )    Color: Brown / Appearance: SL Cloudy / S.025 / pH: x  Gluc: x / Ketone: 40 mg/dL  / Bili: Large / Urobili: 1.0 E.U./dL   Blood: x / Protein: 100 mg/dL / Nitrite: NEGATIVE   Leuk Esterase: NEGATIVE / RBC: > 10 /HPF / WBC 5-10 /HPF   Sq Epi: x / Non Sq Epi: x / Bacteria: Many /HPF      CAPILLARY BLOOD GLUCOSE        ABG - ( 04 May 2023 05:26 )  pH, Arterial: 7.41  pH, Blood: x     /  pCO2: 42    /  pO2: 97    / HCO3: 27    / Base Excess: 1.7   /  SaO2: incalc               Consultant(s) Notes Reviewed:  [x ] YES  [ ] NO    MEDICATIONS  (STANDING):  chlorhexidine 2% Cloths 1 Application(s) Topical <User Schedule>  pantoprazole  Injectable 40 milliGRAM(s) IV Push every 12 hours  piperacillin/tazobactam IVPB.. 3.375 Gram(s) IV Intermittent every 8 hours    MEDICATIONS  (PRN):  ondansetron Injectable 4 milliGRAM(s) IV Push every 8 hours PRN Nausea and/or Vomiting      Care Discussed with Consultants/Other Providers [ x] YES  [ ] NO    RADIOLOGY & ADDITIONAL TESTS:

## 2023-05-05 NOTE — PROGRESS NOTE ADULT - SUBJECTIVE AND OBJECTIVE BOX
OPAL FOSS , [id number],  [facility] [id number]    encounter around 9:20 am   daughter at bedside  she is off sedation -not waking up yet, eyes closed, some movement of hands, not fully following command yet.  remain intubated on cpap  zosyn restarted for fever.  some bloody secretion on suction.     T(C): 37.8 (23 @ 14:40), Max: 38.4 (23 @ 18:00)  HR: 101 (23 @ 14:00) (93 - 126)  BP: 115/64 (23 @ 14:00) (91/55 - 132/61)  RR: 25 (23 @ 14:00) (17 - 31)  SpO2: 96% (23 @ 14:00) (91% - 100%)    chlorhexidine 2% Cloths 1 Application(s) Topical <User Schedule>  ondansetron Injectable 4 milliGRAM(s) IV Push every 8 hours PRN  pantoprazole  Injectable 40 milliGRAM(s) IV Push every 12 hours  piperacillin/tazobactam IVPB.. 3.375 Gram(s) IV Intermittent every 8 hours      Physical Exam :    General exam :  intubated, eyes closed, resist attempt to open eyes.   CVS : RRR no murmur   Lungs : good air entry bilaterally   Abdomen : BS present, soft   Extremities: no edema, no tenderness.  Neuro : intubated, not following command.  Skin : warm and dry.      CBC Full  -  ( 05 May 2023 04:55 )  WBC Count : 25.12 K/uL  RBC Count : 2.43 M/uL  Hemoglobin : 7.6 g/dL  Hematocrit : 23.9 %  Platelet Count - Automated : 191 K/uL  Mean Cell Volume : 98.4 fl  Mean Cell Hemoglobin : 31.3 pg  Mean Cell Hemoglobin Concentration : 31.8 gm/dL  Auto Neutrophil # : x  Auto Lymphocyte # : x  Auto Monocyte # : x  Auto Eosinophil # : x  Auto Basophil # : x  Auto Neutrophil % : x  Auto Lymphocyte % : x  Auto Monocyte % : x  Auto Eosinophil % : x  Auto Basophil % : x            139  |  107  |  23  ----------------------------<  126<H>  4.2   |  24  |  0.65    Ca    8.4      05 May 2023 04:55  Phos  2.3     05-05  Mg     2.0     05-05    TPro  5.0<L>  /  Alb  2.2<L>  /  TBili  9.6<H>  /  DBili  x   /  AST  141<H>  /  ALT  72<H>  /  AlkPhos  344<H>  05-05    Daily     Daily   CAPILLARY BLOOD GLUCOSE      POCT Blood Glucose.: 117 mg/dL (04 May 2023 11:35)      Culture - Blood (collected 04 May 2023 21:20)  Source: .Blood Blood  Preliminary Report (05 May 2023 11:00):    No growth at 12 hours    Culture - Sputum (collected 04 May 2023 18:27)  Source: ET Tube ET Tube  Gram Stain (04 May 2023 20:11):    No epithelial cells    Rare WBC's    No organisms seen  Preliminary Report (05 May 2023 09:03):    Culture in progress      Urinalysis Basic - ( 04 May 2023 19:38 )    Color: Brown / Appearance: SL Cloudy / S.025 / pH: x  Gluc: x / Ketone: 40 mg/dL  / Bili: Large / Urobili: 1.0 E.U./dL   Blood: x / Protein: 100 mg/dL / Nitrite: NEGATIVE   Leuk Esterase: NEGATIVE / RBC: > 10 /HPF / WBC 5-10 /HPF   Sq Epi: x / Non Sq Epi: x / Bacteria: Many /HPF

## 2023-05-05 NOTE — PROGRESS NOTE ADULT - ATTENDING COMMENTS
Dementia, pancreatic cancer s/p choledochoduodenostomy stent and gastric outlet stent c/b AHRF  physical as above  not awake enough to extubate  place feeding tube  bilirubin decreasing  tolerating CPAP  no clear evidence of infection but if continues with fever (cultures sent) will start antibiotics. WBC stable  SCDs  decision making of high complexity

## 2023-05-05 NOTE — PROGRESS NOTE ADULT - ASSESSMENT
82 y/o Cantonese speaking, history obtained from daughter, F PMH HTN, dementia presents with 1 week of jaundice found to have pancreatic mass s/p EUS and biopsy pending choleocuduodesntosoym. Course c/b leukocytosis to 22k and Tmax 100.9 on 4/28, started on zosyn then switched to meropenem on 4/29. ID consulted for leukocytosis and IV abx duration       Fever 100.8 overnight   Ucx 4/25 contaminated  Blood cx 4/28 NGTD x2  CXR with progressive lung infiltrates bilaterally      Recommendations:   - leukocytosis downtrending, suspect fever is likely post procedure related  - c/w zosyn 3.375 q8hr   - f/u blood cx from 5/4         Team 1 will continue to follow. Thank you for the consult.   Recommendations not final until attending attestation.

## 2023-05-05 NOTE — PROGRESS NOTE ADULT - SUBJECTIVE AND OBJECTIVE BOX
SUBJECTIVE: Patient seen and examined at bedside. Unable to cooperate with interview 2/2 drowsiness. Tolerated CPAP trial overnight.      piperacillin/tazobactam IVPB.. 3.375 Gram(s) IV Intermittent every 8 hours  tranexamic acid IVPB 1000 milliGRAM(s) IV Intermittent once      Vital Signs Last 24 Hrs  T(C): 37.2 (05 May 2023 10:02), Max: 38.4 (04 May 2023 18:00)  T(F): 99 (05 May 2023 10:02), Max: 101.1 (04 May 2023 18:00)  HR: 102 (05 May 2023 09:00) (93 - 126)  BP: 112/57 (05 May 2023 09:00) (91/55 - 132/61)  BP(mean): 78 (05 May 2023 09:00) (68 - 85)  RR: 23 (05 May 2023 09:00) (17 - 27)  SpO2: 95% (05 May 2023 09:00) (91% - 100%)    Parameters below as of 05 May 2023 09:00  Patient On (Oxygen Delivery Method): ventilator,CPAP       I&O's Detail    04 May 2023 07:01  -  05 May 2023 07:00  --------------------------------------------------------  IN:    FentaNYL: 2.2 mL    FentaNYL: 4.4 mL    IV PiggyBack: 192.5 mL    Propofol: 1.3 mL    Propofol: 2.6 mL    Sodium Chloride 0.9% Bolus: 999 mL  Total IN: 1202 mL    OUT:    Dexmedetomidine: 0 mL    Voided (mL): 450 mL  Total OUT: 450 mL    Total NET: 752 mL      05 May 2023 07:01  -  05 May 2023 10:52  --------------------------------------------------------  IN:    IV PiggyBack: 25 mL  Total IN: 25 mL    OUT:    Voided (mL): 50 mL  Total OUT: 50 mL    Total NET: -25 mL          General: NAD, resting comfortably in bed  C/V: NSR  Pulm: Nonlabored breathing, no respiratory distress  Abd: soft, NT/ND.  Extrem: WWP, no edema, SCDs in place    LABS:                        7.6    25.12 )-----------( 191      ( 05 May 2023 04:55 )             23.9     05-05    139  |  107  |  23  ----------------------------<  126<H>  4.2   |  24  |  0.65    Ca    8.4      05 May 2023 04:55  Phos  2.3     05-05  Mg     2.0     05-05    TPro  5.0<L>  /  Alb  2.2<L>  /  TBili  9.6<H>  /  DBili  x   /  AST  141<H>  /  ALT  72<H>  /  AlkPhos  344<H>  05-05      Urinalysis Basic - ( 04 May 2023 19:38 )    Color: Brown / Appearance: SL Cloudy / S.025 / pH: x  Gluc: x / Ketone: 40 mg/dL  / Bili: Large / Urobili: 1.0 E.U./dL   Blood: x / Protein: 100 mg/dL / Nitrite: NEGATIVE   Leuk Esterase: NEGATIVE / RBC: > 10 /HPF / WBC 5-10 /HPF   Sq Epi: x / Non Sq Epi: x / Bacteria: Many /HPF        RADIOLOGY & ADDITIONAL STUDIES:

## 2023-05-05 NOTE — PROGRESS NOTE ADULT - ATTENDING COMMENTS
#Fever, metastatic pancreatic cancer    Patient febrile to 101 overnight and zosyn restarted.  Still intubated, per team plan for extubation.  Patient comfortable appearing, abdomen soft.  WBC 25, BCx 5/4 ngtd. 5/4 ET tube pending. LFT downtrending.  Source of fever likely post-procedural.  Cont zosyn 3.375g IV q8h over 4h.   f/u BCx.  If BCx remains negative >48h, then depending on patient's clinical condition, will consider stopping abx.      Team 1 will follow you.  Case d/w primary team.    Leida Donato MD, MS  Infectious Disease attending  work cell [phone]   For any questions during evening/weekend/holiday, please page ID on call #Fever, metastatic pancreatic cancer    Patient febrile to 101 overnight and zosyn restarted.  Still intubated, per team plan for extubation.  Patient comfortable appearing, abdomen soft.  WBC 25, BCx 5/4 ngtd. 5/4 ET tube pending. LFT downtrending.  Source of fever likely post-procedural.  Cont zosyn 3.375g IV q8h over 4h.   f/u BCx.  If BCx remains negative >48h, then depending on patient's clinical condition, will consider stopping abx.      Team 1 will follow you.  Dr. Perry is covering me this weekend.  Case d/w primary team.    Leida Donato MD, MS  Infectious Disease attending  work cell [phone]   For any questions during evening/weekend/holiday, please page ID on call

## 2023-05-05 NOTE — PROGRESS NOTE ADULT - ATTENDING COMMENTS
83 y/oF  Cantonese speaking PMH Dementia,  HTN, LEW ( baseline Hgb 9.7), presented with 1 week of jaundice, fatigue and decreased po intake. Pt was seen by PMD Dr. Kary Ly on 4/24/23, blood test showed Tbili 10.8, , , , WBC 22.8K, Hgb 6, and Serum Sodium 125.Pt had normal LFT one month ago ( TBili<0.2, ALP 56, AST 18 and ALT 11).  Pt was sent to [facility]/Dr. Yonas Evans whom referred pt to the hospital for further management. CTAP showed a large necrotic mass at the uncinate process of pancreas with biliary dilatation up to 2.4cm concerning primary adenocarcinoma of pancreas with biliary obstruction and pulm mets- patient admitted to [address] [place] [address] for severe hyponatremia, anemia sp 2 units of prbc -- s/p EUS guided Bx and attempted ERCP ( 4/27) : found Gwendolyn Ruano tear and DU, fungating and ulcerated mass ~ 6cm, not amenable for biliary stent. 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  # Failure to thrive   # Dementia   # Leukocytosis /possible leukemoid reaction.  # Severe Anemia - sp 3 units of pbrc.  # N/V   # Abd pain   # Duodenal ulcer/ Gwendolyn Ruano tear  # Coagulopathy     - she had choledochoduoxenostomy 5/3- GO, palliative EUS GJ. tolerated procedure well, post procedure remain intubated and transferred to micu.  ( GOO explained the patient vomitting in 5/2- hopefully she will be able to tolerate some diet later on.   - palliative GI procedure to relieve- malignant obstruction - t bili is 12 ---> 10 -->   - care  by micu appreciated- plan to wean as she wakes up from sedation-   - she remain lethargic off sedation, discussed with daughter regarding patient condition, pt daughter got visa and coming from [facility] will arrive Tuesday - if not ready for extubation -would need to start feeding via NGT   - c/w PPI iv bid   - Tumor markers: CA 19-9=306 and CEA level =6.3  - Hyponatremia resolved.   - Anemia- sp 3 units PRBC since admission- hb 7.6 on 5/5- prn transfusion.     - persistent Leukocytosis:  possibly leukemoid/related to malignancy, sp vancomcyin (4/28-5/1), meropenem 4/28-5/2 ), restarted on Zosyn 5/4- for fever  - f/u BCx :   - ID approval appreciated from   -  viral hepatitis serology negative     - advanced pancreatic vs duodenal CA- both biopsy with poorly differentiated adeno with sarcoid feature-  Heme Onc f/u appreciated, not a candidate for chemotherapy given frailed condition  ECOG is now at 4 ( she need full assist in daily activities)    - Surgery evaluation appreciated, note reviewed. not candidate for surgery.     - Palliative care consult appreciated for GOC discussion ( initiated),Pt is DNR/DNI, reverse for procedure ( palliative GI procedure )  - code status: DNR/DNI ( pt has living will) and HCP previously completed.   1st HCP: kyle Rameshlicha Mares ( )  Alternate HCP: Elly Mares ( daughter)   - VTE ppx; SCDs    Contact:  Elly Mares ( daughter) [phone]   PMD: Dr. Kary Ozuna [phone]  GI: Dr. Yonas Evans/[facility] [phone]    Disposition: medically active- care by micu/ palliative appreciated. plan discussed.  dw daughter- pt other daughter from [facility] will arrive tuesday. Elly ( daughter ) is hoping patient will be able to make it to see her daughter from [place], pt son is still trying to get visa to come here.  remain high risk.

## 2023-05-05 NOTE — CHART NOTE - NSCHARTNOTESELECT_GEN_ALL_CORE
Event Note
Anti-infective approval note/Event Note
Gastroenterology [id number]
Nutrition Services
Off Service Note
Palliative Care Attending

## 2023-05-06 NOTE — PROGRESS NOTE ADULT - SUBJECTIVE AND OBJECTIVE BOX
SUBJECTIVE: Pt seen and examined at bedside this am by surgery team. Intubated. Receiving 1U PRBC.     Vital Signs Last 24 Hrs  T(C): 36.9 (06 May 2023 09:27), Max: 37.8 (05 May 2023 14:40)  T(F): 98.4 (06 May 2023 09:27), Max: 100.1 (05 May 2023 14:40)  HR: 94 (06 May 2023 12:00) (90 - 105)  BP: 128/58 (06 May 2023 11:00) (103/55 - 137/74)  BP(mean): 84 (06 May 2023 11:00) (73 - 98)  RR: 24 (06 May 2023 12:00) (19 - 26)  SpO2: 97% (06 May 2023 12:00) (94% - 97%)    Parameters below as of 06 May 2023 12:00  Patient On (Oxygen Delivery Method): ventilator    O2 Concentration (%): 30    PHYSICAL EXAM:   Gen: intubated   CV: RRR  Pulm: intubated, no resp distress  Abd: soft, ND, NTTP, no rebound or guarding     I&O's Detail    05 May 2023 07:01  -  06 May 2023 07:00  --------------------------------------------------------  IN:    IV PiggyBack: 470 mL    Jevity 1.2: 260 mL  Total IN: 730 mL    OUT:    Indwelling Catheter - Urethral (mL): 745 mL    Voided (mL): 50 mL  Total OUT: 795 mL    Total NET: -65 mL      06 May 2023 07:01  -  06 May 2023 13:04  --------------------------------------------------------  IN:    IV PiggyBack: 275 mL    Jevity 1.2: 200 mL  Total IN: 475 mL    OUT:    Indwelling Catheter - Urethral (mL): 140 mL  Total OUT: 140 mL    Total NET: 335 mL          LABS:                        6.8    29.86 )-----------( 213      ( 06 May 2023 05:39 )             22.4     05-06    141  |  108  |  22  ----------------------------<  133<H>  3.7   |  24  |  0.60    Ca    8.0<L>      06 May 2023 05:39  Phos  2.0     05-06  Mg     2.0     05-06    TPro  5.2<L>  /  Alb  2.1<L>  /  TBili  8.8<H>  /  DBili  x   /  AST  138<H>  /  ALT  69<H>  /  AlkPhos  385<H>  05-06    LIVER FUNCTIONS - ( 06 May 2023 05:39 )  Alb: 2.1 g/dL / Pro: 5.2 g/dL / ALK PHOS: 385 U/L / ALT: 69 U/L / AST: 138 U/L / GGT: x             CAPILLARY BLOOD GLUCOSE          RADIOLOGY & ADDITIONAL STUDIES:

## 2023-05-06 NOTE — PROGRESS NOTE ADULT - ASSESSMENT
83F with dementia and HTN presents with worsening jaundice and anemia found to have obstructive jaundice from large uncinate mass. S/p endoscopic evaluation with biopsy. Now s/p endoscopic choledochoduodenostomy and GJ with course complicated by aspiration and intubation.     - no surgical intervention indicated at this time  - care per MICU   - f/u [facility] disuccsions  - Surgery Team 1C will continue to follow. Please page Team 1 with questions/clinical changes. [phone]

## 2023-05-06 NOTE — PROGRESS NOTE ADULT - SUBJECTIVE AND OBJECTIVE BOX
ICU Progress Note    INTERVAL HPI/OVERNIGHT EVENTS:  No overnight events   Afebrile, hemodynamically stable     Subjective: Patient seen and examined at bedside intubated on ventilator. Arousable to voice. Daughter and  at bedside report other daughter will be coming from [facility] next Tuesday would like to wait for extubation Tuesday. Hb 6.8- transfuse 1U    ICU Vital Signs Last 24 Hrs  T(C): 38 (06 May 2023 18:02), Max: 38 (06 May 2023 18:02)  T(F): 100.4 (06 May 2023 18:02), Max: 100.4 (06 May 2023 18:02)  HR: 103 (06 May 2023 19:00) (90 - 105)  BP: 118/67 (06 May 2023 19:00) (103/55 - 148/72)  BP(mean): 87 (06 May 2023 19:00) (73 - 104)  ABP: --  ABP(mean): --  RR: 25 (06 May 2023 19:00) (19 - 26)  SpO2: 96% (06 May 2023 19:00) (94% - 99%)    O2 Parameters below as of 06 May 2023 19:00  Patient On (Oxygen Delivery Method): ventilator    O2 Concentration (%): 30      I&O's Summary    05 May 2023 07:01  -  06 May 2023 07:00  --------------------------------------------------------  IN: 730 mL / OUT: 795 mL / NET: -65 mL    06 May 2023 07:01  -  06 May 2023 19:47  --------------------------------------------------------  IN: 895 mL / OUT: 310 mL / NET: 585 mL      Mode: AC/ CMV (Assist Control/ Continuous Mandatory Ventilation)  RR (machine): 12  TV (machine): 270  FiO2: 30  PEEP: 5  ITime: 1  MAP: 8.6  PIP: 15      PHYSICAL EXAM:  GENERAL: No acute distress, sedated intubated  EYES: EOMI, PERRLA, conjunctiva and sclera clear  ENMT: No tonsillar erythema, exudates, or enlargement; Moist mucous membranes, ET with bloody   NECK: Supple  HEART: Regular rate and rhythm; No murmurs, rubs, or gallops  RESPIRATORY: inspiratory crackles b/l bases  ABDOMEN: Soft, Nontender, Nondistended; Bowel sounds present  NEUROLOGY: intubated sedated  EXTREMITIES:  2+ Peripheral Pulses, No clubbing, cyanosis, or edema  SKIN: jaundiced skin    LABS:                        9.6    27.83 )-----------( 191      ( 06 May 2023 16:37 )             30.5     05-06    141  |  108  |  22  ----------------------------<  133<H>  3.7   |  24  |  0.60    Ca    8.0<L>      06 May 2023 05:39  Phos  2.0     05-06  Mg     2.0     05-06    TPro  5.2<L>  /  Alb  2.1<L>  /  TBili  8.8<H>  /  DBili  x   /  AST  138<H>  /  ALT  69<H>  /  AlkPhos  385<H>  05-06        CAPILLARY BLOOD GLUCOSE            Consultant(s) Notes Reviewed:  [x ] YES  [ ] NO    MEDICATIONS  (STANDING):  chlorhexidine 2% Cloths 1 Application(s) Topical <User Schedule>  pantoprazole  Injectable 40 milliGRAM(s) IV Push every 12 hours  piperacillin/tazobactam IVPB.. 3.375 Gram(s) IV Intermittent every 8 hours  polyethylene glycol 3350 17 Gram(s) Oral two times a day    MEDICATIONS  (PRN):  acetaminophen   Oral Liquid .. 650 milliGRAM(s) Oral every 6 hours PRN Temp greater or equal to 38C (100.4F), Mild Pain (1 - 3)  ondansetron Injectable 4 milliGRAM(s) IV Push every 8 hours PRN Nausea and/or Vomiting      Care Discussed with Consultants/Other Providers [ x] YES  [ ] NO    RADIOLOGY & ADDITIONAL TESTS:

## 2023-05-06 NOTE — PROGRESS NOTE ADULT - ASSESSMENT
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 continue to monitor CBC  - CPAP trial today wean to HFNC iso lung metastases (pt on 2L NC prior to intubation)    Pulmonary:  #AHRF  patient transfer to MICU failed extubation trail in endoscopy after procedure (DNR DNI temporarily rescinded for procedure)  pt with hx of pulmonary metastases  POCUS showing diffuse b lines with small consolidation at R base  CXR after procedure unchanged to previous, airspace opacities likely 2/2 to pulmonary mets   -upgrade to micu 2/2 intubation  - off sedation since 5/4- tolerates CPAP trial but non arousable- placed back on VC/AC yesterday- no extubationdue toblood in ET tube  - ABG normal    #pulmonary nodules  Suspicious for pulmonary metastasis.    Cardiology:  NAEI    GI:  #Adenocarcinoma of pancreas   Pt presenting w painless jaundice, found to have large pancreatic mass suspicious for adenocarcinoma. GI and Surgery consultation placed with plans for ERCP with CBD stenting/biopsy for diagnosis. CTAP 4/25: cystic/necrotic lesion in uncinate process of pancreas 6x10cm, CBD dilation 2.4cm, pulmonary nodules suspicious for metastasis.   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   On EGD performed 4/27, pt was found to have a small, linear Gwendolyn-Ruano tear in the distal esophagus with retained gastric contents. Pt had 1x episode of hematemesis w clots mixed in sputum and old blood in evening of 5/2, CXR unchanged, H&H stable.   - Continue to monitor   - F/u with GI recs as above  - c/w Pantoprazole 40mg IVP BID    Renal/:  #ATN   UA showed brown granular casts. Pt with poor PO intake for last 4 days  s/p 1L LR  - strict I&O  - monitor Uop    Endocrine:  NIMESH    HEME:  #normocytic-macrocytic anemia   Hb 6.8 today likely 2/2 to traumatic intubation  - transfuse 1U pRBC- f/u posttsf CBC  - maintain active T&S  - large bore IVs    ID:  #sepsis  s/p 7 days of broad spectrum abx (merrem/zosyn)  5/5 new Tmax rectal T 101.4, WBC 28.54  - downtrending leukocytosis  - UA no leuk esterase, nitrites- UTI  - sputum cx no growth  - f/u bcx (can deescalate bcx negative at 48 hours)  - ID consulted, following- believes to be transient fever reactive to procedure  - Monitor off abx, if spikes fever, restart zosyn    FEN: JEvity @40, Replete lytes PRN K>4, Mg>2  PPx: SCDs  Code: DNR/DNI, temporarily rescinded for procedure, pending further GOC discussion  Dispo: MICU 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 s/p attempted biliary stent placement without success, s/p choledochoduodenostomy, unable to be extubated -- stepped up to MICU, currently undergoing CPAP trial for extubation pending palliative extubation 5/9    Neurology:  #Intubated for CPAP trial  - bloody secretions in ET tube   - s/p tranexamic acid 1000x1- continue to monitor secretions. continue to monitor CBC  - tolerated CPAP but non arousable- family waiting for daughter from [facility] to come next TUesday 5/9 for possible palliative extubation trial(pt on 2L NC prior to intubation)    Pulmonary:  #AHRF  patient transfer to MICU failed extubation trail in endoscopy after procedure (DNR DNI temporarily rescinded for procedure)  pt with hx of pulmonary metastases  POCUS showing diffuse b lines with small consolidation at R base  CXR after procedure unchanged to previous, airspace opacities likely 2/2 to pulmonary mets   -upgrade to micu 2/2 intubation  - off sedation since 5/4- tolerates CPAP trial but non arousable- placed back on VC/AC yesterday- no extubationdue toblood in ET tube  - ABG normal    #pulmonary nodules  Suspicious for pulmonary metastasis.    Cardiology:  NAEI    GI:  #Adenocarcinoma of pancreas   Pt presenting w painless jaundice, found to have large pancreatic mass suspicious for adenocarcinoma. GI and Surgery consultation placed with plans for ERCP with CBD stenting/biopsy for diagnosis. CTAP 4/25: cystic/necrotic lesion in uncinate process of pancreas 6x10cm, CBD dilation 2.4cm, pulmonary nodules suspicious for metastasis.   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   On EGD performed 4/27, pt was found to have a small, linear Gwednolyn-Ruano tear in the distal esophagus with retained gastric contents. Pt had 1x episode of hematemesis w clots mixed in sputum and old blood in evening of 5/2, CXR unchanged, H&H stable.   - Continue to monitor   - F/u with GI recs as above  - c/w Pantoprazole 40mg IVP BID    Renal/:  #ATN   UA showed brown granular casts. Pt with poor PO intake for last 4 days  s/p 1L LR  - strict I&O  - monitor Uop    Endocrine:  NIMESH    HEME:  #normocytic-macrocytic anemia   Hb 6.8 today likely 2/2 to traumatic intubation  - transfuse 1U pRBC- f/u posttsf CBC  - maintain active T&S  - large bore IVs    ID:  #sepsis  s/p 7 days of broad spectrum abx (merrem/zosyn)  5/5 new Tmax rectal T 101.4, WBC 28.54  - downtrending leukocytosis  - UA no leuk esterase, nitrites- UTI  - sputum cx no growth  - f/u bcx (can deescalate bcx negative at 48 hours)  - ID consulted, following- believes to be transient fever reactive to procedure  - Monitor off abx, if spikes fever, restart zosyn    FEN: JEvity @40, Replete lytes PRN K>4, Mg>2  PPx: SCDs  Code: DNR/DNI, temporarily rescinded for procedure, pending further GOC discussion  Dispo: MICU

## 2023-05-06 NOTE — PROGRESS NOTE ADULT - SUBJECTIVE AND OBJECTIVE BOX
OPAL FOSS , [id number],  [facility] [id number]    Time of encounter : 9 am .  pt remain intubated, on MV, getting blood transfusion  bloody discharge on trachea suction.  NGT placed and feeding started  she open eyes to verbal stimuli.     T(C): 36.9 (23 @ 09:27), Max: 37.8 (23 @ 14:40)  HR: 90 (23 @ 10:00) (90 - 106)  BP: 103/55 (23 @ 10:00) (103/55 - 137/74)  RR: 20 (23 @ 10:00) (19 - 31)  SpO2: 97% (23 @ 10:00) (94% - 97%)    chlorhexidine 2% Cloths 1 Application(s) Topical <User Schedule>  ondansetron Injectable 4 milliGRAM(s) IV Push every 8 hours PRN  pantoprazole  Injectable 40 milliGRAM(s) IV Push every 12 hours  piperacillin/tazobactam IVPB.. 3.375 Gram(s) IV Intermittent every 8 hours      Physical Exam :    General exam : intubated, open eyes to verbal stimuli.   CVS : RRR no murmur,   Lungs : good air entry bilaterally   Abdomen : BS present, soft, not tender, not distended.  Extremities: no edema, no tenderness.  Neuro : open eyes, unable to access.   Skin : warm and dry.   :  no pierre.      CBC Full  -  ( 06 May 2023 05:39 )  WBC Count : 29.86 K/uL  RBC Count : 2.21 M/uL  Hemoglobin : 6.8 g/dL  Hematocrit : 22.4 %  Platelet Count - Automated : 213 K/uL  Mean Cell Volume : 101.4 fl  Mean Cell Hemoglobin : 30.8 pg  Mean Cell Hemoglobin Concentration : 30.4 gm/dL  Auto Neutrophil # : 28.58 K/uL  Auto Lymphocyte # : 0.51 K/uL  Auto Monocyte # : 0.51 K/uL  Auto Eosinophil # : 0.00 K/uL  Auto Basophil # : 0.00 K/uL  Auto Neutrophil % : 95.7 %  Auto Lymphocyte % : 1.7 %  Auto Monocyte % : 1.7 %  Auto Eosinophil % : 0.0 %  Auto Basophil % : 0.0 %            141  |  108  |  22  ----------------------------<  133<H>  3.7   |  24  |  0.60    Ca    8.0<L>      06 May 2023 05:39  Phos  2.0     05-  Mg     2.0     -    TPro  5.2<L>  /  Alb  2.1<L>  /  TBili  8.8<H>  /  DBili  x   /  AST  138<H>  /  ALT  69<H>  /  AlkPhos  385<H>  -    Daily     Daily   CAPILLARY BLOOD GLUCOSE      POCT Blood Glucose.: 117 mg/dL (04 May 2023 11:35)      Culture - Blood (collected 04 May 2023 21:20)  Source: .Blood Blood  Preliminary Report (05 May 2023 23:00):    No growth at 1 day.    Culture - Sputum (collected 04 May 2023 18:27)  Source: ET Tube ET Tube  Gram Stain (04 May 2023 20:11):    No epithelial cells    Rare WBC's    No organisms seen  Preliminary Report (05 May 2023 09:03):    Culture in progress      Urinalysis Basic - ( 04 May 2023 19:38 )    Color: Brown / Appearance: SL Cloudy / S.025 / pH: x  Gluc: x / Ketone: 40 mg/dL  / Bili: Large / Urobili: 1.0 E.U./dL   Blood: x / Protein: 100 mg/dL / Nitrite: NEGATIVE   Leuk Esterase: NEGATIVE / RBC: > 10 /HPF / WBC 5-10 /HPF   Sq Epi: x / Non Sq Epi: x / Bacteria: Many /HPF

## 2023-05-06 NOTE — PROGRESS NOTE ADULT - ASSESSMENT
83 y/oF  Cantonese speaking PMH Dementia,  HTN, LEW ( baseline Hgb 9.7), presented with 1 week of jaundice, fatigue and decreased po intake. Pt was seen by PMD Dr. Kary Ly on 4/24/23, blood test showed Tbili 10.8, , , , WBC 22.8K, Hgb 6, and Serum Sodium 125.Pt had normal LFT one month ago ( TBili<0.2, ALP 56, AST 18 and ALT 11).  Pt was sent to [facility]/Dr. Yonas Evans whom referred pt to the hospital for further management. 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  # Failure to thrive   # Dementia   # Leukocytosis /possible leukemoid reaction.  # Severe Anemia - sp 3 units of pbrc.  # N/V   # Abd pain   # Duodenal ulcer/ Gwendolyn Ruano tear  # Coagulopathy     -care by micu appreciated.   - she had choledochoduoxenostomy 5/3- GO, palliative EUS GJ. tolerated procedure well, post procedure remain intubated and transferred to micu.  ( GOO explained the patient vomitting in 5/2) , encephalopathy -difficult to access mental status to access for extubation 5/5-  started NGT for nutrition., anemia hb drop 6.8- pt getting blood transfusion 5/6   - mentation improved today 5/6- she open eyes to verbal stimuli and tried to nod, hold hand with right hand.   - palliative GI procedure to relieve- malignant obstruction - t bili is 12 ---> 10 --> 8.8 on 5/6  - c/w PPI iv bid   - Tumor markers: CA 19-9=306 and CEA level =6.3  - Hyponatremia resolved.   - Anemia- sp 3 units PRBC since admission- hb 7.6 on 5/5- prn transfusion. getting 4th unit of prbc on 5/6-    - persistent Leukocytosis:  possibly leukemoid/related to malignancy, sp vancomcyin (4/28-5/1), meropenem 4/28-5/2 ), restarted on Zosyn 5/4- for fever  - f/u BCx :   - fu respiratory culture, thus far negative.   wbc 29 on 5/6   - ID following appreciated.   -  viral hepatitis serology negative     - advanced pancreatic vs duodenal CA- both biopsy with poorly differentiated adeno with sarcoid feature-  Heme Onc f/u appreciated, not a candidate for chemotherapy given frailed condition  ECOG is now at 4 ( she need full assist in daily activities)    - Surgery evaluation appreciated, note reviewed. not candidate for surgery.     - Palliative care consult appreciated for GOC discussion ( initiated),Pt is DNR/DNI, reverse for procedure ( palliative GI procedure )- currently remain intubated - on going discussion with daughter for GOC -- Elly stated her sister is coming in from [facility] would arrive Tuesday *( she would like her sister to see patient ) -since patient remain encephalopathy/not fully responsive, remain intubated (intermittently on mv and cpap ) -NGT started for nutrition 5/5- she is critically ill-   - code status: DNR/DNI ( pt has living will) and HCP previously completed.   1st HCP: kyle Faithlicha Mares ( )  Alternate HCP: Elly Mares ( daughter)   - VTE ppx; SCDs    Contact:  Elly Mares ( daughter) [phone]   PMD: Dr. Kary Ozuna [phone]  GI: Dr. Yonas Evans/[facility] [phone]    Disposition: medically active- care by micu/ palliative appreciated. plan discussed.  dw daughter- pt other daughter from [facility] will arrive tuesday. Elly ( daughter ) is hoping patient will be able to make it to see her daughter from [place], pt son is still trying to get visa to come here.  remain high risk. 83 y/oF  Cantonese speaking PMH Dementia,  HTN, LEW ( baseline Hgb 9.7), presented with 1 week of jaundice, fatigue and decreased po intake. Pt was seen by PMD Dr. Kary Ly on 4/24/23, blood test showed Tbili 10.8, , , , WBC 22.8K, Hgb 6, and Serum Sodium 125.Pt had normal LFT one month ago ( TBili<0.2, ALP 56, AST 18 and ALT 11).  Pt was sent to [facility]/Dr. Yonas Evans whom referred pt to the hospital for further management. 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  # Failure to thrive   # Dementia   # Leukocytosis /possible leukemoid reaction.  # Severe Anemia - sp 3 units of pbrc.  # N/V   # Abd pain   # Duodenal ulcer/ Gwendolyn Ruano tear  # Coagulopathy     -care by micu appreciated.   - she had choledochoduoxenostomy 5/3- GO, palliative EUS GJ. tolerated procedure well, post procedure remain intubated and transferred to micu.  ( GOO explained the patient vomitting in 5/2) , encephalopathy -difficult to access mental status to access for extubation 5/5-  started NGT for nutrition., anemia hb drop 6.8- pt getting blood transfusion 5/6   - mentation improved today 5/6- she open eyes to verbal stimuli and tried to nod, hold hand with right hand.   - palliative GI procedure to relieve- malignant obstruction - t bili is 12 ---> 10 --> 8.8 on 5/6  - c/w PPI iv bid   - Tumor markers: CA 19-9=306 and CEA level =6.3  - Hyponatremia resolved.   - Anemia- sp 3 units PRBC since admission- hb 7.6 on 5/5- prn transfusion. getting 4th unit of prbc on 5/6-    - persistent Leukocytosis:  possibly leukemoid/related to malignancy, sp vancomcyin (4/28-5/1), meropenem 4/28-5/2 ), restarted on Zosyn 5/4- for fever  - f/u BCx :   - fu respiratory culture, thus far negative.   wbc 29 on 5/6   - ID following appreciated.   -  viral hepatitis serology negative     - advanced pancreatic vs duodenal CA- both biopsy with poorly differentiated adeno with sarcoid feature-  Heme Onc f/u appreciated, not a candidate for chemotherapy given frailed condition  ECOG is now at 4 ( she need full assist in daily activities)    - Surgery evaluation appreciated, note reviewed. not candidate for surgery.     - Palliative care consult appreciated for GOC discussion ( initiated),Pt is DNR/DNI, reverse for procedure ( palliative GI procedure )- currently remain intubated - on going discussion with daughter for GOC -- Elly stated her sister is coming in from [facility] would arrive Tuesday *( she would like her sister to see patient ) -since patient remain encephalopathy/not fully responsive, remain intubated (intermittently on mv and cpap ) -NGT started for nutrition 5/5- she is critically ill-   - code status: DNR/DNI ( pt has living will) and HCP previously completed.   1st HCP: Alessandro Mares ( )  Alternate HCP: Elly Mares ( daughter)   - VTE ppx; SCDs    Contact:  Elly Mares ( daughter) [phone]   PMD: Dr. Kary Ozuna [phone]  GI: Dr. Yonas Evans/[facility] [phone]    Disposition: medically active- care by micu/ palliative appreciated. plan discussed.  GOC at bedside with daughter/ - she would open eyes intermittently   asked questions regarding tumor ( malignant , he is aware that tumor is not treatable, he would not want her to suffer. we talked about DNR- remain DNR if the heart stop to allow natural death.   we talked about pt on respiratory support, trial of SBT , family awaiting patient other daughter arrival on tuesday, son applying for emergency visa - daughter is happy that pt is getting nutrition and seems to be improving ( she stated would be best if patient can lives a few more months )- family aware remain high risk. explained about terminal extubation, family not ready yet ( waiting for other family member to arrive and monitor for response over next few days. micu team updated with family discussion.

## 2023-05-07 NOTE — PROGRESS NOTE ADULT - ASSESSMENT
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 s/p attempted biliary stent placement without success, s/p choledochoduodenostomy, unable to be extubated -- stepped up to MICU, currently undergoing CPAP trial for extubation pending palliative extubation 5/9    Neurology  #Intubated for CPAP trial  - bloody secretions in ET tube   - s/p tranexamic acid 1000x1- continue to monitor secretions. continue to monitor CBC  - tolerated CPAP but non arousable- family waiting for daughter from [facility] to come next TUesday 5/9 for possible palliative extubation trial(pt on 2L NC prior to intubation)    Pulmonary  #AHRF  patient transfer to MICU failed extubation trail in endoscopy after procedure (DNR DNI temporarily rescinded for procedure)  pt with hx of pulmonary metastases  POCUS showing diffuse b lines with small consolidation at R base  CXR after procedure unchanged to previous, airspace opacities likely 2/2 to pulmonary mets   -upgrade to micu 2/2 intubation  - off sedation since 5/4- tolerates CPAP trial but non arousable- placed back on VC/AC yesterday- no extubationdue toblood in ET tube  - ABG normal    #pulmonary nodules  Suspicious for pulmonary metastasis.    Cardiology  NAEI    GI  #Adenocarcinoma of pancreas   Pt presenting w painless jaundice, found to have large pancreatic mass suspicious for adenocarcinoma. GI and Surgery consultation placed with plans for ERCP with CBD stenting/biopsy for diagnosis. CTAP 4/25: cystic/necrotic lesion in uncinate process of pancreas 6x10cm, CBD dilation 2.4cm, pulmonary nodules suspicious for metastasis.   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   On EGD performed 4/27, pt was found to have a small, linear Gwendolyn-Ruano tear in the distal esophagus with retained gastric contents. Pt had 1x episode of hematemesis w clots mixed in sputum and old blood in evening of 5/2, CXR unchanged, H&H stable.   - Continue to monitor   - F/u with GI recs as above  - c/w Pantoprazole 40mg IVP BID    Renal/  #ATN   UA showed brown granular casts. Pt with poor PO intake for last 4 days  s/p 1L LR  - strict I&O  - monitor Uop    Endocrine  NIMESH    HEME  #normocytic-macrocytic anemia   Hb 6.8 today likely 2/2 to traumatic intubation  - transfuse 1U pRBC- f/u posttsf CBC  - maintain active T&S  - large bore IVs    ID  #sepsis  s/p 7 days of broad spectrum abx (merrem/zosyn)  5/5 new Tmax rectal T 101.4, WBC 28.54  - downtrending leukocytosis  - UA no leuk esterase, nitrites- UTI  - sputum cx no growth  - f/u bcx (can deescalate bcx negative at 48 hours)  - ID consulted, following- believes to be transient fever reactive to procedure  - Monitor off abx, if spikes fever, restart zosyn    FEN: JEvity @40, Replete lytes PRN K>4, Mg>2  PPx: SCDs  Code: DNR/DNI, temporarily rescinded for procedure, pending further GOC discussion  Dispo: MICU

## 2023-05-07 NOTE — PROGRESS NOTE ADULT - SUBJECTIVE AND OBJECTIVE BOX
OPAL FOSS , [id number],  [facility] 07EA [id number]    encounter time 10 am  open eyes to stimulus, she tried to move right hand  no tachypnea noted.      T(C): 37.9 (05-07-23 @ 14:28), Max: 38.4 (05-06-23 @ 20:00)  HR: 112 (05-07-23 @ 13:35) (90 - 124)  BP: 113/62 (05-07-23 @ 13:00) (108/64 - 155/73)  RR: 34 (05-07-23 @ 13:35) (18 - 34)  SpO2: 99% (05-07-23 @ 13:35) (91% - 100%)    acetaminophen   Oral Liquid .. 650 milliGRAM(s) Oral every 6 hours PRN  chlorhexidine 2% Cloths 1 Application(s) Topical <User Schedule>  ondansetron Injectable 4 milliGRAM(s) IV Push every 8 hours PRN  pantoprazole  Injectable 40 milliGRAM(s) IV Push every 12 hours  piperacillin/tazobactam IVPB.. 3.375 Gram(s) IV Intermittent every 8 hours  polyethylene glycol 3350 17 Gram(s) Oral two times a day      Physical Exam :    General exam : intubated, open eyes,   CVS : RRR no murmur,   Lungs : good air entry bilaterally   Abdomen : BS present, soft,  Extremities: no edema, no tenderness.  Neuro : open eyes, would move right hand.  Skin : warm and dry.  pierre with dark urine     CBC Full  -  ( 07 May 2023 05:30 )  WBC Count : 25.93 K/uL  RBC Count : 3.27 M/uL  Hemoglobin : 9.8 g/dL  Hematocrit : 31.3 %  Platelet Count - Automated : 194 K/uL  Mean Cell Volume : 95.7 fl  Mean Cell Hemoglobin : 30.0 pg  Mean Cell Hemoglobin Concentration : 31.3 gm/dL  Auto Neutrophil # : 25.02 K/uL  Auto Lymphocyte # : 0.23 K/uL  Auto Monocyte # : 0.44 K/uL  Auto Eosinophil # : 0.00 K/uL  Auto Basophil # : 0.00 K/uL  Auto Neutrophil % : 96.5 %  Auto Lymphocyte % : 0.9 %  Auto Monocyte % : 1.7 %  Auto Eosinophil % : 0.0 %  Auto Basophil % : 0.0 %        05-07    146<H>  |  114<H>  |  24<H>  ----------------------------<  182<H>  3.9   |  24  |  0.60    Ca    8.1<L>      07 May 2023 05:30  Phos  2.2     05-07  Mg     2.1     05-07    TPro  5.5<L>  /  Alb  2.2<L>  /  TBili  8.7<H>  /  DBili  x   /  AST  118<H>  /  ALT  63<H>  /  AlkPhos  448<H>  05-07    Daily     Daily   CAPILLARY BLOOD GLUCOSE          Culture - Blood (collected 04 May 2023 21:20)  Source: .Blood Blood  Preliminary Report (06 May 2023 23:00):    No growth at 2 days.    Culture - Sputum (collected 04 May 2023 18:27)  Source: ET Tube ET Tube  Gram Stain (04 May 2023 20:11):    No epithelial cells    Rare WBC's    No organisms seen  Final Report (06 May 2023 12:12):    Normal Respiratory Kenna present

## 2023-05-07 NOTE — PROGRESS NOTE ADULT - SUBJECTIVE AND OBJECTIVE BOX
SUBJECTIVE: Patient seen and examined at bedside. Intubated and somnolent, unable to cooperate with interview. Per RN, reports melanic BM x3 overnight.      piperacillin/tazobactam IVPB.. 3.375 Gram(s) IV Intermittent every 8 hours      Vital Signs Last 24 Hrs  T(C): 37.6 (07 May 2023 09:40), Max: 38.4 (06 May 2023 20:00)  T(F): 99.7 (07 May 2023 09:40), Max: 101.1 (06 May 2023 20:00)  HR: 112 (07 May 2023 13:35) (90 - 124)  BP: 113/62 (07 May 2023 13:00) (105/59 - 155/73)  BP(mean): 80 (07 May 2023 13:00) (78 - 103)  RR: 34 (07 May 2023 13:35) (18 - 34)  SpO2: 99% (07 May 2023 13:35) (91% - 100%)    Parameters below as of 07 May 2023 13:35  Patient On (Oxygen Delivery Method): ventilator    O2 Concentration (%): 30  I&O's Detail    06 May 2023 07:01  -  07 May 2023 07:00  --------------------------------------------------------  IN:    IV PiggyBack: 375 mL    Jevity 1.2: 880 mL    Lactated Ringers Bolus: 175 mL  Total IN: 1430 mL    OUT:    Indwelling Catheter - Urethral (mL): 490 mL  Total OUT: 490 mL    Total NET: 940 mL      07 May 2023 07:01  -  07 May 2023 14:20  --------------------------------------------------------  IN:    Enteral Tube Flush: 250 mL    IV PiggyBack: 500 mL    Jevity 1.2: 240 mL    Lactated Ringers Bolus: 25 mL  Total IN: 1015 mL    OUT:    Indwelling Catheter - Urethral (mL): 90 mL  Total OUT: 90 mL    Total NET: 925 mL          General: NAD, resting comfortably in bed  C/V: NSR  Pulm: Nonlabored breathing, no respiratory distress  Abd: soft, moderately distended, nontender.  Extrem: WWP, no edema, SCDs in place        LABS:                        9.8    25.93 )-----------( 194      ( 07 May 2023 05:30 )             31.3     05-07    146<H>  |  114<H>  |  24<H>  ----------------------------<  182<H>  3.9   |  24  |  0.60    Ca    8.1<L>      07 May 2023 05:30  Phos  2.2     05-07  Mg     2.1     05-07    TPro  5.5<L>  /  Alb  2.2<L>  /  TBili  8.7<H>  /  DBili  x   /  AST  118<H>  /  ALT  63<H>  /  AlkPhos  448<H>  05-07          RADIOLOGY & ADDITIONAL STUDIES:

## 2023-05-07 NOTE — PROGRESS NOTE ADULT - ASSESSMENT
83 y/oF  Cantonese speaking PMH Dementia,  HTN, LEW ( baseline Hgb 9.7), presented with 1 week of jaundice, fatigue and decreased po intake. Pt was seen by PMD Dr. Kary Ly on 4/24/23, blood test showed Tbili 10.8, , , , WBC 22.8K, Hgb 6, and Serum Sodium 125.Pt had normal LFT one month ago ( TBili<0.2, ALP 56, AST 18 and ALT 11).  Pt was sent to [facility]/Dr. Yonas Evans whom referred pt to the hospital for further management. 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  # Failure to thrive   # Dementia   # Leukocytosis /possible leukemoid reaction.  # Severe Anemia - sp 3 units of pbrc.  # N/V   # Abd pain   # Duodenal ulcer/ Gwendolyn Ruano tear  # Coagulopathy     -care by micu appreciated.   - she had choledochoduoxenostomy 5/3- GO, palliative EUS GJ. tolerated procedure well, post procedure remain intubated and transferred to micu.  ( GOO explained the patient vomitting in 5/2) , encephalopathy -difficult to access mental status to access for extubation 5/5-  started NGT for nutrition., anemia hb drop 6.8- pt getting blood transfusion 5/6   - mentation improved today 5/6- she open eyes to verbal stimuli and tried to nod, hold hand with right hand.   - palliative GI procedure to relieve- malignant obstruction - t bili is 12 ---> 10 --> 8.8 on 5/6--> 8.7 on 5/7  - c/w PPI iv bid   - Tumor markers: CA 19-9=306 and CEA level =6.3  - Hyponatremia resolved.     - Anemia- sp 4 units PRBC since admission- 4th unit of prbc on 5/6- hb 9.8 on 5/7    - persistent Leukocytosis:  possibly leukemoid/related to malignancy, sp vancomcyin (4/28-5/1), meropenem 4/28-5/2 ), restarted on Zosyn 5/4- for fever  - f/u BCx :   - fu respiratory culture, thus far negative.   wbc 29 on 5/6 , 25.9 on 5/7-   - ID following appreciated.   -  viral hepatitis serology negative     - advanced pancreatic vs duodenal CA- both biopsy with poorly differentiated adeno with sarcoid feature-  Heme Onc f/u appreciated, not a candidate for chemotherapy given frailed condition  ECOG is now at 4 ( she need full assist in daily activities)    - Surgery evaluation appreciated, note reviewed. not candidate for surgery.     - Palliative care consult appreciated for GOC discussion ( initiated),Pt is DNR/DNI, reverse for procedure ( palliative GI procedure )- currently remain intubated - on going discussion with daughter for GOC -- Elly stated her sister is coming in from [facility] would arrive Tuesday *( she would like her sister to see patient ) -since patient remain encephalopathy/not fully responsive, remain intubated (intermittently on mv and cpap ) -NGT started for nutrition 5/5- she is critically ill-   - code status: DNR/DNI ( pt has living will) and HCP previously completed.   1st HCP: Alessandro Mares ( )  Alternate HCP: Elly Mares ( daughter)   - VTE ppx; SCDs    Contact:  Elly Mares ( daughter) [phone]   PMD: Dr. Kary Ozuna [phone]  GI: Dr. Yonas Evans/lAiyah JAVIER [phone]    Disposition: medically active- care by micu/ palliative appreciated  DNR  GOC : awaiting for family member to arrive from [facility],  stated dose not want her to suffer, daughter would like to see patient response with treatment..

## 2023-05-07 NOTE — PROGRESS NOTE ADULT - SUBJECTIVE AND OBJECTIVE BOX
OPAL FOSS, 83y, Female  [id number]  Patient is a 83y old  Female who presents with a chief complaint of Anemia concern for hemolysis, SIRS (07 May 2023 15:02)      OVERNIGHT EVENTS: ADELAIDA    SUBJECTIVE: Pt assessed at bedside, more alert today    12 Point ROS Negative unless noted otherwise above.  -------------------------------------------------------------------------------  VITAL SIGNS:  Vital Signs Last 24 Hrs  T(C): 38.4 (07 May 2023 17:15), Max: 38.4 (06 May 2023 20:00)  T(F): 101.1 (07 May 2023 17:15), Max: 101.1 (06 May 2023 20:00)  HR: 121 (07 May 2023 19:00) (90 - 124)  BP: 101/58 (07 May 2023 19:00) (96/55 - 155/73)  BP(mean): 72 (07 May 2023 19:00) (71 - 103)  RR: 25 (07 May 2023 19:00) (18 - 34)  SpO2: 93% (07 May 2023 19:00) (91% - 100%)    Parameters below as of 07 May 2023 19:00  Patient On (Oxygen Delivery Method): ventilator    O2 Concentration (%): 30  I&O's Summary    06 May 2023 07:01  -  07 May 2023 07:00  --------------------------------------------------------  IN: 1430 mL / OUT: 490 mL / NET: 940 mL    07 May 2023 07:01  -  07 May 2023 19:36  --------------------------------------------------------  IN: 1505 mL / OUT: 157 mL / NET: 1348 mL        PHYSICAL EXAM:    GENERAL: No acute distress, intubated  EYES: EOMI, PERRLA, conjunctiva and sclera clear  ENMT: No tonsillar erythema, exudates, or enlargement; Moist mucous membranes, ET with bloody   NECK: Supple  HEART: Regular rate and rhythm; No murmurs, rubs, or gallops  RESPIRATORY: inspiratory crackles b/l bases  ABDOMEN: Soft, Nontender, Nondistended; Bowel sounds present  EXTREMITIES:  2+ Peripheral Pulses, No clubbing, cyanosis, or edema  SKIN: jaundiced skin    ALLERGIES:  Allergies    No Known Allergies    Intolerances        MEDICATIONS:  MEDICATIONS  (STANDING):  chlorhexidine 2% Cloths 1 Application(s) Topical <User Schedule>  pantoprazole  Injectable 40 milliGRAM(s) IV Push every 12 hours  piperacillin/tazobactam IVPB.. 3.375 Gram(s) IV Intermittent every 8 hours  polyethylene glycol 3350 17 Gram(s) Oral two times a day    MEDICATIONS  (PRN):  acetaminophen   Oral Liquid .. 650 milliGRAM(s) Oral every 6 hours PRN Temp greater or equal to 38C (100.4F), Mild Pain (1 - 3)  ondansetron Injectable 4 milliGRAM(s) IV Push every 8 hours PRN Nausea and/or Vomiting      -------------------------------------------------------------------------------  LABS:                        9.8    25.93 )-----------( 194      ( 07 May 2023 05:30 )             31.3     05-07    146<H>  |  114<H>  |  24<H>  ----------------------------<  182<H>  3.9   |  24  |  0.60    Ca    8.1<L>      07 May 2023 05:30  Phos  2.2     05-07  Mg     2.1     05-07    TPro  5.5<L>  /  Alb  2.2<L>  /  TBili  8.7<H>  /  DBili  x   /  AST  118<H>  /  ALT  63<H>  /  AlkPhos  448<H>  05-07    LIVER FUNCTIONS - ( 07 May 2023 05:30 )  Alb: 2.2 g/dL / Pro: 5.5 g/dL / ALK PHOS: 448 U/L / ALT: 63 U/L / AST: 118 U/L / GGT: x               CAPILLARY BLOOD GLUCOSE          Culture - Blood (collected 04 May 2023 21:20)  Source: .Blood Blood  Preliminary Report (06 May 2023 23:00):    No growth at 2 days.          RADIOLOGY & ADDITIONAL TESTS: Reviewed.

## 2023-05-07 NOTE — PROGRESS NOTE ADULT - ASSESSMENT
83F with dementia and HTN presents with worsening jaundice and anemia found to have obstructive jaundice from large uncinate mass. S/p endoscopic evaluation with biopsy. Now s/p endoscopic choledochoduodenostomy and GJ with course complicated by aspiration and intubation.     - no surgical intervention indicated at this time  - care per MICU   - f/u [facility] disuccsions  - Surgery Team 1C will continue to follow. Please page Team 1 with questions/clinical changes. [phone]

## 2023-05-08 NOTE — PROGRESS NOTE ADULT - ASSESSMENT
This is a 83F with dementia and HTN presents with worsening jaundice and anemia found to have obstructive jaundice from large uncinate mass. ERCP failed 4/27 with bx, now s/p endoscopic choledochoduodenostomy 5/3 and GJ with course complicated by aspiration and intubation.     - F/U GOC discussion  - F/U C difficile & GI PCR   - Appreciate excellent care per MICU for aspiration pneumonia  - Hepatobiliary (Surgery 1C) following

## 2023-05-08 NOTE — PROGRESS NOTE ADULT - SUBJECTIVE AND OBJECTIVE BOX
Patient is a 83y old  Female who presents with a chief complaint of Anemia concern for hemolysis, SIRS (08 May 2023 08:49)      INTERVAL HPI/OVERNIGHT EVENTS:   Still with bloody secretions from ET tube     Subjective: Patient seen and examined at bedside.    ICU Vital Signs Last 24 Hrs  T(C): 37.7 (08 May 2023 14:28), Max: 38.5 (07 May 2023 22:40)  T(F): 99.9 (08 May 2023 14:28), Max: 101.3 (07 May 2023 22:40)  HR: 105 (08 May 2023 16:15) (100 - 122)  BP: 98/57 (08 May 2023 16:00) (93/53 - 110/65)  BP(mean): 71 (08 May 2023 16:00) (67 - 82)  ABP: --  ABP(mean): --  RR: 30 (08 May 2023 16:15) (24 - 32)  SpO2: 98% (08 May 2023 16:15) (91% - 100%)    O2 Parameters below as of 08 May 2023 16:15  Patient On (Oxygen Delivery Method): ventilator    O2 Concentration (%): 40      I&O's Summary    07 May 2023 07:01  -  08 May 2023 07:00  --------------------------------------------------------  IN: 1965 mL / OUT: 382 mL / NET: 1583 mL    08 May 2023 07:01  -  08 May 2023 16:49  --------------------------------------------------------  IN: 430 mL / OUT: 265 mL / NET: 165 mL      Mode: CPAP with PS  FiO2: 40  PEEP: 8  PS: 5  MAP: 11  PIP: 14      PHYSICAL EXAM:  GENERAL: No acute distress   HEAD:  Atraumatic, Normocephalic  EYES: EOMI, PERRLA, conjunctiva and sclera clear  ENMT: No tonsillar erythema, exudates, or enlargement; Moist mucous membranes  NECK: Supple, No JVD, Normal thyroid  HEART: Regular rate and rhythm; No murmurs, rubs, or gallops  RESPIRATORY: CTA B/L, No W/R/R  ABDOMEN: Soft, Nontender, Nondistended; Bowel sounds present  NEUROLOGY: A&Ox3, nonfocal, moving all extremities  EXTREMITIES:  2+ Peripheral Pulses, No clubbing, cyanosis, or edema  SKIN: warm, dry, normal color, no rash or abnormal lesions    LABS:                        8.0    30.99 )-----------( 161      ( 08 May 2023 05:30 )             25.8     05-08    144  |  111<H>  |  21  ----------------------------<  164<H>  3.6   |  24  |  0.63    Ca    8.7      08 May 2023 05:30  Phos  1.2     05-08  Mg     1.9     05-08    TPro  5.0<L>  /  Alb  2.1<L>  /  TBili  7.9<H>  /  DBili  x   /  AST  118<H>  /  ALT  63<H>  /  AlkPhos  378<H>  05-08        CAPILLARY BLOOD GLUCOSE            Consultant(s) Notes Reviewed:  [x ] YES  [ ] NO    MEDICATIONS  (STANDING):  chlorhexidine 2% Cloths 1 Application(s) Topical <User Schedule>  pantoprazole  Injectable 40 milliGRAM(s) IV Push every 12 hours  piperacillin/tazobactam IVPB.. 3.375 Gram(s) IV Intermittent every 8 hours    MEDICATIONS  (PRN):  acetaminophen   Oral Liquid .. 650 milliGRAM(s) Oral every 6 hours PRN Temp greater or equal to 38C (100.4F), Mild Pain (1 - 3)  ondansetron Injectable 4 milliGRAM(s) IV Push every 8 hours PRN Nausea and/or Vomiting      Care Discussed with Consultants/Other Providers [ x] YES  [ ] NO    RADIOLOGY & ADDITIONAL TESTS: Patient is a 83y old  Female who presents with a chief complaint of Anemia concern for hemolysis, SIRS (08 May 2023 08:49)      INTERVAL HPI/OVERNIGHT EVENTS:   Still with bloody secretions from ET tube     Subjective: Patient seen and examined at bedside arousable to voice    ICU Vital Signs Last 24 Hrs  T(C): 37.7 (08 May 2023 14:28), Max: 38.5 (07 May 2023 22:40)  T(F): 99.9 (08 May 2023 14:28), Max: 101.3 (07 May 2023 22:40)  HR: 105 (08 May 2023 16:15) (100 - 122)  BP: 98/57 (08 May 2023 16:00) (93/53 - 110/65)  BP(mean): 71 (08 May 2023 16:00) (67 - 82)  ABP: --  ABP(mean): --  RR: 30 (08 May 2023 16:15) (24 - 32)  SpO2: 98% (08 May 2023 16:15) (91% - 100%)    O2 Parameters below as of 08 May 2023 16:15  Patient On (Oxygen Delivery Method): ventilator    O2 Concentration (%): 40      I&O's Summary    07 May 2023 07:01  -  08 May 2023 07:00  --------------------------------------------------------  IN: 1965 mL / OUT: 382 mL / NET: 1583 mL    08 May 2023 07:01  -  08 May 2023 16:49  --------------------------------------------------------  IN: 430 mL / OUT: 265 mL / NET: 165 mL      Mode: CPAP with PS  FiO2: 40  PEEP: 8  PS: 5  MAP: 11  PIP: 14      PHYSICAL EXAM:  GENERAL: No acute distress, RASS -3  EYES: pupils round reactive to light, conjunctiva and sclera clear  ENMT: Moist mucous membranes  NECK: Supple, No JVD  HEART: Regular rate and rhythm; No murmurs, rubs, or gallops  RESPIRATORY: rales in bases b/l  ABDOMEN: Soft, Nontender, Nondistended; Bowel sounds present  NEUROLOGY: arousable to voice   EXTREMITIES:  2+ Peripheral Pulses, No clubbing, cyanosis, or edema  SKIN: jaundiced skin    LABS:                        8.0    30.99 )-----------( 161      ( 08 May 2023 05:30 )             25.8     05-08    144  |  111<H>  |  21  ----------------------------<  164<H>  3.6   |  24  |  0.63    Ca    8.7      08 May 2023 05:30  Phos  1.2     05-08  Mg     1.9     05-08    TPro  5.0<L>  /  Alb  2.1<L>  /  TBili  7.9<H>  /  DBili  x   /  AST  118<H>  /  ALT  63<H>  /  AlkPhos  378<H>  05-08        CAPILLARY BLOOD GLUCOSE            Consultant(s) Notes Reviewed:  [x ] YES  [ ] NO    MEDICATIONS  (STANDING):  chlorhexidine 2% Cloths 1 Application(s) Topical <User Schedule>  pantoprazole  Injectable 40 milliGRAM(s) IV Push every 12 hours  piperacillin/tazobactam IVPB.. 3.375 Gram(s) IV Intermittent every 8 hours    MEDICATIONS  (PRN):  acetaminophen   Oral Liquid .. 650 milliGRAM(s) Oral every 6 hours PRN Temp greater or equal to 38C (100.4F), Mild Pain (1 - 3)  ondansetron Injectable 4 milliGRAM(s) IV Push every 8 hours PRN Nausea and/or Vomiting      Care Discussed with Consultants/Other Providers [ x] YES  [ ] NO    RADIOLOGY & ADDITIONAL TESTS:

## 2023-05-08 NOTE — PROGRESS NOTE ADULT - ASSESSMENT
83 y/oF  Cantonese speaking PMH Dementia,  HTN, LEW ( baseline Hgb 9.7), presented with 1 week of jaundice, fatigue and decreased po intake. Pt was seen by PMD Dr. Kary Ly on 4/24/23, blood test showed Tbili 10.8, , , , WBC 22.8K, Hgb 6, and Serum Sodium 125.Pt had normal LFT one month ago ( TBili<0.2, ALP 56, AST 18 and ALT 11).  Pt was sent to [facility]/Dr. Yonas Evans whom referred pt to the hospital for further management. 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  # Failure to thrive   # Dementia   # Leukocytosis /possible leukemoid reaction.  # Severe Anemia - sp 3 units of pbrc.  # N/V   # Abd pain   # Duodenal ulcer/ Gwendolyn Ruano tear  # Coagulopathy     -care by micu appreciated.   - she had choledochoduoxenostomy 5/3- GO, palliative EUS GJ. tolerated procedure well, post procedure remain intubated and transferred to micu.  ( GOO explained the patient vomitting in 5/2) , encephalopathy -difficult to access mental status to access for extubation 5/5-  started NGT for nutrition., anemia hb drop 6.8- given  blood transfusion 5/6 -bloody secretion on ET suction, micu team offered bronchoscopy for evaluation-  - intermittently open eyes since 5/6- not following commands.  - palliative GI procedure to relieve- malignant obstruction - t bili is 12 ---> 10 --> 8.8 on 5/6--> 8.7 on 5/7--> 7.9 on 5/8  - c/w PPI iv bid   - Tumor markers: CA 19-9=306 and CEA level =6.3  - Hyponatremia resolved.     - Anemia- sp 4 units PRBC since admission- 4th unit of prbc on 5/6- hb 9.8 on 5/7--> 8.6 on 5/8    - persistent Leukocytosis:  possibly leukemoid/related to malignancy, sp vancomcyin (4/28-5/1), meropenem 4/28-5/2 ), restarted on Zosyn 5/4- for fever  - f/u BCx :   - fu respiratory culture, thus far negative.   wbc 29 on 5/6 , 25.9 on 5/7-   - ID following appreciated.   -  viral hepatitis serology negative     - advanced pancreatic vs duodenal CA- both biopsy with poorly differentiated adeno with sarcoid feature-  Heme Onc f/u appreciated, not a candidate for chemotherapy given frailed condition  ECOG is now at 4 ( she need full assist in daily activities)    - Surgery evaluation appreciated, note reviewed. not candidate for surgery.     - Palliative care consult appreciated for GOC discussion ( initiated),Pt is DNR/DNI, reverse for procedure ( palliative GI procedure )- currently remain intubated - on going discussion with daughter for GOC -- Elly stated her sister is coming in from [facility] would arrive Tuesday *( she would like her sister to see patient ) -since patient remain encephalopathy/not fully responsive, remain intubated (intermittently on mv and cpap ) -NGT started for nutrition 5/5- she is critically ill-   - code status: DNR/DNI ( pt has living will) and HCP previously completed.   1st HCP: Alessandro Mares ( )  Alternate HCP: Elly Mares ( daughter)   - VTE ppx; SCDs    Contact:  Elly Mares ( daughter) [phone]   PMD: Dr. Kary Ozuna [phone]  GI: Dr. Yonas Evans/[facility] [phone]    Disposition: medically active- care by micu/ palliative appreciated, dw micu team.  DNR  GOC : awaiting for family member to arrive from [facility],  stated dose not want her to suffer, daughter would like to see patient response with treatment..

## 2023-05-08 NOTE — PROGRESS NOTE ADULT - SUBJECTIVE AND OBJECTIVE BOX
INFECTIOUS DISEASES CONSULT FOLLOW-UP NOTE    INTERVAL HPI/OVERNIGHT EVENTS:  Intubated, sedated.     ROS:   Constitutional, eyes, ENT, cardiovascular, respiratory, gastrointestinal, genitourinary, integumentary, neurological, psychiatric and heme/lymph are otherwise negative other than noted above       ANTIBIOTICS/RELEVANT:    MEDICATIONS  (STANDING):  chlorhexidine 2% Cloths 1 Application(s) Topical <User Schedule>  pantoprazole  Injectable 40 milliGRAM(s) IV Push every 12 hours  piperacillin/tazobactam IVPB.. 3.375 Gram(s) IV Intermittent every 8 hours  polyethylene glycol 3350 17 Gram(s) Oral two times a day  potassium phosphate IVPB 30 milliMole(s) IV Intermittent once  tranexamic acid IVPB 1000 milliGRAM(s) IV Intermittent once    MEDICATIONS  (PRN):  acetaminophen   Oral Liquid .. 650 milliGRAM(s) Oral every 6 hours PRN Temp greater or equal to 38C (100.4F), Mild Pain (1 - 3)  ondansetron Injectable 4 milliGRAM(s) IV Push every 8 hours PRN Nausea and/or Vomiting        Vital Signs Last 24 Hrs  T(C): 37.2 (08 May 2023 09:00), Max: 38.5 (07 May 2023 22:40)  T(F): 99 (08 May 2023 09:00), Max: 101.3 (07 May 2023 22:40)  HR: 105 (08 May 2023 09:00) (94 - 122)  BP: 108/59 (08 May 2023 09:00) (93/53 - 121/67)  BP(mean): 77 (08 May 2023 09:00) (67 - 88)  RR: 28 (08 May 2023 09:00) (24 - 34)  SpO2: 94% (08 May 2023 09:00) (91% - 100%)    Parameters below as of 08 May 2023 09:00  Patient On (Oxygen Delivery Method): ventilator    O2 Concentration (%): 40    05-07-23 @ 07:01  -  05-08-23 @ 07:00  --------------------------------------------------------  IN: 1965 mL / OUT: 382 mL / NET: 1583 mL    05-08-23 @ 07:01  -  05-08-23 @ 09:48  --------------------------------------------------------  IN: 0 mL / OUT: 20 mL / NET: -20 mL      PHYSICAL EXAM:  Constitutional: intubated, sedated  Eyes: the sclera and conjunctiva were normal.   ENT: the ears and nose were normal in appearance.   Neck: the appearance of the neck was normal and the neck was supple.   Pulmonary: no respiratory distress and lungs were clear to auscultation bilaterally.   Heart: heart rate was normal and rhythm regular, normal S1 and S2  Vascular:. there was no peripheral edema  Abdomen: normal bowel sounds, soft, non-tender  Neurological: sedated  Psychiatric: the affect was normal        LABS:                        8.0    30.99 )-----------( 161      ( 08 May 2023 05:30 )             25.8     05-08    144  |  111<H>  |  21  ----------------------------<  164<H>  3.6   |  24  |  0.63    Ca    8.7      08 May 2023 05:30  Phos  1.2     05-08  Mg     1.9     05-08    TPro  5.0<L>  /  Alb  2.1<L>  /  TBili  7.9<H>  /  DBili  x   /  AST  118<H>  /  ALT  63<H>  /  AlkPhos  378<H>  05-08          MICROBIOLOGY:      RADIOLOGY & ADDITIONAL STUDIES:  Reviewed

## 2023-05-08 NOTE — PROGRESS NOTE ADULT - SUBJECTIVE AND OBJECTIVE BOX
OPAL FOSS , [id number],  [facility] [id number] [id number]    Time of encounter : around 9 am   daughter at bedside   she open eyes to verbal, not for long  remain on MV ( per RN she tolerated only one hour of cpap yesterday  multiple bm - c diff negative  tolerating feeding.       T(C): 37.7 (05-08-23 @ 14:28), Max: 38.5 (05-07-23 @ 22:40)  HR: 105 (05-08-23 @ 16:15) (100 - 122)  BP: 98/57 (05-08-23 @ 16:00) (93/53 - 110/65)  RR: 30 (05-08-23 @ 16:15) (24 - 32)  SpO2: 98% (05-08-23 @ 16:15) (91% - 100%)    acetaminophen   Oral Liquid .. 650 milliGRAM(s) Oral every 6 hours PRN  chlorhexidine 2% Cloths 1 Application(s) Topical <User Schedule>  ondansetron Injectable 4 milliGRAM(s) IV Push every 8 hours PRN  pantoprazole  Injectable 40 milliGRAM(s) IV Push every 12 hours  piperacillin/tazobactam IVPB.. 3.375 Gram(s) IV Intermittent every 8 hours      Physical Exam :    General exam :  intubated, eyes open to verbal stimuli  she would move right hand -not moving others. not following commands.  RRR  transmitted sound from vent  bs present,   no edema noted.  neuro -not able to access.   pierre in place with dark urine.     CBC Full  -  ( 08 May 2023 05:30 )  WBC Count : 30.99 K/uL  RBC Count : 2.66 M/uL  Hemoglobin : 8.0 g/dL  Hematocrit : 25.8 %  Platelet Count - Automated : 161 K/uL  Mean Cell Volume : 97.0 fl  Mean Cell Hemoglobin : 30.1 pg  Mean Cell Hemoglobin Concentration : 31.0 gm/dL  Auto Neutrophil # : 26.61 K/uL  Auto Lymphocyte # : 2.15 K/uL  Auto Monocyte # : 1.46 K/uL  Auto Eosinophil # : 0.17 K/uL  Auto Basophil # : 0.07 K/uL  Auto Neutrophil % : 86.0 %  Auto Lymphocyte % : 6.9 %  Auto Monocyte % : 4.7 %  Auto Eosinophil % : 0.5 %  Auto Basophil % : 0.2 %        05-08    144  |  111<H>  |  21  ----------------------------<  164<H>  3.6   |  24  |  0.63    Ca    8.7      08 May 2023 05:30  Phos  1.2     05-08  Mg     1.9     05-08    TPro  5.0<L>  /  Alb  2.1<L>  /  TBili  7.9<H>  /  DBili  x   /  AST  118<H>  /  ALT  63<H>  /  AlkPhos  378<H>  05-08    Daily     Daily   CAPILLARY BLOOD GLUCOSE

## 2023-05-08 NOTE — PROGRESS NOTE ADULT - ASSESSMENT
82 y/o Cantonese speaking, history obtained from daughter, F PMH HTN, dementia presents with 1 week of jaundice found to have pancreatic mass s/p EUS and biopsy pending choleocuduodesntosoym. Course c/b leukocytosis to 22k and Tmax 100.9 on 4/28, started on zosyn then switched to meropenem on 4/29. ID consulted for leukocytosis and IV abx duration       Fever 100.8 overnight   Ucx 4/25 contaminated  Blood cx 4/28 NGTD x2  CXR with progressive lung infiltrates bilaterally  C dif negative      Recommendations:   - c/w zosyn 3.375 q8hr , will determine duration based off GOC tomorrow 5/9  - 5/4 blood cx NGTD        Team 1 will continue to follow. Thank you for the consult.   Recommendations not final until attending attestation.

## 2023-05-08 NOTE — PROGRESS NOTE ADULT - ATTENDING COMMENTS
#Fever, metastatic pancreatic cancer    Febrile to 101.3, HDS.  Patient remains intubated and sedated.  WBC remains high, 30.  BCx ngtd.  LFT stable.   Febrile since choledochoduodenostomy.  Source likely GI in setting of advanced pancreatic cancer.  Patient didn't respond to empiric broad spectrum abx so unlikely abx will help her.  Cont zosyn 3.375g IV q8h over 4h for now while awaiting for faimly members to come.   f/u BCx.  Per Dr. David, patient's family member is coming from [facility] tomorrow and likely palliative extubation after.      Team 1 will follow you.   Case d/w primary team.    Leida Donato MD, MS  Infectious Disease attending  work cell [phone]   For any questions during evening/weekend/holiday, please page ID on call.

## 2023-05-08 NOTE — PROGRESS NOTE ADULT - SUBJECTIVE AND OBJECTIVE BOX
IDENTIFICATION: This is a 83F with dementia and HTN presents with worsening jaundice and anemia found to have obstructive jaundice from large uncinate mass. ERCP failed 4/27 with bx, now s/p endoscopic choledochoduodenostomy 5/3 and GJ with course complicated by aspiration and intubation.     EVENTS:   - Multiple diarrheal episodes overnight prompting GI PCR/C diffiicle    SUBJECTIVE:   - Sedated    MEDICATIONS  (STANDING):  chlorhexidine 2% Cloths 1 Application(s) Topical <User Schedule>  pantoprazole  Injectable 40 milliGRAM(s) IV Push every 12 hours  piperacillin/tazobactam IVPB.. 3.375 Gram(s) IV Intermittent every 8 hours  polyethylene glycol 3350 17 Gram(s) Oral two times a day    MEDICATIONS  (PRN):  acetaminophen   Oral Liquid .. 650 milliGRAM(s) Oral every 6 hours PRN Temp greater or equal to 38C (100.4F), Mild Pain (1 - 3)  ondansetron Injectable 4 milliGRAM(s) IV Push every 8 hours PRN Nausea and/or Vomiting      Vital Signs Last 24 Hrs  T(C): 38 (08 May 2023 06:03), Max: 38.5 (07 May 2023 22:40)  T(F): 100.4 (08 May 2023 06:03), Max: 101.3 (07 May 2023 22:40)  HR: 112 (08 May 2023 07:00) (93 - 122)  BP: 105/59 (08 May 2023 07:00) (93/53 - 121/67)  BP(mean): 76 (08 May 2023 07:00) (67 - 88)  RR: 30 (08 May 2023 07:00) (18 - 34)  SpO2: 91% (08 May 2023 07:00) (91% - 100%)    Parameters below as of 08 May 2023 07:00  Patient On (Oxygen Delivery Method): ventilator    O2 Concentration (%): 30    Neurologic:Sedated  CV: Normal rate, regular rhythm  Pulm: Breathing comfortably with MV  Abd: Soft, non-distended; no reaction to palpation  : No Heath  Skin: No rashes  Extremities: No edema.  Psychiatric:SEdated    I&O's Detail    07 May 2023 07:01  -  08 May 2023 07:00  --------------------------------------------------------  IN:    Enteral Tube Flush: 500 mL    IV PiggyBack: 600 mL    Jevity 1.2: 840 mL    Lactated Ringers Bolus: 25 mL  Total IN: 1965 mL    OUT:    Indwelling Catheter - Urethral (mL): 382 mL  Total OUT: 382 mL    Total NET: 1583 mL      08 May 2023 07:01  -  08 May 2023 07:57  --------------------------------------------------------  IN:  Total IN: 0 mL    OUT:    Indwelling Catheter - Urethral (mL): 20 mL  Total OUT: 20 mL    Total NET: -20 mL          LABS:                        8.0    30.99 )-----------( 161      ( 08 May 2023 05:30 )             25.8     05-08    144  |  111<H>  |  21  ----------------------------<  164<H>  3.6   |  24  |  0.63    Ca    8.7      08 May 2023 05:30  Phos  1.2     05-08  Mg     1.9     05-08    TPro  5.0<L>  /  Alb  2.1<L>  /  TBili  7.9<H>  /  DBili  x   /  AST  118<H>  /  ALT  63<H>  /  AlkPhos  378<H>  05-08

## 2023-05-08 NOTE — PROGRESS NOTE ADULT - ATTENDING COMMENTS
Dementia, HTN, pancreatic CA s/p aspiration with AHRF after choledochoduodenostomy and gastric outlet stent for obstruction  physical as above  refused bronchoscopy for hemoptysis  have extubated to HFNC 60/60  continue zosyn  NG feeds on hold  daughter has said she is not to be reintubated  decision making of high complexity

## 2023-05-08 NOTE — PROGRESS NOTE ADULT - ASSESSMENT
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 continue to monitor CBC  - tolerated CPAP but non arousable- family waiting for daughter from [facility] to come next TUesday 5/9 for possible palliative extubation trial(pt on 2L NC prior to intubation)    Pulmonary  #AHRF  patient transfer to MICU failed extubation trail in endoscopy after procedure (DNR DNI temporarily rescinded for procedure)  pt with hx of pulmonary metastases  POCUS showing diffuse b lines with small consolidation at R base  CXR after procedure unchanged to previous, airspace opacities likely 2/2 to pulmonary mets   -upgrade to micu 2/2 intubation  - off sedation since 5/4- tolerates CPAP trial but non arousable- placed back on VC/AC yesterday- no extubationdue toblood in ET tube  - ABG normal    #pulmonary nodules  Suspicious for pulmonary metastasis.    Cardiology  NAEI    GI  #Adenocarcinoma of pancreas   Pt presenting w painless jaundice, found to have large pancreatic mass suspicious for adenocarcinoma. GI and Surgery consultation placed with plans for ERCP with CBD stenting/biopsy for diagnosis. CTAP 4/25: cystic/necrotic lesion in uncinate process of pancreas 6x10cm, CBD dilation 2.4cm, pulmonary nodules suspicious for metastasis.   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   On EGD performed 4/27, pt was found to have a small, linear Gwendolyn-Ruano tear in the distal esophagus with retained gastric contents. Pt had 1x episode of hematemesis w clots mixed in sputum and old blood in evening of 5/2, CXR unchanged, H&H stable.   - Continue to monitor   - F/u with GI recs as above  - c/w Pantoprazole 40mg IVP BID    Renal/  #ATN   UA showed brown granular casts. Pt with poor PO intake for last 4 days  s/p 1L LR  - strict I&O  - monitor Uop    Endocrine  NIMESH    HEME  #normocytic-macrocytic anemia   Hb 6.8 today likely 2/2 to traumatic intubation  - transfuse 1U pRBC- f/u posttsf CBC  - maintain active T&S  - large bore IVs    ID  #sepsis  s/p 7 days of broad spectrum abx (merrem/zosyn)  5/5 new Tmax rectal T 101.4, WBC 28.54  - downtrending leukocytosis  - UA no leuk esterase, nitrites- UTI  - sputum cx no growth  - f/u bcx (can deescalate bcx negative at 48 hours)  - ID consulted, following- believes to be transient fever reactive to procedure  - Monitor off abx, if spikes fever, restart zosyn    FEN: JEvity @40, Replete lytes PRN K>4, Mg>2  PPx: SCDs  Code: DNR/DNI, temporarily rescinded for procedure, pending further GOC discussion  Dispo: MICU     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 s/p attempted biliary stent placement without success, s/p choledochoduodenostomy, unable to be extubated -- stepped up to MICU, currently undergoing CPAP trial for extubation pending palliative extubation 5/9    Neurology  #Intubated for CPAP trial  - bloody secretions in ET tube family refused bronchoscopy   - s/p tranexamic acid 1000 5/5  - tranexamic acid 1000x1- continue to monitor secretions. continue to monitor CBC  - tolerated CPAP- extubation trial today     Pulmonary  #AHRF  patient transfer to MICU failed extubation trail in endoscopy after procedure (DNR DNI temporarily rescinded for procedure)  pt with hx of pulmonary metastases  POCUS showing diffuse b lines with small consolidation at R base  CXR after procedure unchanged to previous, airspace opacities likely 2/2 to pulmonary mets   - unable to be extubated after GJ stent, off sedation since 5/4- improvement in mental status  - CPAP trial with possible extubation today     #pulmonary nodules  Suspicious for pulmonary metastasis.    Cardiology  NAEI    GI  #Adenocarcinoma of pancreas   Pt presenting w painless jaundice, found to have large pancreatic mass suspicious for adenocarcinoma. GI and Surgery consultation placed with plans for ERCP with CBD stenting/biopsy for diagnosis. CTAP 4/25: cystic/necrotic lesion in uncinate process of pancreas 6x10cm, CBD dilation 2.4cm, pulmonary nodules suspicious for metastasis.   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   On EGD performed 4/27, pt was found to have a small, linear Gwendolyn-Ruano tear in the distal esophagus with retained gastric contents. Pt had 1x episode of hematemesis w clots mixed in sputum and old blood in evening of 5/2, CXR unchanged, H&H stable.   - Continue to monitor   - F/u with GI recs as above  - c/w Pantoprazole 40mg IVP BID    #diarrhea   9 bouts of diarrhea 5/7  -GI PCR, Cdiff negative  - dcd bowel regimen  - c/w monitor     Renal/  #ATN   UA showed brown granular casts. Pt with poor PO intake for last 4 days  on tube feeds  - Cr stable  - strict I&O  - monitor Uop    Endocrine  NIMESH    HEME  #normocytic-macrocytic anemia   Hb 6.8 today likely 2/2 to traumatic intubation  - transfuse 1U pRBC- f/u posttsf CBC  - maintain active T&S  - large bore IVs    ID  #sepsis   s/p 7 days of broad spectrum abx (merrem/zosyn)  5/5 new Tmax rectal T 101.4, WBC 28.54  last Tmax 101.3 5/7 22:00  - uptrending leukocytosis  - UA no leuk esterase, nitrites- no UTI, GI PCR negative, C diff panel negative   - sputum cx no growth, bcx NTD  - f/u repeat sputum cx 5/8  - ID consulted, following- c/w zosyn 3.375    FEN: Jevity @40, Replete lytes PRN K>4, Mg>2  PPx: SCDs  Code: DNR/DNI, temporarily rescinded for procedure, pending further GOC discussion  Dispo: MICU

## 2023-05-09 NOTE — PROGRESS NOTE ADULT - SUBJECTIVE AND OBJECTIVE BOX
CC: Patient is a 83y old  Female who presents with a chief complaint of Anemia concern for hemolysis, SIRS     ***Acceptance Note from M-ICU to F***  Hospital Course:  83 year old female Cantonese speaking, history obtained from daughter, PMH HTN, dementia presents with 1 week of painless jaundice and fatigue. She originally presented to her out patient GI, Dr. Aguero, and he referred her to our ED due to jaundice. In the ED, patient found to have Hb 5.6, Na 120, T bili 11.8 (direct), Alk Phos 669, , . 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 Admitted to tele for severe hyponatremia and symptomatic anemia, pt received 2 u PRBCs. Now Na self-corrected to 127 with fluid restriction only, Hb improved to 8.8. GI consulted, plan for EUS-ERCP for CBD stent placement which was unsuccessful with plans to undergo choledocoduodenostomy with advanced endoscopy today - for which she received 1U pRCB's overnight on 05/01 for surgical optimization. From ID standpoint, patient febrile on Friday 4/28, was on Vanc and Meropenem. ID consulted for rising WBC on Meropenem, though no more fevers, Ab were discontinued. On evening of 5/3, pt went for choledocoduodenostomy with advanced endoscopy. During procedure, DNR/DNI order temporarily rescinded, pt found to have profuse gastric contents and was unable to be extubated, transferred to MICU post procedure. Pt weaned off sedation, passing CPAP trial but difficult to arouse, not following commands. ET tube with kailee bloody contents- given tranexenamic acid. Pt spiked fevers 101.1 started on zosyn. More awake and arousable, 5/8 pt successfully extubated to HFNC 60/60 and is now stable for stepdown to RMF on 5/9.    INTERVAL EVENTS: ADELAIDA  SUBJECTIVE / INTERVAL HPI: Patient seen and examined at bedside. Pt non-verbal at this time.   ROS: negative unless otherwise stated above.    VITAL SIGNS:  Vital Signs Last 24 Hrs  T(C): 38 (09 May 2023 10:00), Max: 38.4 (09 May 2023 00:00)  T(F): 100.4 (09 May 2023 10:00), Max: 101.1 (09 May 2023 00:00)  HR: 110 (09 May 2023 13:07) (99 - 114)  BP: 113/66 (09 May 2023 13:00) (94/54 - 118/64)  BP(mean): 84 (09 May 2023 13:00) (68 - 89)  RR: 38 (09 May 2023 13:07) (25 - 50)  SpO2: 96% (09 May 2023 13:07) (90% - 99%)    Parameters below as of 09 May 2023 13:07  Patient On (Oxygen Delivery Method): nasal cannula, high flow  O2 Flow (L/min): 60  O2 Concentration (%): 60      05-08-23 @ 07:01  -  05-09-23 @ 07:00  --------------------------------------------------------  IN: 1279.8 mL / OUT: 860 mL / NET: 419.8 mL    05-09-23 @ 07:01  -  05-09-23 @ 13:20  --------------------------------------------------------  IN: 175 mL / OUT: 155 mL / NET: 20 mL        PHYSICAL EXAM:  General: Alert and oriented x 1-2. No acute distress. + Jaundiced on scalp, face, and throughout body.   Eyes: Eyes closed during examination  HEENT: Dry mucous membranes. No scleral icterus. No cervical lymphadenopathy.  Lungs: Clear to auscultation bilaterally. No accessory muscle use.  Cardiovascular: Regular rate and rhythm. No murmur. No JVD.  Abdomen: Soft, non-distended, mild grimacing with palpation. No palpable masses.  Extremities: No edema. Non-tender.  Skin: No rashes or lesions. Warm.  Neurologic: No focal neurological deficits. CN II-XII grossly intact, but not individually tested.  Psychiatric: Not speaking, eyes closed on examination.     MEDICATIONS:  MEDICATIONS  (STANDING):  chlorhexidine 2% Cloths 1 Application(s) Topical <User Schedule>  pantoprazole  Injectable 40 milliGRAM(s) IV Push every 12 hours  piperacillin/tazobactam IVPB.. 3.375 Gram(s) IV Intermittent every 8 hours    MEDICATIONS  (PRN):  acetaminophen   IVPB .. 675 milliGRAM(s) IV Intermittent every 8 hours PRN Temp greater or equal to 38C (100.4F), Mild Pain (1 - 3)  ondansetron Injectable 4 milliGRAM(s) IV Push every 8 hours PRN Nausea and/or Vomiting      ALLERGIES:  Allergies    No Known Allergies    Intolerances        LABS:                        7.4    35.10 )-----------( 138      ( 09 May 2023 05:30 )             23.7     05-09    145  |  112<H>  |  23  ----------------------------<  149<H>  4.2   |  25  |  0.82    Ca    8.7      09 May 2023 05:30  Phos  3.0     05-09  Mg     1.9     05-09    TPro  5.1<L>  /  Alb  1.9<L>  /  TBili  8.3<H>  /  DBili  x   /  AST  123<H>  /  ALT  58<H>  /  AlkPhos  315<H>  05-09        CAPILLARY BLOOD GLUCOSE      POCT Blood Glucose.: 146 mg/dL (08 May 2023 19:57)      RADIOLOGY & ADDITIONAL TESTS: Reviewed.

## 2023-05-09 NOTE — PROGRESS NOTE ADULT - ATTENDING SUPERVISION STATEMENT
Resident
Fellow
Resident
Fellow
Fellow
Resident
Fellow
Fellow
Resident

## 2023-05-09 NOTE — PROGRESS NOTE ADULT - ASSESSMENT
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 s/p attempted biliary stent placement without success, pending choledochoduodenostomy today.

## 2023-05-09 NOTE — PROGRESS NOTE ADULT - ASSESSMENT
This is a 83F with dementia and HTN presents with worsening jaundice and anemia found to have obstructive jaundice from large uncinate mass. ERCP failed 4/27 with bx, now s/p endoscopic choledochoduodenostomy 5/3 and GJ with course complicated by aspiration and intubation. No extubated with increasing leukocytosis.     - no acute surgical intervention  - F/U GOC discussion  - Appreciate excellent care per MICU for aspiration pneumonia  Surgery Team 1C will continue to follow. Please page Team 1 with questions/clinical changes. [phone]     This is a 83F with dementia and HTN presents with worsening jaundice and anemia found to have obstructive jaundice from large uncinate mass. ERCP failed 4/27 with bx, now s/p endoscopic choledochoduodenostomy 5/3 and GJ with course complicated by aspiration and intubation. No extubated with increasing leukocytosis.     - no acute surgical intervention  - F/U GOC discussion  - Appreciate excellent care per MICU for aspiration pneumonia  - Surgery Team 1C will sign off, please reconsult as necessary

## 2023-05-09 NOTE — PROGRESS NOTE ADULT - SUBJECTIVE AND OBJECTIVE BOX
SUBJECTIVE: Patient seen and evaluated. S/p extubation        MEDICATIONS  (STANDING):  chlorhexidine 2% Cloths 1 Application(s) Topical <User Schedule>  pantoprazole  Injectable 40 milliGRAM(s) IV Push every 12 hours  piperacillin/tazobactam IVPB.. 3.375 Gram(s) IV Intermittent every 8 hours    MEDICATIONS  (PRN):  acetaminophen   Oral Liquid .. 650 milliGRAM(s) Oral every 6 hours PRN Temp greater or equal to 38C (100.4F), Mild Pain (1 - 3)  ondansetron Injectable 4 milliGRAM(s) IV Push every 8 hours PRN Nausea and/or Vomiting      Vital Signs Last 24 Hrs  T(C): 37.8 (09 May 2023 06:00), Max: 38.4 (09 May 2023 00:00)  T(F): 100 (09 May 2023 06:00), Max: 101.1 (09 May 2023 00:00)  HR: 100 (09 May 2023 06:00) (100 - 114)  BP: 95/53 (09 May 2023 06:00) (94/54 - 111/64)  BP(mean): 68 (09 May 2023 06:00) (68 - 82)  RR: 32 (09 May 2023 06:00) (25 - 50)  SpO2: 96% (09 May 2023 06:00) (91% - 99%)    Parameters below as of 09 May 2023 06:00  Patient On (Oxygen Delivery Method): nasal cannula, high flow  O2 Flow (L/min): 60  O2 Concentration (%): 60    Physical Exam:  General: NAD, resting comfortably in bed  Pulmonary: Nonlabored breathing, no respiratory distress  Cardiovascular: NSR  Abdominal: soft, NT/ND  Extremities: WWP, normal strength  Neuro: A/O x 3, CNs II-XII grossly intact, no focal deficits, normal motor/sensation  Pulses: palpable distal pulses    I&O's Summary    07 May 2023 07:01  -  08 May 2023 07:00  --------------------------------------------------------  IN: 1965 mL / OUT: 382 mL / NET: 1583 mL    08 May 2023 07:01  -  09 May 2023 06:19  --------------------------------------------------------  IN: 1254.8 mL / OUT: 715 mL / NET: 539.8 mL        LABS:                        7.4    35.10 )-----------( 138      ( 09 May 2023 05:30 )             23.7     05-09    145  |  112<H>  |  23  ----------------------------<  149<H>  4.2   |  25  |  0.82    Ca    8.7      09 May 2023 05:30  Phos  3.0     05-09  Mg     1.9     05-09    TPro  5.1<L>  /  Alb  1.9<L>  /  TBili  8.3<H>  /  DBili  x   /  AST  123<H>  /  ALT  58<H>  /  AlkPhos  315<H>  05-09        CAPILLARY BLOOD GLUCOSE      POCT Blood Glucose.: 146 mg/dL (08 May 2023 19:57)    LIVER FUNCTIONS - ( 09 May 2023 05:30 )  Alb: 1.9 g/dL / Pro: 5.1 g/dL / ALK PHOS: 315 U/L / ALT: 58 U/L / AST: 123 U/L / GGT: x             RADIOLOGY & ADDITIONAL STUDIES:

## 2023-05-09 NOTE — PROGRESS NOTE ADULT - PROBLEM SELECTOR PLAN 3
Normocytic anemia w hgb 5.7 (down from 9.7 1 month ago on ferrous sulfate PO 325mg daily). S/p 2 units PRBC transfusion with improvement in hemoglobin. LDH and hapto wnl. Borderline iron deficiency noted on lab work. Most recently, received 1 unit pRBC's overnight 05/01 in preparation for advanced endoscopy. Hgb on 5/3 of 10.3 with Hct of 32.7   Plan:   - Maintain active T&S.   - Transfuse <7

## 2023-05-09 NOTE — PROGRESS NOTE ADULT - ATTENDING COMMENTS
83 y/oF  Cantonese speaking PMH Dementia,  HTN, LEW ( baseline Hgb 9.7), presented with 1 week of jaundice, fatigue and decreased po intake. Pt was seen by PMD Dr. Kary Ly on 4/24/23, blood test showed Tbili 10.8, , , , WBC 22.8K, Hgb 6, and Serum Sodium 125.Pt had normal LFT one month ago ( TBili<0.2, ALP 56, AST 18 and ALT 11).  Pt was sent to [facility]/Dr. Yonas Evans whom referred pt to the hospital for further management. 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 s/p biopsied.- Path ( duodenal and pancreatic mass): poorly differentiated carcinoma with sarcomatoid features     encounter time 9:15 am  she is on HFNC, open eyes ( not for long) appear very weak,   poor respiratory effort.   rectal tube in place for loose stool ( c-diff negative)   daughter at bedside, hypotensive on monitor.       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  # Failure to thrive   # Dementia   # Leukocytosis /possible leukemoid reaction.  # Severe Anemia - sp 3 units of pbrc.  # N/V   # Abd pain   # Duodenal ulcer/ Gwendolyn Ruano tear  # Coagulopathy     -care by micu appreciated.   - she had choledochoduoxenostomy 5/3- GO, palliative EUS GJ. tolerated procedure well, post procedure remain intubated and transferred to micu.  ( GOO explained the patient vomitting in 5/2) , encephalopathy -difficult to access mental status to access for extubation 5/5-  started NGT for nutrition., anemia hb drop 6.8- given  blood transfusion 5/6 -bloody secretion on ET suction, micu team offered bronchoscopy for evaluation-daughter declined, extubated to HFNC 5/8-   - intermittently open eyes since 5/6- not following commands.  - palliative GI procedure to relieve- malignant obstruction - t bili is 12 ---> 10 --> 8.8 on 5/6--> 8.7 on 5/7--> 7.9 on 5/8  - c/w PPI iv bid   - Tumor markers: CA 19-9=306 and CEA level =6.3  - Hyponatremia resolved.     - Anemia- sp 4 units PRBC since admission- 4th unit of prbc on 5/6- hb 9.8 on 5/7--> 8.6 on 5/8    - persistent Leukocytosis:  possibly leukemoid/related to malignancy, sp vancomcyin (4/28-5/1), meropenem 4/28-5/2 ), restarted on Zosyn 5/4- for fever  - f/u BCx :   - fu respiratory culture, thus far negative.   wbc 29 on 5/6 , 25.9 on 5/7-   - ID following appreciated. -plan discussed.   -  viral hepatitis serology negative     - advanced pancreatic vs duodenal CA- both biopsy with poorly differentiated adeno with sarcoid feature-  Heme Onc f/u appreciated, not a candidate for chemotherapy given frailed condition  ECOG is now at 4 ( she need full assist in daily activities)    - Surgery evaluation appreciated, note reviewed. not candidate for surgery.     - Palliative care consult appreciated for GOC discussion ( initiated),Pt is DNR/DNI, reverse for procedure ( palliative GI procedure )-    - code status: DNR/DNI ( pt has living will) and HCP previously completed.   1st HCP: Alessandro Mares ( )  Alternate HCP: Elly Mares ( daughter)   - VTE ppx; SCDs    Contact:  Elly Mares ( daughter) [phone]   PMD: Dr. Kary Ozuna [phone]  GI: Dr. Yonas Evans/[facility] [phone]    Disposition: medically active- care by micu/ palliative appreciated, dw micu team.- transferred to floor.   I spoke with daughter Elly - to bring her sister directly [place] Hospital, pt is critically ill, given her poor mental status- she will not be able to tolerate diet ( risk of aspiration , we talked about pleasure feeding if they would like to pursue comfort approach )   - per my conversation with patient  on saturday 5/6/23 )  ,  dose not want her to suffer, he is aware her time is limited due to advanced CA and he prefer that she be comfortable.   palliative team evaluation appreciated - ? candidate for in-patient hospice   DNR/ DNI 83 y/oF  Cantonese speaking PMH Dementia,  HTN, LEW ( baseline Hgb 9.7), presented with 1 week of jaundice, fatigue and decreased po intake. Pt was seen by PMD Dr. Kary Ly on 4/24/23, blood test showed Tbili 10.8, , , , WBC 22.8K, Hgb 6, and Serum Sodium 125.Pt had normal LFT one month ago ( TBili<0.2, ALP 56, AST 18 and ALT 11).  Pt was sent to [facility]/Dr. Yonas Evans whom referred pt to the hospital for further management. 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 s/p biopsied.- Path ( duodenal and pancreatic mass): poorly differentiated carcinoma with sarcomatoid features     encounter time 9:15 am  she is on HFNC, open eyes ( not for long) appear very weak,   poor respiratory effort.   rectal tube in place for loose stool ( c-diff negative)   daughter at bedside, hypotensive on monitor.       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  # Failure to thrive   # Dementia   # Leukocytosis /possible leukemoid reaction.  # Severe Anemia - sp 3 units of pbrc.  # N/V   # Abd pain   # Duodenal ulcer/ Gwendolyn Ruano tear  # Coagulopathy     -care by micu appreciated.   - she had choledochoduoxenostomy 5/3- GO, palliative EUS GJ. tolerated procedure well, post procedure remain intubated and transferred to micu.  ( GOO explained the patient vomitting in 5/2) , encephalopathy -difficult to access mental status to access for extubation 5/5-  started NGT for nutrition., anemia hb drop 6.8- given  blood transfusion 5/6 -bloody secretion on ET suction, micu team offered bronchoscopy for evaluation-daughter declined, extubated to HFNC 5/8-   - intermittently open eyes since 5/6- not following commands.  - palliative GI procedure to relieve- malignant obstruction - t bili is 12 ---> 10 --> 8.8 on 5/6--> 8.7 on 5/7--> 7.9 on 5/8  - c/w PPI iv bid   - Tumor markers: CA 19-9=306 and CEA level =6.3  - Hyponatremia resolved.     - Anemia- sp 4 units PRBC since admission- 4th unit of prbc on 5/6- hb 9.8 on 5/7--> 8.6 on 5/8    - persistent Leukocytosis:  possibly leukemoid/related to malignancy, sp vancomcyin (4/28-5/1), meropenem 4/28-5/2 ), restarted on Zosyn 5/4- for fever  - f/u BCx :   - fu respiratory culture, thus far negative.   wbc 29 on 5/6 , 25.9 on 5/7-   - ID following appreciated. -plan discussed.   -  viral hepatitis serology negative     - advanced pancreatic vs duodenal CA- both biopsy with poorly differentiated adeno with sarcoid feature-  Heme Onc f/u appreciated, not a candidate for chemotherapy given frailed condition  ECOG is now at 4 ( she need full assist in daily activities)    - Surgery evaluation appreciated, note reviewed. not candidate for surgery.     - Palliative care consult appreciated for GOC discussion ( initiated),Pt is DNR/DNI, reverse for procedure ( palliative GI procedure )-    - code status: DNR/DNI ( pt has living will) and HCP previously completed.   1st HCP: Alessandro Mares ( )  Alternate HCP: Elly Mares ( daughter)   - VTE ppx; SCDs    Contact:  Elly Mares ( daughter) [phone]   PMD: Dr. Kary Ozuna [phone]  GI: Dr. Yonas Evans/[facility] [phone]    Disposition: medically active- care by micu/ palliative appreciated, shruti micu team.- transferred to floor- dw medical team, ID,   I spoke with daughter Elly - to bring her sister directly [place] Hospital, pt is critically ill, given her poor mental status- she will not be able to tolerate diet ( risk of aspiration , we talked about pleasure feeding if they would like to pursue comfort approach )   - per my conversation with patient  on saturday 5/6/23 )  ,  dose not want her to suffer, he is aware her time is limited due to advanced CA and he prefer that she be comfortable.   palliative team evaluation appreciated - ? candidate for in-patient hospice   DNR/ DNI

## 2023-05-09 NOTE — PROGRESS NOTE ADULT - ATTENDING COMMENTS
#Fever, metastatic pancreatic cancer    Continues to be febrile.  patient now extubated, on HFNC, appears comfortable, denied SOB and abdominal pain.  Has liquidy stool - c.diff neg. Fever likelydue to advanced pancreatic cancer.  Patient didn't respond to empiric broad spectrum abx so unlikely abx will help her.  Cont zosyn 3.375g IV q8h over 4h for now while awaiting for faimly members to come.   Per Dr. David, patient's 2nd daughter is coming from [facility] today and will transition to inpatient hospice after GOC discussion.  If patient transitioned to comfort care, then suggest stopping abx.      Team 1 will follow you.   Case d/w primary team.    Leida Donato MD, MS  Infectious Disease attending  work cell [phone]   For any questions during evening/weekend/holiday, please page ID on call.

## 2023-05-09 NOTE — PROGRESS NOTE ADULT - NUTRITIONAL ASSESSMENT
This patient has been assessed with a concern for Malnutrition and has been determined to have a diagnosis/diagnoses of Severe protein-calorie malnutrition.    This patient is being managed with:   Diet Clear Liquid-  Entered: Apr 27 2023  1:00PM  
This patient has been assessed with a concern for Malnutrition and has been determined to have a diagnosis/diagnoses of Severe protein-calorie malnutrition.    This patient is being managed with:   Diet NPO after Midnight-     NPO Start Date: 01-May-2023   NPO Start Time: 23:59  Entered: May  1 2023  8:52AM    Diet NPO-  Except Medications  With Ice Chips/Sips of Water  Entered: Apr 28 2023 11:18AM  
This patient has been assessed with a concern for Malnutrition and has been determined to have a diagnosis/diagnoses of Severe protein-calorie malnutrition.    This patient is being managed with:   Diet NPO with Tube Feed-  Tube Feeding Modality: Nasogastric  Jevity 1.2 Sandoval (JEVITY1.2RTH)  Continuous  Starting Tube Feed Rate {mL per Hour}: 20  Increase Tube Feed Rate by (mL): 5     Every hour  Until Goal Tube Feed Rate (mL per Hour): 40  Tube Feed Duration (in Hours): 24  Tube Feed Start Time: 19:00  Free Water Flush   Total Volume per Flush (mL): 250   Frequency: Every 6 Hours  Entered: May  7 2023 11:06AM  
This patient has been assessed with a concern for Malnutrition and has been determined to have a diagnosis/diagnoses of Severe protein-calorie malnutrition.    This patient is being managed with:   Diet NPO-  Entered: May  3 2023 10:50PM    Diet NPO after Midnight-     NPO Start Date: 02-May-2023   NPO Start Time: 23:59  Entered: May  2 2023  2:59PM  
This patient has been assessed with a concern for Malnutrition and has been determined to have a diagnosis/diagnoses of Severe protein-calorie malnutrition.    This patient is being managed with:   Diet NPO-  Except Medications  With Ice Chips/Sips of Water  Entered: Apr 28 2023 11:18AM  
This patient has been assessed with a concern for Malnutrition and has been determined to have a diagnosis/diagnoses of Severe protein-calorie malnutrition.    This patient is being managed with:   Diet NPO after Midnight-     NPO Start Date: 01-May-2023   NPO Start Time: 23:59  Entered: May  1 2023  8:52AM    Diet NPO-  Except Medications  With Ice Chips/Sips of Water  Entered: Apr 28 2023 11:18AM  
This patient has been assessed with a concern for Malnutrition and has been determined to have a diagnosis/diagnoses of Severe protein-calorie malnutrition.    This patient is being managed with:   Diet NPO after Midnight-     NPO Start Date: 02-May-2023   NPO Start Time: 23:59  Entered: May  2 2023  2:59PM  
This patient has been assessed with a concern for Malnutrition and has been determined to have a diagnosis/diagnoses of Severe protein-calorie malnutrition.    This patient is being managed with:   Diet NPO with Tube Feed-  Tube Feeding Modality: Nasogastric  Jevity 1.2 Sandoval (JEVITY1.2RTH)  Continuous  Starting Tube Feed Rate {mL per Hour}: 20  Increase Tube Feed Rate by (mL): 5     Every hour  Until Goal Tube Feed Rate (mL per Hour): 40  Tube Feed Duration (in Hours): 24  Tube Feed Start Time: 19:00  Free Water Flush   Total Volume per Flush (mL): 250   Frequency: Every 6 Hours  Entered: May  7 2023 11:06AM  
This patient has been assessed with a concern for Malnutrition and has been determined to have a diagnosis/diagnoses of Severe protein-calorie malnutrition.    This patient is being managed with:   Diet NPO-  Entered: May  8 2023  4:36PM  
This patient has been assessed with a concern for Malnutrition and has been determined to have a diagnosis/diagnoses of Severe protein-calorie malnutrition.    This patient is being managed with:   Diet NPO-  Entered: May  8 2023  4:36PM  
This patient has been assessed with a concern for Malnutrition and has been determined to have a diagnosis/diagnoses of Severe protein-calorie malnutrition.    This patient is being managed with:   Diet Clear Liquid-  1500mL Fluid Restriction ([id number])  Entered: Apr 27 2023 12:20PM  
This patient has been assessed with a concern for Malnutrition and has been determined to have a diagnosis/diagnoses of Severe protein-calorie malnutrition.    This patient is being managed with:   Diet NPO after Midnight-     NPO Start Date: 01-May-2023   NPO Start Time: 23:59  Entered: May  1 2023  8:52AM    Diet NPO-  Except Medications  With Ice Chips/Sips of Water  Entered: Apr 28 2023 11:18AM  
This patient has been assessed with a concern for Malnutrition and has been determined to have a diagnosis/diagnoses of Severe protein-calorie malnutrition.    This patient is being managed with:   Diet NPO after Midnight-     NPO Start Date: 01-May-2023   NPO Start Time: 23:59  Entered: May  1 2023  8:52AM    Diet NPO-  Except Medications  With Ice Chips/Sips of Water  Entered: Apr 28 2023 11:18AM  
This patient has been assessed with a concern for Malnutrition and has been determined to have a diagnosis/diagnoses of Severe protein-calorie malnutrition.    This patient is being managed with:   Diet NPO with Tube Feed-  Tube Feeding Modality: Nasogastric  Jevity 1.2 Sandoval (JEVITY1.2RTH)  Continuous  Starting Tube Feed Rate {mL per Hour}: 20  Increase Tube Feed Rate by (mL): 5     Every hour  Until Goal Tube Feed Rate (mL per Hour): 40  Tube Feed Duration (in Hours): 24  Tube Feed Start Time: 19:00  Entered: May  5 2023  6:40PM    Diet NPO after Midnight-     NPO Start Date: 02-May-2023   NPO Start Time: 23:59  Entered: May  2 2023  2:59PM  
This patient has been assessed with a concern for Malnutrition and has been determined to have a diagnosis/diagnoses of Severe protein-calorie malnutrition.    This patient is being managed with:   Diet NPO with Tube Feed-  Tube Feeding Modality: Nasogastric  Jevity 1.2 Sandoval (JEVITY1.2RTH)  Continuous  Starting Tube Feed Rate {mL per Hour}: 20  Increase Tube Feed Rate by (mL): 5     Every hour  Until Goal Tube Feed Rate (mL per Hour): 40  Tube Feed Duration (in Hours): 24  Tube Feed Start Time: 19:00  Free Water Flush   Total Volume per Flush (mL): 250   Frequency: Every 6 Hours  Entered: May  7 2023 11:06AM  
This patient has been assessed with a concern for Malnutrition and has been determined to have a diagnosis/diagnoses of Severe protein-calorie malnutrition.    This patient is being managed with:   Diet NPO with Tube Feed-  Tube Feeding Modality: Nasogastric  Jevity 1.2 Sandoval (JEVITY1.2RTH)  Continuous  Starting Tube Feed Rate {mL per Hour}: 20  Increase Tube Feed Rate by (mL): 5     Every hour  Until Goal Tube Feed Rate (mL per Hour): 40  Tube Feed Duration (in Hours): 24  Tube Feed Start Time: 19:00  Free Water Flush   Total Volume per Flush (mL): 250   Frequency: Every 6 Hours  Entered: May  7 2023 11:06AM  
This patient has been assessed with a concern for Malnutrition and has been determined to have a diagnosis/diagnoses of Severe protein-calorie malnutrition.    This patient is being managed with:   Diet NPO-  Entered: May  3 2023 10:50PM    Diet NPO after Midnight-     NPO Start Date: 02-May-2023   NPO Start Time: 23:59  Entered: May  2 2023  2:59PM  
This patient has been assessed with a concern for Malnutrition and has been determined to have a diagnosis/diagnoses of Severe protein-calorie malnutrition.    This patient is being managed with:   Diet NPO-  Except Medications  With Ice Chips/Sips of Water  Entered: Apr 28 2023 11:18AM  
This patient has been assessed with a concern for Malnutrition and has been determined to have a diagnosis/diagnoses of Severe protein-calorie malnutrition.    This patient is being managed with:   Diet Clear Liquid-  Entered: Apr 27 2023  1:00PM  
This patient has been assessed with a concern for Malnutrition and has been determined to have a diagnosis/diagnoses of Severe protein-calorie malnutrition.    This patient is being managed with:   Diet NPO after Midnight-     NPO Start Date: 01-May-2023   NPO Start Time: 23:59  Entered: May  1 2023  8:52AM    Diet NPO-  Except Medications  With Ice Chips/Sips of Water  Entered: Apr 28 2023 11:18AM  
This patient has been assessed with a concern for Malnutrition and has been determined to have a diagnosis/diagnoses of Severe protein-calorie malnutrition.    This patient is being managed with:   Diet NPO after Midnight-     NPO Start Date: 02-May-2023   NPO Start Time: 23:59  Entered: May  2 2023  2:59PM  
This patient has been assessed with a concern for Malnutrition and has been determined to have a diagnosis/diagnoses of Severe protein-calorie malnutrition.    This patient is being managed with:   Diet NPO-  Entered: May  8 2023  4:36PM  
This patient has been assessed with a concern for Malnutrition and has been determined to have a diagnosis/diagnoses of Severe protein-calorie malnutrition.    This patient is being managed with:   Diet NPO-  Except Medications  With Ice Chips/Sips of Water  Entered: Apr 28 2023 11:18AM  
This patient has been assessed with a concern for Malnutrition and has been determined to have a diagnosis/diagnoses of Severe protein-calorie malnutrition.    This patient is being managed with:   Diet NPO-  Except Medications  With Ice Chips/Sips of Water  Entered: Apr 28 2023 11:18AM  
This patient has been assessed with a concern for Malnutrition and has been determined to have a diagnosis/diagnoses of Severe protein-calorie malnutrition.    This patient is being managed with:   Diet Clear Liquid-  Entered: Apr 27 2023  1:00PM  
This patient has been assessed with a concern for Malnutrition and has been determined to have a diagnosis/diagnoses of Severe protein-calorie malnutrition.    This patient is being managed with:   Diet NPO after Midnight-     NPO Start Date: 01-May-2023   NPO Start Time: 23:59  Entered: May  1 2023  8:52AM    Diet NPO-  Except Medications  With Ice Chips/Sips of Water  Entered: Apr 28 2023 11:18AM  
This patient has been assessed with a concern for Malnutrition and has been determined to have a diagnosis/diagnoses of Severe protein-calorie malnutrition.    This patient is being managed with:   Diet NPO with Tube Feed-  Tube Feeding Modality: Nasogastric  Jevity 1.2 Sandoval (JEVITY1.2RTH)  Continuous  Starting Tube Feed Rate {mL per Hour}: 20  Increase Tube Feed Rate by (mL): 5     Every hour  Until Goal Tube Feed Rate (mL per Hour): 40  Tube Feed Duration (in Hours): 24  Tube Feed Start Time: 19:00  Free Water Flush   Total Volume per Flush (mL): 250   Frequency: Every 6 Hours  Entered: May  7 2023 11:06AM  
This patient has been assessed with a concern for Malnutrition and has been determined to have a diagnosis/diagnoses of Severe protein-calorie malnutrition.    This patient is being managed with:   Diet NPO with Tube Feed-  Tube Feeding Modality: Nasogastric  Jevity 1.2 Snadoval (JEVITY1.2RTH)  Continuous  Starting Tube Feed Rate {mL per Hour}: 20  Increase Tube Feed Rate by (mL): 5     Every hour  Until Goal Tube Feed Rate (mL per Hour): 40  Tube Feed Duration (in Hours): 24  Tube Feed Start Time: 19:00  Entered: May  5 2023  6:40PM    Diet NPO after Midnight-     NPO Start Date: 02-May-2023   NPO Start Time: 23:59  Entered: May  2 2023  2:59PM  
This patient has been assessed with a concern for Malnutrition and has been determined to have a diagnosis/diagnoses of Severe protein-calorie malnutrition.    This patient is being managed with:   Diet NPO-  Entered: May  3 2023 10:50PM    Diet NPO after Midnight-     NPO Start Date: 02-May-2023   NPO Start Time: 23:59  Entered: May  2 2023  2:59PM  
This patient has been assessed with a concern for Malnutrition and has been determined to have a diagnosis/diagnoses of Severe protein-calorie malnutrition.    This patient is being managed with:   Diet NPO-  Except Medications  With Ice Chips/Sips of Water  Entered: Apr 28 2023 11:18AM  
This patient has been assessed with a concern for Malnutrition and has been determined to have a diagnosis/diagnoses of Severe protein-calorie malnutrition.    This patient is being managed with:   Diet NPO after Midnight-     NPO Start Date: 02-May-2023   NPO Start Time: 23:59  Entered: May  2 2023  2:59PM  
This patient has been assessed with a concern for Malnutrition and has been determined to have a diagnosis/diagnoses of Severe protein-calorie malnutrition.    This patient is being managed with:   Diet NPO-  Entered: May  8 2023  4:36PM

## 2023-05-09 NOTE — PROGRESS NOTE ADULT - ATTENDING COMMENTS
Pancreatic cancer, AHRF after choledochduodenostomy/gastric stent  physical as above  tolerating extubation  DNR/DNI

## 2023-05-09 NOTE — PROGRESS NOTE ADULT - PROBLEM SELECTOR PLAN 8
Elevated transaminases w cholestatic pattern and hyper bilirubinemia mostly direct bili, low albumin, INR 1.94. Negative gaming's sign, abd soft non tender, and she is afebrile higher concern for malignancy. Results are lateral with cholestatic picture 2/2 pancreatic mass. Do not expect many changes until definitive intervention. Bili up to 14.3 - no pruritus.   Plan:   - Total bilirubin 12.1 on 5/3 with AST/ALT of 147/95 respectively   - Continue to monitor, follow GI recs

## 2023-05-09 NOTE — PROGRESS NOTE ADULT - SUBJECTIVE AND OBJECTIVE BOX
Patient is a 83y old  Female who presents with a chief complaint of Anemia concern for hemolysis, SIRS (09 May 2023 08:29)      ***STEPDOWN FROM MICU TO 7WO***  Hospital Course:  83 year old female Cantonese speaking, history obtained from daughter, PMH HTN, dementia presents with 1 week of painless jaundice and fatigue. She originally presented to her out patient GI, Dr. Aguero, and he referred her to our ED due to jaundice. In the ED, patient found to have Hb 5.6, Na 120, T bili 11.8 (direct), Alk Phos 669, , . 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 Admitted to [facility] for severe hyponatremia and symptomatic anemia, pt received 2 u PRBCs. Now Na self-corrected to 127 with fluid restriction only, Hb improved to 8.8. GI consulted, plan for EUS-ERCP for CBD stent placement which was unsuccessful with plans to undergo choledocoduodenostomy with advanced endoscopy today - for which she received 1U pRCB's overnight on 05/01 for surgical optimization. From ID standpoint, patient febrile on Friday 4/28, was on Vanc and Meropenem. ID consulted for rising WBC on Meropenem, though no more fevers, Ab were discontinued. On evening of 5/3, pt went for choledocoduodenostomy with advanced endoscopy. During procedure, DNR/DNI order temporarily rescinded, pt found to have profuse gastric contents and was unable to be extubated, transferred to MICU post procedure. Pt weaned off sedation, passing CPAP trial but difficult to arouse, not following commands. ET tube with kailee bloody contents- given tranexenamic acid. Pt spiked fevers 101.1 started on zosyn. More awake and arousable, 5/8 pt successfully extubated to HFNC 60/60. Pending family discussion upon arrival of Stable for transfer to [facility].    INTERVAL HPI/OVERNIGHT EVENTS:   No overnight events   Afebrile, hemodynamically stable     Subjective: Patient seen and examined at bedside awake and arousable, not following commands. Per daughter at bedside she nods but has not spoken.     ICU Vital Signs Last 24 Hrs  T(C): 38 (09 May 2023 10:00), Max: 38.4 (09 May 2023 00:00)  T(F): 100.4 (09 May 2023 10:00), Max: 101.1 (09 May 2023 00:00)  HR: 109 (09 May 2023 12:00) (99 - 114)  BP: 113/74 (09 May 2023 12:00) (94/54 - 118/64)  BP(mean): 89 (09 May 2023 12:00) (68 - 89)  ABP: --  ABP(mean): --  RR: 36 (09 May 2023 12:00) (25 - 50)  SpO2: 95% (09 May 2023 12:00) (90% - 99%)    O2 Parameters below as of 09 May 2023 12:00  Patient On (Oxygen Delivery Method): nasal cannula, high flow  O2 Flow (L/min): 60  O2 Concentration (%): 60      I&O's Summary    08 May 2023 07:01  -  09 May 2023 07:00  --------------------------------------------------------  IN: 1279.8 mL / OUT: 860 mL / NET: 419.8 mL    09 May 2023 07:01  -  09 May 2023 12:52  --------------------------------------------------------  IN: 175 mL / OUT: 155 mL / NET: 20 mL      Mode: standby      PHYSICAL EXAM:  GENERAL: NAD, euvolemic  EYES: PERRLA, scleral icterus  ENMT: Moist mucous membranes  NECK: Supple, No JVD  HEART: Regular rate and rhythm; No murmurs, rubs, or gallops  RESPIRATORY: rales at b/l bases  ABDOMEN: Soft, Nontender, Nondistended; Bowel sounds present  : rectal tube in place, Heath draining dark green urine  NEUROLOGY: Awake and alert, does not respond to orientation questions  EXTREMITIES:  2+ Peripheral Pulses, No clubbing, cyanosis, or edema  SKIN: jaundiced skin    LABS:                        7.4    35.10 )-----------( 138      ( 09 May 2023 05:30 )             23.7     05-09    145  |  112<H>  |  23  ----------------------------<  149<H>  4.2   |  25  |  0.82    Ca    8.7      09 May 2023 05:30  Phos  3.0     05-09  Mg     1.9     05-09    TPro  5.1<L>  /  Alb  1.9<L>  /  TBili  8.3<H>  /  DBili  x   /  AST  123<H>  /  ALT  58<H>  /  AlkPhos  315<H>  05-09        CAPILLARY BLOOD GLUCOSE      POCT Blood Glucose.: 146 mg/dL (08 May 2023 19:57)        Consultant(s) Notes Reviewed:  [x ] YES  [ ] NO    MEDICATIONS  (STANDING):  chlorhexidine 2% Cloths 1 Application(s) Topical <User Schedule>  pantoprazole  Injectable 40 milliGRAM(s) IV Push every 12 hours  piperacillin/tazobactam IVPB.. 3.375 Gram(s) IV Intermittent every 8 hours    MEDICATIONS  (PRN):  acetaminophen   IVPB .. 675 milliGRAM(s) IV Intermittent every 8 hours PRN Temp greater or equal to 38C (100.4F), Mild Pain (1 - 3)  ondansetron Injectable 4 milliGRAM(s) IV Push every 8 hours PRN Nausea and/or Vomiting      Care Discussed with Consultants/Other Providers [ x] YES  [ ] NO    RADIOLOGY & ADDITIONAL TESTS: ICU Progress Note     ***STEPDOWN FROM MICU TO 7WO***  Hospital Course:  83 year old female Cantonese speaking, history obtained from daughter, PMH HTN, dementia presents with 1 week of painless jaundice and fatigue. She originally presented to her out patient GI, Dr. Aguero, and he referred her to our ED due to jaundice. In the ED, patient found to have Hb 5.6, Na 120, T bili 11.8 (direct), Alk Phos 669, , . 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 Admitted to [facility] for severe hyponatremia and symptomatic anemia, pt received 2 u PRBCs. Now Na self-corrected to 127 with fluid restriction only, Hb improved to 8.8. GI consulted, plan for EUS-ERCP for CBD stent placement which was unsuccessful with plans to undergo choledocoduodenostomy with advanced endoscopy today - for which she received 1U pRCB's overnight on 05/01 for surgical optimization. From ID standpoint, patient febrile on Friday 4/28, was on Vanc and Meropenem. ID consulted for rising WBC on Meropenem, though no more fevers, Ab were discontinued. On evening of 5/3, pt went for choledocoduodenostomy with advanced endoscopy. During procedure, DNR/DNI order temporarily rescinded, pt found to have profuse gastric contents and was unable to be extubated, transferred to MICU post procedure. Pt weaned off sedation, passing CPAP trial but difficult to arouse, not following commands. ET tube with kailee bloody contents- given tranexenamic acid. Pt spiked fevers 101.1 started on zosyn. More awake and arousable, 5/8 pt successfully extubated to HFNC 60/60. Pending family discussion upon arrival of Stable for transfer to [facility].    INTERVAL HPI/OVERNIGHT EVENTS:   No overnight events   Afebrile, hemodynamically stable     Subjective: Patient seen and examined at bedside awake and arousable, not following commands. Per daughter at bedside she nods but has not spoken.     ICU Vital Signs Last 24 Hrs  T(C): 38 (09 May 2023 10:00), Max: 38.4 (09 May 2023 00:00)  T(F): 100.4 (09 May 2023 10:00), Max: 101.1 (09 May 2023 00:00)  HR: 109 (09 May 2023 12:00) (99 - 114)  BP: 113/74 (09 May 2023 12:00) (94/54 - 118/64)  BP(mean): 89 (09 May 2023 12:00) (68 - 89)  ABP: --  ABP(mean): --  RR: 36 (09 May 2023 12:00) (25 - 50)  SpO2: 95% (09 May 2023 12:00) (90% - 99%)    O2 Parameters below as of 09 May 2023 12:00  Patient On (Oxygen Delivery Method): nasal cannula, high flow  O2 Flow (L/min): 60  O2 Concentration (%): 60      I&O's Summary    08 May 2023 07:01  -  09 May 2023 07:00  --------------------------------------------------------  IN: 1279.8 mL / OUT: 860 mL / NET: 419.8 mL    09 May 2023 07:01  -  09 May 2023 12:52  --------------------------------------------------------  IN: 175 mL / OUT: 155 mL / NET: 20 mL      Mode: standby      PHYSICAL EXAM:  GENERAL: NAD, euvolemic  EYES: PERRLA, scleral icterus  ENMT: Moist mucous membranes  NECK: Supple, No JVD  HEART: Regular rate and rhythm; No murmurs, rubs, or gallops  RESPIRATORY: rales at b/l bases  ABDOMEN: Soft, Nontender, Nondistended; Bowel sounds present  : rectal tube in place, Heath draining dark green urine  NEUROLOGY: Awake and alert, does not respond to orientation questions  EXTREMITIES:  2+ Peripheral Pulses, No clubbing, cyanosis, or edema  SKIN: jaundiced skin    LABS:                        7.4    35.10 )-----------( 138      ( 09 May 2023 05:30 )             23.7     05-09    145  |  112<H>  |  23  ----------------------------<  149<H>  4.2   |  25  |  0.82    Ca    8.7      09 May 2023 05:30  Phos  3.0     05-09  Mg     1.9     05-09    TPro  5.1<L>  /  Alb  1.9<L>  /  TBili  8.3<H>  /  DBili  x   /  AST  123<H>  /  ALT  58<H>  /  AlkPhos  315<H>  05-09        CAPILLARY BLOOD GLUCOSE      POCT Blood Glucose.: 146 mg/dL (08 May 2023 19:57)        Consultant(s) Notes Reviewed:  [x ] YES  [ ] NO    MEDICATIONS  (STANDING):  chlorhexidine 2% Cloths 1 Application(s) Topical <User Schedule>  pantoprazole  Injectable 40 milliGRAM(s) IV Push every 12 hours  piperacillin/tazobactam IVPB.. 3.375 Gram(s) IV Intermittent every 8 hours    MEDICATIONS  (PRN):  acetaminophen   IVPB .. 675 milliGRAM(s) IV Intermittent every 8 hours PRN Temp greater or equal to 38C (100.4F), Mild Pain (1 - 3)  ondansetron Injectable 4 milliGRAM(s) IV Push every 8 hours PRN Nausea and/or Vomiting      Care Discussed with Consultants/Other Providers [ x] YES  [ ] NO    RADIOLOGY & ADDITIONAL TESTS:

## 2023-05-09 NOTE — PROGRESS NOTE ADULT - PROVIDER SPECIALTY LIST ADULT
Gastroenterology
Hospitalist
Hospitalist
Infectious Disease
Internal Medicine
MICU
Nephrology
Surgery
Gastroenterology
Gastroenterology
Heme/Onc
Hospitalist
Hospitalist
Infectious Disease
Infectious Disease
Internal Medicine
MICU
Nephrology
Nephrology
Surgery
Gastroenterology
Heme/Onc
Hospitalist
Infectious Disease
Infectious Disease
Internal Medicine
Internal Medicine
MICU
Nephrology
Surgery
Surgery
Hospitalist
Hospitalist
Internal Medicine
Nephrology
Surgery
Surgery
Hospitalist
Internal Medicine
Surgery
Nephrology
Internal Medicine

## 2023-05-09 NOTE — PROGRESS NOTE ADULT - ASSESSMENT
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 s/p attempted biliary stent placement without success, s/p choledochoduodenostomy, unable to be extubated -- stepped up to MICU, successfully extubated 5/8, now pending family GOC discussion    Neurology  s/p extubation 5/9  AA not speaking unable to assess orientation,     Pulmonary  #AHRF  patient transfer to MICU failed extubation trail in endoscopy after procedure (DNR DNI temporarily rescinded for procedure)  pt with hx of pulmonary metastases  POCUS showing diffuse b lines with small consolidation at R base  CXR after procedure unchanged to previous, airspace opacities likely 2/2 to pulmonary mets   - s/p extubation 5/9   - possibly secondary to ventilator associated PNA- CXR showing increased infiltrates,   - f/u repeat sputum cx collected 5/9  - procal mildly elevated, MRSA, strep PNA negative   - c/w zosyn 3.375 q8 (5/4-5/11)  - ID consulted, following   - HFNC 60/60 wean as tolerated     #pulmonary nodules  Suspicious for pulmonary metastasis.    Cardiology  NAEI    GI  #Adenocarcinoma of pancreas   Pt presenting w painless jaundice, found to have large pancreatic mass suspicious for adenocarcinoma. GI and Surgery consultation placed with plans for ERCP with CBD stenting/biopsy for diagnosis. CTAP 4/25: cystic/necrotic lesion in uncinate process of pancreas 6x10cm, CBD dilation 2.4cm, pulmonary nodules suspicious for metastasis.   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   On EGD performed 4/27, pt was found to have a small, linear Gwendolyn-Ruano tear in the distal esophagus with retained gastric contents. Pt had 1x episode of hematemesis w clots mixed in sputum and old blood in evening of 5/2, CXR unchanged, H&H stable.   - Continue to monitor   - F/u with GI recs as above  - c/w Pantoprazole 40mg IVP BID    #diarrhea   9 bouts of diarrhea 5/7  -GI PCR, Cdiff negative  - dcd bowel regimen  - c/w monitor     Renal/  #ATN   UA showed brown granular casts. Pt with poor PO intake for last 4 days  on tube feeds  - Cr stable  - strict I&O  - monitor Uop    Endocrine  NIMESH    HEME  #normocytic-macrocytic anemia   Hb 6.8 today likely 2/2 to traumatic intubation  - transfuse 1U pRBC- f/u posttsf CBC  - maintain active T&S  - large bore IVs    ID  #sepsis   s/p 7 days of broad spectrum abx (merrem/zosyn)  5/5 new Tmax rectal T 101.4, WBC 28.54  last Tmax 101.3 5/7 22:00  - uptrending leukocytosis  - UA no leuk esterase, nitrites- no UTI, GI PCR negative, C diff panel negative   - sputum cx no growth, bcx NTD  - f/u repeat sputum cx 5/8  - ID consulted, following- c/w zosyn 3.375    FEN: Jevity @40, Replete lytes PRN K>4, Mg>2  PPx: SCDs  Code: DNR/DNI, temporarily rescinded for procedure, pending further GOC discussion  Dispo: MICU     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 s/p attempted biliary stent placement without success, s/p choledochoduodenostomy, unable to be extubated -- stepped up to MICU, successfully extubated 5/8, now pending family GOC discussion    Neurology  s/p extubation 5/9  AA not speaking unable to assess orientation,     Pulmonary  #AHRF  patient transfer to MICU failed extubation trial in endoscopy after procedure (DNR DNI temporarily rescinded for procedure)  pt with hx of pulmonary metastases  POCUS showing diffuse b lines with small consolidation at R base  CXR after procedure unchanged to previous, airspace opacities likely 2/2 to pulmonary mets   - s/p extubation 5/9   - possibly secondary to ventilator associated PNA- CXR showing increased infiltrates,   - f/u repeat sputum cx collected 5/9  - procal mildly elevated, MRSA, strep PNA negative   - c/w zosyn 3.375 q8 (5/4-5/11)  - ID consulted, following   - HFNC 60/60 wean as tolerated     #hemoptysis  pt with kailee bloody hemoptysis after intubation   daughter refused bronchoscopy   s/p tranexamenic acid x2  - pt still with bloody hemoptysis after extubation   - monitor CBC, active T&S, transfuse <7    #pulmonary nodules  Suspicious for pulmonary metastasis.    Cardiology  NAEI    GI  #Adenocarcinoma of pancreas   Pt presenting w painless jaundice, found to have large pancreatic mass suspicious for adenocarcinoma. CTAP 4/25: cystic/necrotic lesion in uncinate process of pancreas 6x10cm, CBD dilation 2.4cm, pulmonary nodules suspicious for metastasis. 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   On EGD performed 4/27, pt was found to have a small, linear Gwendolyn-Ruano tear in the distal esophagus with retained gastric contents. Pt had 1x episode of hematemesis w clots mixed in sputum and old blood in evening of 5/2, CXR unchanged, H&H stable.   - Continue to monitor   - F/u with GI recs as above  - c/w Pantoprazole 40mg IVP BID    #diarrhea   9 bouts of diarrhea 5/7  - GI PCR, Cdiff negative  - rectal tube in place    Renal/  #ATN   UA showed brown granular casts. Pt with poor PO intake for last 4 days  Previously on tube feeds   - Cr stable  - strict I&O  - monitor Uop    Endocrine  NIMESH    HEME  #normocytic-macrocytic anemia   arriving in ED Hb 5.6  - s/p 4U pRBC  - maintain active T&S  - large bore IVs    ID  #sepsis   s/p 7 days of broad spectrum abx (merrem/zosyn)  5/5 new Tmax rectal T 101.4, WBC 28.54  last Tmax 101.3 5/7 22:00  - uptrending leukocytosis  - UA no leuk esterase, nitrites- no UTI, GI PCR negative, C diff panel negative   - sputum cx no growth, bcx NTD  - f/u repeat sputum cx 5/8  - ID consulted, following- c/w zosyn 3.375    FEN: Jevity @40, Replete lytes PRN K>4, Mg>2  PPx: SCDs  Code: DNR/DNI, temporarily rescinded for procedure, pending further GOC discussion  Dispo: MICU

## 2023-05-09 NOTE — PROGRESS NOTE ADULT - PROBLEM SELECTOR PLAN 6
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 On EGD performed 4/27, pt was found to have a small, linear Gwendolyn-Ruano tear visualized in the distal esophagus with some liquefied retained gastric contents that were suctioned out. Pt had 1x episode of hematemesis w clots mixed in sputum and old blood in evening of 5/2, CXR unchanged, H&H stable. Additionally, pt with kailee bloody hemoptysis after intubation   daughter refused bronchoscopy. S/p tranexamenic acid x2. Pt still with bloody hemoptysis after extubation  Plan:  - Continue to monitor   - F/u with GI recs as above  - Pantoprazole gtt at 10mL/hr started o/n on 5/2. Pt switched to Pantoprazole 40mg IVP q12h on 5/3

## 2023-05-09 NOTE — PROGRESS NOTE ADULT - PROBLEM SELECTOR PLAN 1
#Adenocarcinoma of pancreas  Pt presenting w painless jaundice, found to have large pancreatic mass suspicious for adenocarcinoma. CTAP 4/25: cystic/necrotic lesion in uncinate process of pancreas 6x10cm, CBD dilation 2.4cm, pulmonary nodules suspicious for metastasis. GI, surgery consulted. 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   On EGD performed 4/27, pt was found to have a small, linear Gwendolyn-Ruano tear in the distal esophagus with retained gastric contents. Pt had 1x episode of hematemesis w clots mixed in sputum and old blood in evening of 5/2, CXR unchanged, H&H stable.   - Continue to monitor   - F/u with GI recs as above  - c/w Pantoprazole 40mg IVP BID

## 2023-05-09 NOTE — PROGRESS NOTE ADULT - PROBLEM SELECTOR PLAN 4
patient transfer to MICU failed extubation trial in endoscopy after procedure (DNR DNI temporarily rescinded for procedure) on 5/3. Pt with hx of pulmonary metastases. POCUS showing diffuse b lines with small consolidation at R base. CXR after procedure unchanged to previous, airspace opacities likely 2/2 to pulmonary mets. Pt stabilized, extubated on 5/9, and stepped down to [facility]. Procal mildly elevated, MRSA, strep PNA negative.   Plan:   - possibly secondary to ventilator associated PNA- CXR showing increased infiltrates,   - f/u repeat sputum cx collected 5/9  - c/w zosyn 3.375 q8 (5/4-5/11)  - ID consulted, following   - HFNC 60/60 wean as tolerated

## 2023-05-09 NOTE — PROGRESS NOTE ADULT - PROBLEM SELECTOR PLAN 10
F: Fluid restriction.   E: K > 4, Mg > 2.   N: NPO, pending choledochoduodenostomy and endoscopic GJ 5/3   GI: Pantoprazole gtt   DVT px: SCDs due to low hgb.   Dispo:  [facility]
F: Fluid restriction.   E: K > 4, Mg > 2.   N: NPO, pending mental status improvement.   DVT px: SCDs due to low hgb.   Dispo: [address] [place] [address]

## 2023-05-09 NOTE — PROGRESS NOTE ADULT - PROBLEM SELECTOR PLAN 5
Likely metastatic lesions, on 3L O2. Wean as tolerated. Pt had hemoptysis in the MICU after intubation. Daughter Marisela refused bronchoscopy. Pt s/p tranexamenic acid x2  Plan:   - pt still with bloody hemoptysis after extubation   - monitor CBC, active T&S, transfuse <7

## 2023-05-09 NOTE — PROGRESS NOTE ADULT - SUBJECTIVE AND OBJECTIVE BOX
INFECTIOUS DISEASES CONSULT FOLLOW-UP NOTE    INTERVAL HPI/OVERNIGHT EVENTS:  Intubated, sedated.     ROS:   Constitutional, eyes, ENT, cardiovascular, respiratory, gastrointestinal, genitourinary, integumentary, neurological, psychiatric and heme/lymph are otherwise negative other than noted above       ANTIBIOTICS/RELEVANT:    MEDICATIONS  (STANDING):  chlorhexidine 2% Cloths 1 Application(s) Topical <User Schedule>  pantoprazole  Injectable 40 milliGRAM(s) IV Push every 12 hours  piperacillin/tazobactam IVPB.. 3.375 Gram(s) IV Intermittent every 8 hours    MEDICATIONS  (PRN):  acetaminophen   Oral Liquid .. 650 milliGRAM(s) Oral every 6 hours PRN Temp greater or equal to 38C (100.4F), Mild Pain (1 - 3)  ondansetron Injectable 4 milliGRAM(s) IV Push every 8 hours PRN Nausea and/or Vomiting        Vital Signs Last 24 Hrs  T(C): 37.8 (09 May 2023 06:00), Max: 38.4 (09 May 2023 00:00)  T(F): 100 (09 May 2023 06:00), Max: 101.1 (09 May 2023 00:00)  HR: 106 (09 May 2023 09:06) (99 - 114)  BP: 97/53 (09 May 2023 08:00) (94/54 - 111/64)  BP(mean): 69 (09 May 2023 08:00) (68 - 82)  RR: 39 (09 May 2023 09:06) (25 - 50)  SpO2: 92% (09 May 2023 09:06) (92% - 99%)    Parameters below as of 09 May 2023 09:06  Patient On (Oxygen Delivery Method): nasal cannula, high flow  O2 Flow (L/min): 60  O2 Concentration (%): 60    05-08-23 @ 07:01  -  05-09-23 @ 07:00  --------------------------------------------------------  IN: 1279.8 mL / OUT: 860 mL / NET: 419.8 mL    05-09-23 @ 07:01 - 05-09-23 @ 09:07  --------------------------------------------------------  IN: 125 mL / OUT: 20 mL / NET: 105 mL      PHYSICAL EXAM:  Constitutional: intubated, sedated  Eyes: the sclera and conjunctiva were normal.   ENT: the ears and nose were normal in appearance.   Neck: the appearance of the neck was normal and the neck was supple.   Pulmonary: no respiratory distress and lungs were clear to auscultation bilaterally.   Heart: heart rate was normal and rhythm regular, normal S1 and S2  Vascular:. there was no peripheral edema  Abdomen: normal bowel sounds, soft, non-tender  Neurological: no focal deficits.   Psychiatric: the affect was normal        LABS:                        7.4    35.10 )-----------( 138      ( 09 May 2023 05:30 )             23.7     05-09    145  |  112<H>  |  23  ----------------------------<  149<H>  4.2   |  25  |  0.82    Ca    8.7      09 May 2023 05:30  Phos  3.0     05-09  Mg     1.9     05-09    TPro  5.1<L>  /  Alb  1.9<L>  /  TBili  8.3<H>  /  DBili  x   /  AST  123<H>  /  ALT  58<H>  /  AlkPhos  315<H>  05-09          MICROBIOLOGY:      RADIOLOGY & ADDITIONAL STUDIES:  Reviewed INFECTIOUS DISEASES CONSULT FOLLOW-UP NOTE    INTERVAL HPI/OVERNIGHT EVENTS:      ROS:   Constitutional, eyes, ENT, cardiovascular, respiratory, gastrointestinal, genitourinary, integumentary, neurological, psychiatric and heme/lymph are otherwise negative other than noted above       ANTIBIOTICS/RELEVANT:    MEDICATIONS  (STANDING):  chlorhexidine 2% Cloths 1 Application(s) Topical <User Schedule>  pantoprazole  Injectable 40 milliGRAM(s) IV Push every 12 hours  piperacillin/tazobactam IVPB.. 3.375 Gram(s) IV Intermittent every 8 hours    MEDICATIONS  (PRN):  acetaminophen   Oral Liquid .. 650 milliGRAM(s) Oral every 6 hours PRN Temp greater or equal to 38C (100.4F), Mild Pain (1 - 3)  ondansetron Injectable 4 milliGRAM(s) IV Push every 8 hours PRN Nausea and/or Vomiting        Vital Signs Last 24 Hrs  T(C): 37.8 (09 May 2023 06:00), Max: 38.4 (09 May 2023 00:00)  T(F): 100 (09 May 2023 06:00), Max: 101.1 (09 May 2023 00:00)  HR: 106 (09 May 2023 09:06) (99 - 114)  BP: 97/53 (09 May 2023 08:00) (94/54 - 111/64)  BP(mean): 69 (09 May 2023 08:00) (68 - 82)  RR: 39 (09 May 2023 09:06) (25 - 50)  SpO2: 92% (09 May 2023 09:06) (92% - 99%)    Parameters below as of 09 May 2023 09:06  Patient On (Oxygen Delivery Method): nasal cannula, high flow  O2 Flow (L/min): 60  O2 Concentration (%): 60    05-08-23 @ 07:01  -  05-09-23 @ 07:00  --------------------------------------------------------  IN: 1279.8 mL / OUT: 860 mL / NET: 419.8 mL    05-09-23 @ 07:01  -  05-09-23 @ 09:07  --------------------------------------------------------  IN: 125 mL / OUT: 20 mL / NET: 105 mL      PHYSICAL EXAM:  Constitutional: intubated, sedated  Eyes: the sclera and conjunctiva were normal.   ENT: the ears and nose were normal in appearance.   Neck: the appearance of the neck was normal and the neck was supple.   Pulmonary: no respiratory distress and lungs were clear to auscultation bilaterally.   Heart: heart rate was normal and rhythm regular, normal S1 and S2  Vascular:. there was no peripheral edema  Abdomen: normal bowel sounds, soft, non-tender  Neurological: no focal deficits.   Psychiatric: the affect was normal        LABS:                        7.4    35.10 )-----------( 138      ( 09 May 2023 05:30 )             23.7     05-09    145  |  112<H>  |  23  ----------------------------<  149<H>  4.2   |  25  |  0.82    Ca    8.7      09 May 2023 05:30  Phos  3.0     05-09  Mg     1.9     05-09    TPro  5.1<L>  /  Alb  1.9<L>  /  TBili  8.3<H>  /  DBili  x   /  AST  123<H>  /  ALT  58<H>  /  AlkPhos  315<H>  05-09          MICROBIOLOGY:      RADIOLOGY & ADDITIONAL STUDIES:  Reviewed INFECTIOUS DISEASES CONSULT FOLLOW-UP NOTE    INTERVAL HPI/OVERNIGHT EVENTS:  Extubated yesterday    ROS:   Constitutional, eyes, ENT, cardiovascular, respiratory, gastrointestinal, genitourinary, integumentary, neurological, psychiatric and heme/lymph are otherwise negative other than noted above       ANTIBIOTICS/RELEVANT:    MEDICATIONS  (STANDING):  chlorhexidine 2% Cloths 1 Application(s) Topical <User Schedule>  pantoprazole  Injectable 40 milliGRAM(s) IV Push every 12 hours  piperacillin/tazobactam IVPB.. 3.375 Gram(s) IV Intermittent every 8 hours    MEDICATIONS  (PRN):  acetaminophen   Oral Liquid .. 650 milliGRAM(s) Oral every 6 hours PRN Temp greater or equal to 38C (100.4F), Mild Pain (1 - 3)  ondansetron Injectable 4 milliGRAM(s) IV Push every 8 hours PRN Nausea and/or Vomiting        Vital Signs Last 24 Hrs  T(C): 37.8 (09 May 2023 06:00), Max: 38.4 (09 May 2023 00:00)  T(F): 100 (09 May 2023 06:00), Max: 101.1 (09 May 2023 00:00)  HR: 106 (09 May 2023 09:06) (99 - 114)  BP: 97/53 (09 May 2023 08:00) (94/54 - 111/64)  BP(mean): 69 (09 May 2023 08:00) (68 - 82)  RR: 39 (09 May 2023 09:06) (25 - 50)  SpO2: 92% (09 May 2023 09:06) (92% - 99%)    Parameters below as of 09 May 2023 09:06  Patient On (Oxygen Delivery Method): nasal cannula, high flow  O2 Flow (L/min): 60  O2 Concentration (%): 60    05-08-23 @ 07:01  -  05-09-23 @ 07:00  --------------------------------------------------------  IN: 1279.8 mL / OUT: 860 mL / NET: 419.8 mL    05-09-23 @ 07:01  -  05-09-23 @ 09:07  --------------------------------------------------------  IN: 125 mL / OUT: 20 mL / NET: 105 mL      PHYSICAL EXAM:  Constitutional: NAD  Eyes: the sclera and conjunctiva were normal.   ENT: the ears and nose were normal in appearance.   Neck: the appearance of the neck was normal and the neck was supple.   Pulmonary: no respiratory distress and lungs were clear to auscultation bilaterally.   Heart: heart rate was normal and rhythm regular, normal S1 and S2  Vascular:. there was no peripheral edema  Abdomen: normal bowel sounds, soft, non-tender  Neurological: no focal deficits.   Psychiatric: the affect was normal        LABS:                        7.4    35.10 )-----------( 138      ( 09 May 2023 05:30 )             23.7     05-09    145  |  112<H>  |  23  ----------------------------<  149<H>  4.2   |  25  |  0.82    Ca    8.7      09 May 2023 05:30  Phos  3.0     05-09  Mg     1.9     05-09    TPro  5.1<L>  /  Alb  1.9<L>  /  TBili  8.3<H>  /  DBili  x   /  AST  123<H>  /  ALT  58<H>  /  AlkPhos  315<H>  05-09          MICROBIOLOGY:      RADIOLOGY & ADDITIONAL STUDIES:  Reviewed

## 2023-05-09 NOTE — PROGRESS NOTE ADULT - ASSESSMENT
82 y/o Cantonese speaking, history obtained from daughter, F PMH HTN, dementia presents with 1 week of jaundice found to have pancreatic mass s/p EUS and biopsy pending choleocuduodesntosoym. Course c/b leukocytosis to 22k and Tmax 100.9 on 4/28, started on zosyn then switched to meropenem on 4/29. ID consulted for leukocytosis and IV abx duration       Fever 100.8 overnight   Ucx 4/25 contaminated  Blood cx 4/28 NGTD x2  CXR with progressive lung infiltrates bilaterally  C dif negative      Recommendations:   - Pt extubated with no re-intubation   - c/w zosyn 3.375 q8hr , will determine duration based off GOC today 5/9  - 5/4 blood cx NGTD  - 5/8 blood cx NGTD      Team 1 will continue to follow. Thank you for the consult.   Recommendations not final until attending attestation.

## 2023-05-10 NOTE — DISCHARGE NOTE FOR THE EXPIRED PATIENT - HOSPITAL COURSE
83 year old female Cantonese speaking, history obtained from daughter, PMH HTN, dementia presents with 1 week of painless jaundice and fatigue. She originally presented to her out patient GI, Dr. Aguero, and he referred her to our ED due to jaundice. In the ED, patient found to have Hb 5.6, Na 120, T bili 11.8 (direct), Alk Phos 669, , . 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 Admitted to tele for severe hyponatremia and symptomatic anemia, pt received 2 u PRBCs. Now Na self-corrected to 127 with fluid restriction only, Hb improved to 8.8. GI consulted, plan for EUS-ERCP for CBD stent placement which was unsuccessful with plans to undergo choledocoduodenostomy with advanced endoscopy today - for which she received 1U pRCB's overnight on 05/01 for surgical optimization. From ID standpoint, patient febrile on Friday 4/28, was on Vanc and Meropenem. ID consulted for rising WBC on Meropenem, though no more fevers, Ab were discontinued. On evening of 5/3, pt went for choledocoduodenostomy with advanced endoscopy. During procedure, DNR/DNI order temporarily rescinded, pt found to have profuse gastric contents and was unable to be extubated, transferred to MICU post procedure. Pt weaned off sedation, passing CPAP trial but difficult to arouse, not following commands. ET tube with kailee bloody contents- given tranexenamic acid. Pt spiked fevers 101.1 started on zosyn. More awake and arousable, 5/8 pt successfully extubated to HFNC 60/60. Patient stepped down to floors. Found to have no audible breath or heart sounds for greater than 1 minute. No palpable pulse for greater than 1 minute. No palpable cardiac pacemaker. Pupils are fixed, dilated and unreactive to light. There is no response to painful stimulus at 307AM.

## 2023-05-12 LAB — FUNGITELL: <31 PG/ML — SIGNIFICANT CHANGE UP

## 2023-05-14 LAB
CULTURE RESULTS: SIGNIFICANT CHANGE UP
SPECIMEN SOURCE: SIGNIFICANT CHANGE UP

## 2023-05-18 DIAGNOSIS — J69.0 PNEUMONITIS DUE TO INHALATION OF FOOD AND VOMIT: ICD-10-CM

## 2023-05-18 DIAGNOSIS — I10 ESSENTIAL (PRIMARY) HYPERTENSION: ICD-10-CM

## 2023-05-18 DIAGNOSIS — E43 UNSPECIFIED SEVERE PROTEIN-CALORIE MALNUTRITION: ICD-10-CM

## 2023-05-18 DIAGNOSIS — G93.40 ENCEPHALOPATHY, UNSPECIFIED: ICD-10-CM

## 2023-05-18 DIAGNOSIS — E88.09 OTHER DISORDERS OF PLASMA-PROTEIN METABOLISM, NOT ELSEWHERE CLASSIFIED: ICD-10-CM

## 2023-05-18 DIAGNOSIS — D68.9 COAGULATION DEFECT, UNSPECIFIED: ICD-10-CM

## 2023-05-18 DIAGNOSIS — Z79.899 OTHER LONG TERM (CURRENT) DRUG THERAPY: ICD-10-CM

## 2023-05-18 DIAGNOSIS — C17.0 MALIGNANT NEOPLASM OF DUODENUM: ICD-10-CM

## 2023-05-18 DIAGNOSIS — I96 GANGRENE, NOT ELSEWHERE CLASSIFIED: ICD-10-CM

## 2023-05-18 DIAGNOSIS — C78.01 SECONDARY MALIGNANT NEOPLASM OF RIGHT LUNG: ICD-10-CM

## 2023-05-18 DIAGNOSIS — R62.7 ADULT FAILURE TO THRIVE: ICD-10-CM

## 2023-05-18 DIAGNOSIS — D64.9 ANEMIA, UNSPECIFIED: ICD-10-CM

## 2023-05-18 DIAGNOSIS — F03.90 UNSPECIFIED DEMENTIA WITHOUT BEHAVIORAL DISTURBANCE: ICD-10-CM

## 2023-05-18 DIAGNOSIS — K22.6 GASTRO-ESOPHAGEAL LACERATION-HEMORRHAGE SYNDROME: ICD-10-CM

## 2023-05-18 DIAGNOSIS — Z86.16 PERSONAL HISTORY OF COVID-19: ICD-10-CM

## 2023-05-18 DIAGNOSIS — K31.1 ADULT HYPERTROPHIC PYLORIC STENOSIS: ICD-10-CM

## 2023-05-18 DIAGNOSIS — J96.01 ACUTE RESPIRATORY FAILURE WITH HYPOXIA: ICD-10-CM

## 2023-05-18 DIAGNOSIS — A41.9 SEPSIS, UNSPECIFIED ORGANISM: ICD-10-CM

## 2023-05-18 DIAGNOSIS — K83.1 OBSTRUCTION OF BILE DUCT: ICD-10-CM

## 2023-05-18 DIAGNOSIS — R04.2 HEMOPTYSIS: ICD-10-CM

## 2023-05-18 DIAGNOSIS — Z66 DO NOT RESUSCITATE: ICD-10-CM

## 2023-05-18 DIAGNOSIS — D63.0 ANEMIA IN NEOPLASTIC DISEASE: ICD-10-CM

## 2023-05-18 DIAGNOSIS — Z78.1 PHYSICAL RESTRAINT STATUS: ICD-10-CM

## 2023-05-18 DIAGNOSIS — C25.9 MALIGNANT NEOPLASM OF PANCREAS, UNSPECIFIED: ICD-10-CM

## 2023-05-18 DIAGNOSIS — E87.1 HYPO-OSMOLALITY AND HYPONATREMIA: ICD-10-CM

## 2023-05-18 DIAGNOSIS — C78.02 SECONDARY MALIGNANT NEOPLASM OF LEFT LUNG: ICD-10-CM

## 2023-09-27 NOTE — PROGRESS NOTE ADULT - REASON FOR ADMISSION
Anemia concern for hemolysis, SIRS
Post-Op Assessment Note    CV Status:  Stable    Pain management: adequate     Mental Status:  Sleepy   Hydration Status:  Euvolemic and stable   PONV Controlled:  Controlled   Airway Patency:  Patent   Two or more mitigation strategies used for obstructive sleep apnea   Post Op Vitals Reviewed: Yes      Staff: Anesthesiologist, CRNA         No notable events documented.   BP   104/62   Temp 97   Pulse 72   Resp 16   SpO2 95
Anemia concern for hemolysis, SIRS

## 2025-02-11 NOTE — ED PROVIDER NOTE - NS ED ATTENDING STATEMENT MOD
Attending Only ADMIT I have personally provided the amount of critical care time documented below excluding time spent on separate procedures.
